# Patient Record
Sex: MALE | Race: WHITE | NOT HISPANIC OR LATINO | Employment: FULL TIME | URBAN - METROPOLITAN AREA
[De-identification: names, ages, dates, MRNs, and addresses within clinical notes are randomized per-mention and may not be internally consistent; named-entity substitution may affect disease eponyms.]

---

## 2023-06-26 DIAGNOSIS — K21.9 GASTROESOPHAGEAL REFLUX DISEASE WITHOUT ESOPHAGITIS: ICD-10-CM

## 2023-06-26 DIAGNOSIS — E03.9 HYPOTHYROIDISM, UNSPECIFIED TYPE: Primary | ICD-10-CM

## 2023-06-26 RX ORDER — TIMOLOL MALEATE 5 MG/ML
1 SOLUTION/ DROPS OPHTHALMIC
COMMUNITY
Start: 2023-05-23 | End: 2023-10-17 | Stop reason: SDUPTHER

## 2023-06-26 RX ORDER — ALPRAZOLAM 0.5 MG/1
1 TABLET ORAL DAILY
COMMUNITY
End: 2024-01-16 | Stop reason: SDUPTHER

## 2023-06-26 RX ORDER — QUETIAPINE FUMARATE 25 MG/1
25 TABLET, FILM COATED ORAL
COMMUNITY
Start: 2023-05-30 | End: 2024-01-16 | Stop reason: SDUPTHER

## 2023-06-26 RX ORDER — OMEPRAZOLE 20 MG/1
20 CAPSULE, DELAYED RELEASE ORAL DAILY
COMMUNITY
Start: 2023-05-23 | End: 2023-06-26 | Stop reason: SDUPTHER

## 2023-06-26 RX ORDER — LATANOPROST 50 UG/ML
1 SOLUTION/ DROPS OPHTHALMIC NIGHTLY
COMMUNITY
Start: 2023-05-31 | End: 2023-10-17 | Stop reason: SDUPTHER

## 2023-06-26 RX ORDER — OMEPRAZOLE 20 MG/1
20 CAPSULE, DELAYED RELEASE ORAL DAILY
Qty: 30 CAPSULE | Refills: 0 | Status: SHIPPED | OUTPATIENT
Start: 2023-06-26 | End: 2023-08-10 | Stop reason: SDUPTHER

## 2023-06-26 RX ORDER — LEVOTHYROXINE SODIUM 175 UG/1
TABLET ORAL
Qty: 30 TABLET | Refills: 0 | Status: SHIPPED | OUTPATIENT
Start: 2023-06-26 | End: 2023-10-02

## 2023-06-26 RX ORDER — LEVOTHYROXINE SODIUM 175 UG/1
TABLET ORAL
COMMUNITY
Start: 2023-06-08 | End: 2023-06-26 | Stop reason: SDUPTHER

## 2023-06-26 NOTE — TELEPHONE ENCOUNTER
Rx Refill Request Telephone Encounter    Name:  Sp Morrison  :  [id number]  Medication Name: Omeprazole 20 mg, Synthroid 175mg  Specific Pharmacy location:    [facility] [id number] [facility] [address] [facility]  [address] [facility] [address]  Phone: [phone] Fax: [phone]  Date of last appointment:  3/3/2023  Date of next appointment:  N/A  Best number to reach patient:  [phone]

## 2023-08-09 DIAGNOSIS — K21.9 GASTROESOPHAGEAL REFLUX DISEASE WITHOUT ESOPHAGITIS: ICD-10-CM

## 2023-08-10 ENCOUNTER — OFFICE VISIT (OUTPATIENT)
Dept: PRIMARY CARE | Facility: CLINIC | Age: 58
End: 2023-08-10
Payer: COMMERCIAL

## 2023-08-10 VITALS
TEMPERATURE: 98.3 F | BODY MASS INDEX: 33.88 KG/M2 | OXYGEN SATURATION: 96 % | WEIGHT: 242 LBS | RESPIRATION RATE: 18 BRPM | HEART RATE: 90 BPM | DIASTOLIC BLOOD PRESSURE: 92 MMHG | SYSTOLIC BLOOD PRESSURE: 148 MMHG | HEIGHT: 71 IN

## 2023-08-10 DIAGNOSIS — K21.9 GASTROESOPHAGEAL REFLUX DISEASE WITHOUT ESOPHAGITIS: ICD-10-CM

## 2023-08-10 DIAGNOSIS — E03.9 HYPOTHYROIDISM, UNSPECIFIED TYPE: ICD-10-CM

## 2023-08-10 DIAGNOSIS — Z00.00 PHYSICAL EXAM: Primary | ICD-10-CM

## 2023-08-10 PROCEDURE — 99396 PREV VISIT EST AGE 40-64: CPT | Performed by: NURSE PRACTITIONER

## 2023-08-10 RX ORDER — OMEPRAZOLE 20 MG/1
20 CAPSULE, DELAYED RELEASE ORAL DAILY
Qty: 90 CAPSULE | Refills: 3 | Status: SHIPPED | OUTPATIENT
Start: 2023-08-10 | End: 2024-05-23 | Stop reason: SDUPTHER

## 2023-08-10 ASSESSMENT — ENCOUNTER SYMPTOMS
WHEEZING: 0
PALPITATIONS: 0
COUGH: 0

## 2023-08-10 ASSESSMENT — PATIENT HEALTH QUESTIONNAIRE - PHQ9
SUM OF ALL RESPONSES TO PHQ9 QUESTIONS 1 AND 2: 0
2. FEELING DOWN, DEPRESSED OR HOPELESS: NOT AT ALL
1. LITTLE INTEREST OR PLEASURE IN DOING THINGS: NOT AT ALL

## 2023-08-10 ASSESSMENT — PAIN SCALES - GENERAL: PAINLEVEL: 0-NO PAIN

## 2023-08-10 NOTE — PATIENT INSTRUCTIONS
Continue medications as prescribed.  Healthy diet and drink plenty of water.  Please have labs drawn-You will be notified with abnormal results only.    SEE MY CHART FOR RESULTS- If you have any questions please do not hesitate to notify our office.    Please schedule all necessary health screenings ophthalmology and dentist.    Follow-up in 6 MONTHS.  Call office any new concerns.

## 2023-08-10 NOTE — TELEPHONE ENCOUNTER
Rx Refill Request Telephone Encounter    Name:  Sp Morrison  :  [id number]  Medication Name:  omeprazole 20 mg DR capsule  Specific Pharmacy location:    [facility] [id number] [facility] [address] [facility]  [address] [facility] [address]  Phone: [phone] Fax: [phone]  Date of last appointment:  3/3/2023  Date of next appointment:  8/10/2023  Best number to reach patient:  [phone]

## 2023-08-10 NOTE — PROGRESS NOTES
"Subjective   Patient ID: Sp Morrison is a 58 y.o. male who presents for Med Refill (Blood work for thyroid. ).    Med Refill  Pertinent negatives include no chest pain or coughing.      Patient presents to clinic for annual visit.    Past medical history of anxiety being followed by psychiatry.  Recently seenx 1 month ago.    Has been doing well no specific complaints or concerns today.    Appetite normal bowel and bladder normal.    Health screenings:  Colonoscopy 10/7/2021    Current everyday smoker      Review of Systems   Respiratory:  Negative for cough and wheezing.    Cardiovascular:  Negative for chest pain and palpitations.   All other systems reviewed and are negative.      Objective   BP (!) 148/92 (BP Location: Left arm, Patient Position: Sitting, BP Cuff Size: Large adult)   Pulse 90   Temp 36.8 °C (98.3 °F) (Temporal)   Resp 18   Ht 1.803 m (5' 11\")   Wt 110 kg (242 lb)   SpO2 96%   BMI 33.75 kg/m²     Physical Exam  Vitals reviewed.   Constitutional:       Appearance: Normal appearance.   HENT:      Right Ear: Tympanic membrane and external ear normal.      Left Ear: Tympanic membrane and external ear normal.      Mouth/Throat:      Mouth: Mucous membranes are moist.      Pharynx: Oropharynx is clear.   Eyes:      Pupils: Pupils are equal, round, and reactive to light.   Neck:      Thyroid: No thyroid mass, thyromegaly or thyroid tenderness.   Cardiovascular:      Rate and Rhythm: Normal rate and regular rhythm.   Pulmonary:      Effort: Pulmonary effort is normal.      Breath sounds: Normal breath sounds.   Abdominal:      General: Bowel sounds are normal.      Palpations: Abdomen is soft.   Musculoskeletal:         General: Normal range of motion.      Cervical back: Normal range of motion and neck supple.   Skin:     General: Skin is warm and dry.   Neurological:      Mental Status: He is alert and oriented to person, place, and time.   Psychiatric:         Mood and Affect: Mood " normal.         Assessment/Plan   Problem List Items Addressed This Visit    None  Visit Diagnoses       Physical exam    -  Primary    Relevant Medications    omeprazole (PriLOSEC) 20 mg DR capsule    Other Relevant Orders    CBC    Comprehensive Metabolic Panel    Lipid Panel    Hemoglobin A1C    Prostate Specific Antigen, Screen    Tsh With Reflex To Free T4 If Abnormal    Vitamin D 25-Hydroxy,Total    Gastroesophageal reflux disease without esophagitis        Relevant Medications    omeprazole (PriLOSEC) 20 mg DR capsule    Hypothyroidism, unspecified type       Relevant Orders   Tsh With Reflex To Free T4 If Abnormal

## 2023-08-11 RX ORDER — OMEPRAZOLE 20 MG/1
20 CAPSULE, DELAYED RELEASE ORAL DAILY
Qty: 30 CAPSULE | Refills: 0 | OUTPATIENT
Start: 2023-08-11

## 2023-08-15 ENCOUNTER — LAB (OUTPATIENT)
Dept: LAB | Facility: LAB | Age: 58
End: 2023-08-15
Payer: COMMERCIAL

## 2023-08-15 DIAGNOSIS — Z00.00 PHYSICAL EXAM: ICD-10-CM

## 2023-08-15 DIAGNOSIS — E03.9 HYPOTHYROIDISM, UNSPECIFIED TYPE: ICD-10-CM

## 2023-08-15 LAB
ALANINE AMINOTRANSFERASE (SGPT) (U/L) IN SER/PLAS: 15 U/L (ref 10–52)
ALBUMIN (G/DL) IN SER/PLAS: 4 G/DL (ref 3.4–5)
ALKALINE PHOSPHATASE (U/L) IN SER/PLAS: 55 U/L (ref 33–120)
ANION GAP IN SER/PLAS: 13 MMOL/L (ref 10–20)
ASPARTATE AMINOTRANSFERASE (SGOT) (U/L) IN SER/PLAS: 16 U/L (ref 9–39)
BILIRUBIN TOTAL (MG/DL) IN SER/PLAS: 0.5 MG/DL (ref 0–1.2)
CALCIDIOL (25 OH VITAMIN D3) (NG/ML) IN SER/PLAS: 31 NG/ML
CALCIUM (MG/DL) IN SER/PLAS: 10.9 MG/DL (ref 8.6–10.3)
CARBON DIOXIDE, TOTAL (MMOL/L) IN SER/PLAS: 27 MMOL/L (ref 21–32)
CHLORIDE (MMOL/L) IN SER/PLAS: 107 MMOL/L (ref 98–107)
CHOLESTEROL (MG/DL) IN SER/PLAS: 179 MG/DL (ref 0–199)
CHOLESTEROL IN HDL (MG/DL) IN SER/PLAS: 46.7 MG/DL
CHOLESTEROL/HDL RATIO: 3.8
CREATININE (MG/DL) IN SER/PLAS: 0.9 MG/DL (ref 0.5–1.3)
ERYTHROCYTE DISTRIBUTION WIDTH (RATIO) BY AUTOMATED COUNT: 13.4 % (ref 11.5–14.5)
ERYTHROCYTE MEAN CORPUSCULAR HEMOGLOBIN CONCENTRATION (G/DL) BY AUTOMATED: 33 G/DL (ref 32–36)
ERYTHROCYTE MEAN CORPUSCULAR VOLUME (FL) BY AUTOMATED COUNT: 96 FL (ref 80–100)
ERYTHROCYTES (10*6/UL) IN BLOOD BY AUTOMATED COUNT: 4.55 X10E12/L (ref 4.5–5.9)
ESTIMATED AVERAGE GLUCOSE FOR HBA1C: 108 MG/DL
GFR MALE: >90 ML/MIN/1.73M2
GLUCOSE (MG/DL) IN SER/PLAS: 98 MG/DL (ref 74–99)
HEMATOCRIT (%) IN BLOOD BY AUTOMATED COUNT: 43.6 % (ref 41–52)
HEMOGLOBIN (G/DL) IN BLOOD: 14.4 G/DL (ref 13.5–17.5)
HEMOGLOBIN A1C/HEMOGLOBIN TOTAL IN BLOOD: 5.4 %
LDL: 75 MG/DL (ref 0–99)
LEUKOCYTES (10*3/UL) IN BLOOD BY AUTOMATED COUNT: 4.6 X10E9/L (ref 4.4–11.3)
NON HDL CHOLESTEROL: 132 MG/DL
PLATELETS (10*3/UL) IN BLOOD AUTOMATED COUNT: 254 X10E9/L (ref 150–450)
POTASSIUM (MMOL/L) IN SER/PLAS: 4.4 MMOL/L (ref 3.5–5.3)
PROSTATE SPECIFIC ANTIGEN,SCREEN: 0.66 NG/ML (ref 0–4)
PROTEIN TOTAL: 6.5 G/DL (ref 6.4–8.2)
SODIUM (MMOL/L) IN SER/PLAS: 143 MMOL/L (ref 136–145)
THYROTROPIN (MIU/L) IN SER/PLAS BY DETECTION LIMIT <= 0.05 MIU/L: 0.16 MIU/L (ref 0.44–3.98)
THYROXINE (T4) FREE (NG/DL) IN SER/PLAS: 0.74 NG/DL (ref 0.61–1.12)
TRIGLYCERIDE (MG/DL) IN SER/PLAS: 287 MG/DL (ref 0–149)
UREA NITROGEN (MG/DL) IN SER/PLAS: 14 MG/DL (ref 6–23)
VLDL: 57 MG/DL (ref 0–40)

## 2023-08-15 PROCEDURE — 84153 ASSAY OF PSA TOTAL: CPT

## 2023-08-15 PROCEDURE — 80053 COMPREHEN METABOLIC PANEL: CPT

## 2023-08-15 PROCEDURE — 80061 LIPID PANEL: CPT

## 2023-08-15 PROCEDURE — 84439 ASSAY OF FREE THYROXINE: CPT

## 2023-08-15 PROCEDURE — 85027 COMPLETE CBC AUTOMATED: CPT

## 2023-08-15 PROCEDURE — 84443 ASSAY THYROID STIM HORMONE: CPT

## 2023-08-15 PROCEDURE — 36415 COLL VENOUS BLD VENIPUNCTURE: CPT

## 2023-08-15 PROCEDURE — 83036 HEMOGLOBIN GLYCOSYLATED A1C: CPT

## 2023-08-15 PROCEDURE — 82306 VITAMIN D 25 HYDROXY: CPT

## 2023-08-21 DIAGNOSIS — E03.9 HYPOTHYROIDISM, UNSPECIFIED TYPE: Primary | ICD-10-CM

## 2023-08-22 NOTE — TELEPHONE ENCOUNTER
Patient returned phone call. Called and notified patient of lab results and CNP recommendations. Patient verbalized understanding.

## 2023-08-22 NOTE — TELEPHONE ENCOUNTER
Labs  Received: Yesterday  Reta Boston, APRN-CNP  Viki Ryan LPN  Please notify patient that TSH level is slightly below the normal range.  T4 is normal.  Advise patient to take levothyroxine at the same time every day weight at least 30 minutes before eating or drinking or taking any other medications.  We will recheck thyroid level in 6 weeks-Order has been placed.  All of the labs within normal range.

## 2023-08-25 ENCOUNTER — TELEPHONE (OUTPATIENT)
Dept: PRIMARY CARE | Facility: CLINIC | Age: 58
End: 2023-08-25
Payer: COMMERCIAL

## 2023-08-25 NOTE — TELEPHONE ENCOUNTER
Patient stopped by and wanted to know if you were going to change his levothyroxine 175mcg tablets based off his lab results he just had done. Because he is requesting a refill on them and wasn't sure if you were changing the medication or not

## 2023-10-01 DIAGNOSIS — E03.9 HYPOTHYROIDISM, UNSPECIFIED TYPE: ICD-10-CM

## 2023-10-02 RX ORDER — LEVOTHYROXINE SODIUM 175 UG/1
TABLET ORAL
Qty: 30 TABLET | Refills: 5 | Status: SHIPPED | OUTPATIENT
Start: 2023-10-02 | End: 2023-11-21 | Stop reason: DRUGHIGH

## 2023-10-04 RX ORDER — TIMOLOL MALEATE 5 MG/ML
SOLUTION/ DROPS OPHTHALMIC
Qty: 15 ML | Refills: 0 | OUTPATIENT
Start: 2023-10-04

## 2023-10-04 RX ORDER — LATANOPROST 50 UG/ML
SOLUTION/ DROPS OPHTHALMIC
Qty: 7.5 ML | Refills: 0 | OUTPATIENT
Start: 2023-10-04

## 2023-10-16 ENCOUNTER — APPOINTMENT (OUTPATIENT)
Dept: OPHTHALMOLOGY | Facility: CLINIC | Age: 58
End: 2023-10-16
Payer: COMMERCIAL

## 2023-10-17 DIAGNOSIS — H40.213 ACUTE PRIMARY ANGLE-CLOSURE GLAUCOMA OF BOTH EYES: Primary | ICD-10-CM

## 2023-10-17 RX ORDER — LATANOPROST 50 UG/ML
1 SOLUTION/ DROPS OPHTHALMIC NIGHTLY
Qty: 7.5 ML | Refills: 3 | Status: SHIPPED | OUTPATIENT
Start: 2023-10-17 | End: 2023-12-01 | Stop reason: SDUPTHER

## 2023-10-17 RX ORDER — TIMOLOL MALEATE 5 MG/ML
1 SOLUTION/ DROPS OPHTHALMIC 2 TIMES DAILY
Qty: 10 ML | Refills: 2 | Status: SHIPPED | OUTPATIENT
Start: 2023-10-17 | End: 2023-12-01 | Stop reason: SDUPTHER

## 2023-11-17 ENCOUNTER — OFFICE VISIT (OUTPATIENT)
Dept: PRIMARY CARE | Facility: CLINIC | Age: 58
End: 2023-11-17
Payer: COMMERCIAL

## 2023-11-17 ENCOUNTER — APPOINTMENT (OUTPATIENT)
Dept: LAB | Facility: LAB | Age: 58
End: 2023-11-17
Payer: COMMERCIAL

## 2023-11-17 ENCOUNTER — LAB (OUTPATIENT)
Dept: LAB | Facility: LAB | Age: 58
End: 2023-11-17
Payer: COMMERCIAL

## 2023-11-17 VITALS
HEART RATE: 92 BPM | BODY MASS INDEX: 35.2 KG/M2 | DIASTOLIC BLOOD PRESSURE: 78 MMHG | RESPIRATION RATE: 17 BRPM | SYSTOLIC BLOOD PRESSURE: 118 MMHG | OXYGEN SATURATION: 97 % | TEMPERATURE: 98.2 F | WEIGHT: 252.4 LBS

## 2023-11-17 DIAGNOSIS — A60.00 GENITAL HERPES SIMPLEX, UNSPECIFIED SITE: ICD-10-CM

## 2023-11-17 DIAGNOSIS — E03.9 HYPOTHYROIDISM, UNSPECIFIED TYPE: ICD-10-CM

## 2023-11-17 DIAGNOSIS — M19.079 INFLAMMATION OF FOOT JOINT: Primary | ICD-10-CM

## 2023-11-17 DIAGNOSIS — M19.079 INFLAMMATION OF FOOT JOINT: ICD-10-CM

## 2023-11-17 PROCEDURE — 84443 ASSAY THYROID STIM HORMONE: CPT

## 2023-11-17 PROCEDURE — 84550 ASSAY OF BLOOD/URIC ACID: CPT

## 2023-11-17 PROCEDURE — 84439 ASSAY OF FREE THYROXINE: CPT

## 2023-11-17 PROCEDURE — 99214 OFFICE O/P EST MOD 30 MIN: CPT | Performed by: NURSE PRACTITIONER

## 2023-11-17 PROCEDURE — 36415 COLL VENOUS BLD VENIPUNCTURE: CPT

## 2023-11-17 RX ORDER — ACYCLOVIR 400 MG/1
TABLET ORAL
Qty: 60 TABLET | Refills: 3 | Status: SHIPPED | OUTPATIENT
Start: 2023-11-17

## 2023-11-17 ASSESSMENT — PAIN SCALES - GENERAL: PAINLEVEL: 5

## 2023-11-17 ASSESSMENT — ENCOUNTER SYMPTOMS
FEVER: 0
JOINT SWELLING: 0
CHILLS: 0

## 2023-11-17 NOTE — PATIENT INSTRUCTIONS
Examined of your right foot does not show any current signs of gout.  Advise Tylenol or ibuprofen as needed.  Elevate foot.  Please have labs drawn.  Please take medications daily as prescribed.  Follow-up in this clinic with Dr. Hall 6 months sooner if needed.

## 2023-11-17 NOTE — PROGRESS NOTES
Subjective   Patient ID: Sp Morrison is a 58 y.o. male who presents for Foot Swelling (Believes it may be gout. Feels like he is stepping on a pile of something when he walks. Reports symptoms for a couple months- but becoming worse. ) and Med Refill (Asking for refill on acyclovir- do not see on the med list- so unable to pend. ).    Med Refill  Pertinent negatives include no chills, fever or joint swelling.      Patient presents to clinic for follow-up visit.  Patient with complaint of swelling on bottom of the right foot.  He denies trauma or injury to the foot.  He states in walking feels like he is walking on water.  He denies redness swelling heat to the bottom of the foot.    TSH level abnormal at 0.16 x3 months ago.  Patient admits to not always being compliant with his levothyroxine states sometimes he forgets to take the medication.  We will recheck level.    Patient requesting refill on acyclovir.  Patient with history of genital herpes states he does not have a current outbreak but would like to have medication available when he needs it.     Appetite normal bowel and bladder normal.    Review of Systems   Constitutional:  Negative for chills and fever.   Musculoskeletal:  Negative for joint swelling.        See HPI   All other systems reviewed and are negative.      Objective   /78 (BP Location: Left arm, Patient Position: Sitting, BP Cuff Size: Large adult)   Pulse (!) 47   Temp 36.8 °C (98.2 °F) (Temporal)   Resp 17   Wt 114 kg (252 lb 6.4 oz)   SpO2 97%   BMI 35.20 kg/m²     Physical Exam  Vitals reviewed.   Constitutional:       Appearance: Normal appearance. He is not ill-appearing or toxic-appearing.   Cardiovascular:      Rate and Rhythm: Normal rate and regular rhythm.   Pulmonary:      Effort: Pulmonary effort is normal.      Breath sounds: Normal breath sounds.   Musculoskeletal:         General: No swelling.      Comments: Right foot without erythema swelling not  significantly warm to touch.  Neurovascular intact.  Good range of motion.   Skin:     General: Skin is warm and dry.   Neurological:      Mental Status: He is alert and oriented to person, place, and time.         Assessment/Plan   Problem List Items Addressed This Visit    None  Visit Diagnoses       Inflammation of foot joint    -  Primary    Relevant Orders    Uric acid  Tylenol ibuprofen as needed.    Hypothyroidism, unspecified type        Relevant Orders    Tsh With Reflex To Free T4 If Abnormal  Levothyroxine 175 mcg daily.    Genital herpes simplex, unspecified site        Relevant Medications    acyclovir (Zovirax) 400 mg tablet

## 2023-11-18 LAB
T4 FREE SERPL-MCNC: 1.5 NG/DL (ref 0.78–1.48)
TSH SERPL-ACNC: 0.03 MIU/L (ref 0.44–3.98)
URATE SERPL-MCNC: 8.9 MG/DL (ref 4–7.5)

## 2023-11-21 DIAGNOSIS — M10.9 ACUTE GOUT, UNSPECIFIED CAUSE, UNSPECIFIED SITE: Primary | ICD-10-CM

## 2023-11-21 DIAGNOSIS — E03.9 HYPOTHYROIDISM, UNSPECIFIED TYPE: Primary | ICD-10-CM

## 2023-11-21 RX ORDER — LEVOTHYROXINE SODIUM 150 UG/1
150 TABLET ORAL DAILY
Qty: 30 TABLET | Refills: 5 | Status: SHIPPED | OUTPATIENT
Start: 2023-11-21 | End: 2024-05-15

## 2023-11-21 RX ORDER — COLCHICINE 0.6 MG/1
TABLET ORAL
Qty: 12 TABLET | Refills: 1 | Status: SHIPPED | OUTPATIENT
Start: 2023-11-21

## 2023-11-21 NOTE — PROGRESS NOTES
Phoned patient to discuss lab results.TSH level 0.03 -11/17/2023 and 0.16 on 8/15/2023.  Free thyroxine 1.50.  Patient is currently on levothyroxine 175 mcg-will lower dose to 150 mcg.  He admits to me that he is not compliant daily with the medication and takes it maybe 5 days a week.  Patient has been seen by endocrinology Donna Steward last seen 8/5/2022.  I have advised patient to schedule a follow-up with endocrinology for further management of his thyroid disease.  Patient states understanding and will schedule.  Uric acid elevated at 8.9.  We will start Colchicine.

## 2023-12-01 ENCOUNTER — OFFICE VISIT (OUTPATIENT)
Dept: OPHTHALMOLOGY | Facility: CLINIC | Age: 58
End: 2023-12-01
Payer: COMMERCIAL

## 2023-12-01 DIAGNOSIS — H40.213 ACUTE PRIMARY ANGLE-CLOSURE GLAUCOMA OF BOTH EYES: ICD-10-CM

## 2023-12-01 DIAGNOSIS — H40.1331 PIGMENTARY GLAUCOMA OF BOTH EYES, MILD STAGE: Primary | ICD-10-CM

## 2023-12-01 DIAGNOSIS — Z96.1 PSEUDOPHAKIA OF BOTH EYES: ICD-10-CM

## 2023-12-01 PROCEDURE — 92133 CPTRZD OPH DX IMG PST SGM ON: CPT | Performed by: OPHTHALMOLOGY

## 2023-12-01 PROCEDURE — 99204 OFFICE O/P NEW MOD 45 MIN: CPT | Performed by: OPHTHALMOLOGY

## 2023-12-01 PROCEDURE — 92083 EXTENDED VISUAL FIELD XM: CPT | Performed by: OPHTHALMOLOGY

## 2023-12-01 RX ORDER — LATANOPROST 50 UG/ML
1 SOLUTION/ DROPS OPHTHALMIC NIGHTLY
Qty: 2.5 ML | Refills: 11 | Status: SHIPPED | OUTPATIENT
Start: 2023-12-01 | End: 2024-06-07 | Stop reason: SDUPTHER

## 2023-12-01 RX ORDER — TIMOLOL MALEATE 5 MG/ML
1 SOLUTION/ DROPS OPHTHALMIC 2 TIMES DAILY
Qty: 10 ML | Refills: 11 | Status: SHIPPED | OUTPATIENT
Start: 2023-12-01 | End: 2024-02-02 | Stop reason: ALTCHOICE

## 2023-12-01 ASSESSMENT — REFRACTION_WEARINGRX
OD_SPHERE: -0.50
OS_ADD: +2.25
OD_ADD: +2.25
OS_CYLINDER: -3.50
OS_AXIS: 090
OD_AXIS: 085
OD_CYLINDER: -3.50
OS_SPHERE: PLANO

## 2023-12-01 ASSESSMENT — VISUAL ACUITY
OD_CC: 20/20-1
OS_CC: 20/25-1
CORRECTION_TYPE: GLASSES
METHOD: SNELLEN - LINEAR

## 2023-12-01 ASSESSMENT — CONF VISUAL FIELD
OD_INFERIOR_NASAL_RESTRICTION: 0
OD_SUPERIOR_NASAL_RESTRICTION: 0
OD_INFERIOR_TEMPORAL_RESTRICTION: 0
OS_SUPERIOR_TEMPORAL_RESTRICTION: 0
OS_NORMAL: 1
OD_SUPERIOR_TEMPORAL_RESTRICTION: 0
OS_SUPERIOR_NASAL_RESTRICTION: 0
METHOD: COUNTING FINGERS
OS_INFERIOR_NASAL_RESTRICTION: 0
OD_NORMAL: 1
OS_INFERIOR_TEMPORAL_RESTRICTION: 0

## 2023-12-01 ASSESSMENT — TONOMETRY
OS_IOP_MMHG: 26
OD_IOP_MMHG: 28
IOP_METHOD: GOLDMANN APPLANATION

## 2023-12-01 ASSESSMENT — ENCOUNTER SYMPTOMS: EYES NEGATIVE: 1

## 2023-12-01 ASSESSMENT — EXTERNAL EXAM - LEFT EYE: OS_EXAM: NORMAL

## 2023-12-01 ASSESSMENT — EXTERNAL EXAM - RIGHT EYE: OD_EXAM: NORMAL

## 2023-12-01 ASSESSMENT — CUP TO DISC RATIO
OD_RATIO: 0.8
OS_RATIO: 0.8

## 2023-12-01 ASSESSMENT — SLIT LAMP EXAM - LIDS
COMMENTS: NORMAL
COMMENTS: NORMAL

## 2023-12-01 NOTE — PROGRESS NOTES
Visual Acuity (Snellen - Linear)         Right Left    Dist cc 20/20-1 20/25-1      Correction: Glasses          Tonometry       Tonometry (Goldmann Applanation, 11:15 AM)         Right Left    Pressure 28 26                  Assessment/Plan   Last dilated:  12/1/23    1.  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 Pt has been off medications for 3-4 months.  There is evidence of progression.  Pt with h/o stinging with cosopt.  Combigan is expensive.  Pt states that the loss to follow up was financial due to needing knee surgery. s/p PE c PCIOl + trabectome OD (10/18/16):  pre-op VA 20/40 IOP 14 mmHg.  s/p PE c PCIOL + trabectome OS (12/14/16):  pre-op VA 20/20, IOP 18 mmHg.  Pt's IOP are elevated and HVF and RNFLs have progressed likely due to the period of his elevated IOP.  Pt ran out of drops ~2 months ago.       Plan:  re-start latanoprost OU QHS                 Re-start timolol OU Qam                  f/u 1 month    2.  Pseudophakia (PCIOL) OU:  pt with PCO OS, but VA is still good     Plan:  monitor    3.  Blepharitis OU:  pt very symptomatic     Plan:  cont artificial tears OU QID              cont lid hygene OU BID

## 2024-01-16 ENCOUNTER — TELEMEDICINE (OUTPATIENT)
Dept: BEHAVIORAL HEALTH | Facility: CLINIC | Age: 59
End: 2024-01-16
Payer: COMMERCIAL

## 2024-01-16 DIAGNOSIS — G47.00 INSOMNIA, UNSPECIFIED TYPE: ICD-10-CM

## 2024-01-16 DIAGNOSIS — F41.9 ANXIETY: ICD-10-CM

## 2024-01-16 PROCEDURE — 99214 OFFICE O/P EST MOD 30 MIN: CPT | Performed by: PSYCHIATRY & NEUROLOGY

## 2024-01-16 RX ORDER — QUETIAPINE FUMARATE 25 MG/1
TABLET, FILM COATED ORAL
Qty: 120 TABLET | Refills: 5 | Status: SHIPPED | OUTPATIENT
Start: 2024-01-16 | End: 2024-04-21 | Stop reason: SDUPTHER

## 2024-01-16 RX ORDER — ALPRAZOLAM 0.5 MG/1
0.5 TABLET ORAL DAILY
Qty: 30 TABLET | Refills: 2 | Status: SHIPPED | OUTPATIENT
Start: 2024-01-16 | End: 2024-04-09 | Stop reason: SDUPTHER

## 2024-01-16 NOTE — PROGRESS NOTES
Subjective   Patient ID: Sp Morrison is a 58 y.o. male.    HPI  Pt is treated for insomnia and depression with anxiety  Pt denies exacerbation of sx    Review of Systems   Psychiatric/Behavioral:  Negative for decreased concentration, dysphoric mood, sleep disturbance and suicidal ideas. The patient is not nervous/anxious.          Objective   Physical Exam  Psychiatric:         Mood and Affect: Mood normal.         Behavior: Behavior normal.         Thought Content: Thought content normal.         Judgment: Judgment normal.           Assessment/Plan   Diagnoses and all orders for this visit:  Anxiety  Insomnia, unspecified type  -     QUEtiapine (SEROquel) 25 mg tablet; TAKE 3 TO 4 TABLETS BY MOUTH AT BEDTIME AS NEEDED.  -     ALPRAZolam (Xanax) 0.5 mg tablet; Take 1 tablet (0.5 mg) by mouth once daily.    ASSESSMENT AND PLAN  Keep next scheduled follow up visit;  ---after business hours and on weekends: call [phone] to reach the answering service  ---for appointments and medication refills and any questions or concerns call [phone]  ---if you run out of your medication or need more refills between visits, call our office: [phone]  ---if you need immediate assistance or in case of emergency, dial 911 or go to the nearest emergency room   ---discussed Risks, benefits, side effects and alternative treatments today and came up with a treatment plan       that pt agreed upon today.

## 2024-01-26 ASSESSMENT — ENCOUNTER SYMPTOMS
SLEEP DISTURBANCE: 0
DYSPHORIC MOOD: 0
DECREASED CONCENTRATION: 0
NERVOUS/ANXIOUS: 0

## 2024-02-02 ENCOUNTER — OFFICE VISIT (OUTPATIENT)
Dept: OPHTHALMOLOGY | Facility: CLINIC | Age: 59
End: 2024-02-02
Payer: COMMERCIAL

## 2024-02-02 DIAGNOSIS — H40.1331 PIGMENTARY GLAUCOMA OF BOTH EYES, MILD STAGE: Primary | ICD-10-CM

## 2024-02-02 DIAGNOSIS — H26.492 PCO (POSTERIOR CAPSULAR OPACIFICATION), LEFT: ICD-10-CM

## 2024-02-02 DIAGNOSIS — Z96.1 PSEUDOPHAKIA OF BOTH EYES: ICD-10-CM

## 2024-02-02 PROCEDURE — 99214 OFFICE O/P EST MOD 30 MIN: CPT | Performed by: OPHTHALMOLOGY

## 2024-02-02 RX ORDER — DORZOLAMIDE HYDROCHLORIDE AND TIMOLOL MALEATE 20; 5 MG/ML; MG/ML
1 SOLUTION/ DROPS OPHTHALMIC 2 TIMES DAILY
Qty: 10 ML | Refills: 11 | Status: SHIPPED | OUTPATIENT
Start: 2024-02-02 | End: 2024-06-07 | Stop reason: SDUPTHER

## 2024-02-02 ASSESSMENT — TONOMETRY
IOP_METHOD: GOLDMANN APPLANATION
OS_IOP_MMHG: 22
OD_IOP_MMHG: 16

## 2024-02-02 ASSESSMENT — SLIT LAMP EXAM - LIDS
COMMENTS: NORMAL
COMMENTS: NORMAL

## 2024-02-02 ASSESSMENT — VISUAL ACUITY
OD_CC: 20/20
METHOD: SNELLEN - LINEAR
CORRECTION_TYPE: GLASSES
OS_CC: 20/25

## 2024-02-02 ASSESSMENT — CONF VISUAL FIELD
OS_NORMAL: 1
OD_INFERIOR_NASAL_RESTRICTION: 0
OD_SUPERIOR_NASAL_RESTRICTION: 0
OD_INFERIOR_TEMPORAL_RESTRICTION: 0
OD_NORMAL: 1
OS_INFERIOR_NASAL_RESTRICTION: 0
OS_SUPERIOR_TEMPORAL_RESTRICTION: 0
OS_SUPERIOR_NASAL_RESTRICTION: 0
OD_SUPERIOR_TEMPORAL_RESTRICTION: 0
OS_INFERIOR_TEMPORAL_RESTRICTION: 0

## 2024-02-02 ASSESSMENT — EXTERNAL EXAM - RIGHT EYE: OD_EXAM: NORMAL

## 2024-02-02 ASSESSMENT — CUP TO DISC RATIO
OS_RATIO: 0.8
OD_RATIO: 0.8

## 2024-02-02 ASSESSMENT — EXTERNAL EXAM - LEFT EYE: OS_EXAM: NORMAL

## 2024-02-02 NOTE — PROGRESS NOTES
"Visual Acuity (Snellen - Linear)         Right Left    Dist cc 20/20 20/25      Correction: Glasses          Tonometry       Tonometry (Goldmann Applanation, 10:16 AM)         Right Left    Pressure 16 22                  Assessment/Plan   Last dilated:  12/1/23    1.  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 Pt has been off medications for 3-4 months.  There is evidence of progression.  Pt with h/o stinging with cosopt.  Combigan is expensive.  Pt states that the loss to follow up was financial due to needing knee surgery. s/p PE c PCIOl + trabectome OD (10/18/16):  pre-op VA 20/40 IOP 14 mmHg.  s/p PE c PCIOL + trabectome OS (12/14/16):  pre-op VA 20/20, IOP 18 mmHg.  Pt's IOP are elevated and HVF and RNFLs have progressed likely due to the period of his elevated IOP.  Pt ran out of drops ~2 months ago.      After re-starting, IOP is better.  OD is well controlled, but OS needs to be lower       Plan:  cont latanoprost OU QHS                 switch timolol -> dorzolamide/timolol OU Qam                  f/u 1 month    2.  Pseudophakia (PCIOL) OU:  pt with PCO OS and pt is symptomatic with driving \"looks smugey\").  Discussed options 1) CPM, 2) YAG Cap.  R/B/A reviewed hay retinal detachment     Plan:  pt wishes to proceed with YAG Cap OS (LEFT)    3.  Blepharitis OU:  pt very symptomatic     Plan:  cont artificial tears OU QID              cont lid hygene OU BID  "

## 2024-02-05 ENCOUNTER — OFFICE VISIT (OUTPATIENT)
Dept: OPHTHALMOLOGY | Facility: CLINIC | Age: 59
End: 2024-02-05
Payer: COMMERCIAL

## 2024-02-05 DIAGNOSIS — H26.492 PCO (POSTERIOR CAPSULAR OPACIFICATION), LEFT: ICD-10-CM

## 2024-02-05 PROCEDURE — 66821 AFTER CATARACT LASER SURGERY: CPT | Mod: LEFT SIDE | Performed by: OPHTHALMOLOGY

## 2024-02-05 RX ORDER — PREDNISOLONE ACETATE 10 MG/ML
1 SUSPENSION/ DROPS OPHTHALMIC 4 TIMES DAILY
Qty: 5 ML | Refills: 0 | Status: SHIPPED | OUTPATIENT
Start: 2024-02-05 | End: 2024-02-09

## 2024-02-05 ASSESSMENT — TONOMETRY
IOP_METHOD: GOLDMANN APPLANATION
OS_IOP_MMHG: 22

## 2024-02-05 ASSESSMENT — SLIT LAMP EXAM - LIDS
COMMENTS: NORMAL
COMMENTS: NORMAL

## 2024-02-05 ASSESSMENT — EXTERNAL EXAM - LEFT EYE: OS_EXAM: NORMAL

## 2024-02-05 ASSESSMENT — EXTERNAL EXAM - RIGHT EYE: OD_EXAM: NORMAL

## 2024-02-05 ASSESSMENT — CUP TO DISC RATIO
OS_RATIO: 0.8
OD_RATIO: 0.8

## 2024-02-05 ASSESSMENT — VISUAL ACUITY
OS_CC: 20/30
METHOD: SNELLEN - LINEAR

## 2024-02-05 NOTE — PROGRESS NOTES
"Visual Acuity (Snellen - Linear)         Right Left    Dist cc  20/30          Tonometry       Tonometry (Goldmann Applanation, 2:28 PM)         Right Left    Pressure  22                  Assessment/Plan   Last dilated:  12/1/23    1.  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 Pt has been off medications for 3-4 months.  There is evidence of progression.  Pt with h/o stinging with cosopt.  Combigan is expensive.  Pt states that the loss to follow up was financial due to needing knee surgery. s/p PE c PCIOl + trabectome OD (10/18/16):  pre-op VA 20/40 IOP 14 mmHg.  s/p PE c PCIOL + trabectome OS (12/14/16):  pre-op VA 20/20, IOP 18 mmHg.  Pt's IOP are elevated and HVF and RNFLs have progressed likely due to the period of his elevated IOP.  Pt ran out of drops ~2 months ago.      After re-starting, IOP is better.  OD is well controlled, but OS needs to be lower.  On IOP check today (laser day), no improvement in IOP, but will see in office during post-laser check       Plan:  cont latanoprost OU QHS                 Visit before laser switched timolol -> dorzolamide/timolol OU Qam                  f/u 1 month    2.  Pseudophakia (PCIOL) OU:  pt with PCO OS and pt is symptomatic with driving \"looks smugey\").  Discussed options 1) CPM, 2) YAG Cap.  R/B/A reviewed hay retinal detachment     Plan:  pt wishes to proceed with YAG Cap OS (LEFT) today 2/5/24    3.  Blepharitis OU:  pt very symptomatic     Plan:  cont artificial tears OU QID              cont lid hygene OU BID  "

## 2024-02-12 ENCOUNTER — APPOINTMENT (OUTPATIENT)
Dept: PRIMARY CARE | Facility: CLINIC | Age: 59
End: 2024-02-12
Payer: COMMERCIAL

## 2024-02-23 ENCOUNTER — OFFICE VISIT (OUTPATIENT)
Dept: OPHTHALMOLOGY | Facility: CLINIC | Age: 59
End: 2024-02-23
Payer: COMMERCIAL

## 2024-02-23 DIAGNOSIS — Z96.1 PSEUDOPHAKIA OF BOTH EYES: ICD-10-CM

## 2024-02-23 DIAGNOSIS — H40.1331 PIGMENTARY GLAUCOMA OF BOTH EYES, MILD STAGE: Primary | ICD-10-CM

## 2024-02-23 PROCEDURE — 99213 OFFICE O/P EST LOW 20 MIN: CPT | Performed by: OPHTHALMOLOGY

## 2024-02-23 ASSESSMENT — ENCOUNTER SYMPTOMS
HEMATOLOGIC/LYMPHATIC NEGATIVE: 0
CARDIOVASCULAR NEGATIVE: 0
PSYCHIATRIC NEGATIVE: 0
CONSTITUTIONAL NEGATIVE: 0
ENDOCRINE NEGATIVE: 0
GASTROINTESTINAL NEGATIVE: 0
ALLERGIC/IMMUNOLOGIC NEGATIVE: 0
RESPIRATORY NEGATIVE: 0
NEUROLOGICAL NEGATIVE: 0
EYES NEGATIVE: 0
MUSCULOSKELETAL NEGATIVE: 0

## 2024-02-23 ASSESSMENT — TONOMETRY
OS_IOP_MMHG: 14
IOP_METHOD: GOLDMANN APPLANATION
OD_IOP_MMHG: 16

## 2024-02-23 ASSESSMENT — VISUAL ACUITY
OD_CC: 20/20-
CORRECTION_TYPE: GLASSES
OS_CC: 20/25+
METHOD: SNELLEN - LINEAR

## 2024-02-23 ASSESSMENT — SLIT LAMP EXAM - LIDS
COMMENTS: NORMAL
COMMENTS: NORMAL

## 2024-02-23 ASSESSMENT — EXTERNAL EXAM - RIGHT EYE: OD_EXAM: NORMAL

## 2024-02-23 ASSESSMENT — EXTERNAL EXAM - LEFT EYE: OS_EXAM: NORMAL

## 2024-02-23 ASSESSMENT — CUP TO DISC RATIO
OD_RATIO: 0.8
OS_RATIO: 0.8

## 2024-02-23 NOTE — PROGRESS NOTES
"Visual Acuity (Snellen - Linear)         Right Left    Dist cc 20/20- 20/25+      Correction: Glasses          Tonometry       Tonometry (Goldmann Applanation, 8:53 AM)         Right Left    Pressure 16 14                  Assessment/Plan   Last dilated:  12/1/23    1.  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 Pt has been off medications for 3-4 months.  There is evidence of progression.  Pt with h/o stinging with cosopt.  Combigan is expensive.  Pt states that the loss to follow up was financial due to needing knee surgery. s/p PE c PCIOl + trabectome OD (10/18/16):  pre-op VA 20/40 IOP 14 mmHg.  s/p PE c PCIOL + trabectome OS (12/14/16):  pre-op VA 20/20, IOP 18 mmHg.  Pt's IOP are elevated and HVF and RNFLs have progressed likely due to the period of his elevated IOP - pt ran out of drops for ~2 month period.      IOPs are now well controlled       Plan:  cont latanoprost OU QHS                cont dorzolamide/timolol OU BID                f/u 4 month    2.  Pseudophakia (PCIOL) OU:  pt with PCO OS and pt is symptomatic with driving \"looks smugey\").  S/p YAG Cap OS (LEFT) 2/5/24      Plan:  monitor    3.  Blepharitis OU:  pt very symptomatic     Plan:  cont artificial tears OU QID              cont lid hygene OU BID  "

## 2024-03-21 ENCOUNTER — APPOINTMENT (OUTPATIENT)
Dept: PODIATRY | Facility: CLINIC | Age: 59
End: 2024-03-21
Payer: COMMERCIAL

## 2024-04-09 ENCOUNTER — TELEMEDICINE (OUTPATIENT)
Dept: BEHAVIORAL HEALTH | Facility: CLINIC | Age: 59
End: 2024-04-09
Payer: COMMERCIAL

## 2024-04-09 DIAGNOSIS — G47.00 INSOMNIA, UNSPECIFIED TYPE: ICD-10-CM

## 2024-04-09 PROCEDURE — 99214 OFFICE O/P EST MOD 30 MIN: CPT | Performed by: PSYCHIATRY & NEUROLOGY

## 2024-04-09 RX ORDER — ALPRAZOLAM 0.5 MG/1
0.5 TABLET ORAL DAILY
Qty: 30 TABLET | Refills: 2 | Status: SHIPPED | OUTPATIENT
Start: 2024-04-09

## 2024-04-09 NOTE — PROGRESS NOTES
Subjective   Patient ID: Sp Morrison is a 59 y.o. male.    HPI  The encounter diagnosis was Insomnia, unspecified type.    Current Outpatient Medications:     acyclovir (Zovirax) 400 mg tablet, Take 1 tablet twice daily as needed for suppression., Disp: 60 tablet, Rfl: 3    ALPRAZolam (Xanax) 0.5 mg tablet, Take 1 tablet (0.5 mg) by mouth once daily., Disp: 30 tablet, Rfl: 2    colchicine 0.6 mg tablet, Take 1.2 mg PO x1 then 0.6 mg PO 1 hour later x1  for 3 days. (max 1.8 mg total dose per treatment course)., Disp: 12 tablet, Rfl: 1    dorzolamide-timoloL (Cosopt) 22.3-6.8 mg/mL ophthalmic solution, Administer 1 drop into both eyes 2 times a day., Disp: 10 mL, Rfl: 11    latanoprost (Xalatan) 0.005 % ophthalmic solution, Administer 1 drop into both eyes once daily at bedtime. INSTILL ONE DROP IN EACH EYE EVERY EVENING., Disp: 2.5 mL, Rfl: 11    levothyroxine (Synthroid, Levoxyl) 150 mcg tablet, Take 1 tablet (150 mcg) by mouth once daily., Disp: 30 tablet, Rfl: 5    omeprazole (PriLOSEC) 20 mg DR capsule, Take 1 capsule (20 mg) by mouth once daily., Disp: 90 capsule, Rfl: 3    QUEtiapine (SEROquel) 25 mg tablet, TAKE 3 TO 4 TABLETS BY MOUTH AT BEDTIME AS NEEDED., Disp: 120 tablet, Rfl: 5    Lab Results   Component Value Date    AST 16 08/15/2023      Lab Results   Component Value Date    ALT 15 08/15/2023         Latest Reference Range & Units Most Recent   Hemoglobin A1C % 5.4  8/15/23 08:32   Parathyroid Hormone, Intact 18.5 - 88.0 pg/mL 60.8  5/17/21 13:59   Thyroxine, Free 0.78 - 1.48 ng/dL 1.50 (H)  11/17/23 14:08   Triiodothyronine 60 - 200 ng/dL 86  8/1/22 08:50   Thyroid Stimulating Hormone 0.44 - 3.98 mIU/L 0.03 (L)  11/17/23 14:08   Vitamin D, 25-Hydroxy, Total ng/mL 31  8/15/23 08:32   Triiodothyronine, Free 2.3 - 4.2 pg/mL 3.4  9/16/20 14:08   Testosterone, Free 35.0 - 155.0 pg/mL 51.2  2/9/18 13:25   GLUCOSE 74 - 99 mg/dL 98  8/15/23 08:32   Estimated Average Glucose MG/  8/15/23 08:32    (H): Data is abnormally high  (L): Data is abnormally low   Latest Reference Range & Units 08/15/23 08:32   GLUCOSE 74 - 99 mg/dL 98   SODIUM 136 - 145 mmol/L 143   POTASSIUM 3.5 - 5.3 mmol/L 4.4   CHLORIDE 98 - 107 mmol/L 107   Bicarbonate 21 - 32 mmol/L 27   Anion Gap 10 - 20 mmol/L 13   Blood Urea Nitrogen 6 - 23 mg/dL 14   Creatinine 0.50 - 1.30 mg/dL 0.90   Calcium 8.6 - 10.3 mg/dL 10.9 (H)   Albumin 3.4 - 5.0 g/dL 4.0   Alkaline Phosphatase 33 - 120 U/L 55   ALT 10 - 52 U/L 15   AST 9 - 39 U/L 16   Bilirubin Total 0.0 - 1.2 mg/dL 0.5   HDL CHOLESTEROL mg/dL 46.7   Cholesterol/HDL Ratio  3.8   LDL Calculated 0 - 99 mg/dL 75   VLDL 0 - 40 mg/dL 57 (H)   TRIGLYCERIDES 0 - 149 mg/dL 287 (H)   Non HDL Cholesterol mg/dL 132   Total Protein 6.4 - 8.2 g/dL 6.5   CHOLESTEROL 0 - 199 mg/dL 179   GFR MALE >90 mL/min/1.73m2 >90   (H): Data is abnormally high  Review of Systems   Psychiatric/Behavioral:  Negative for decreased concentration, dysphoric mood, sleep disturbance and suicidal ideas. The patient is not nervous/anxious.        Objective   Physical Exam  Psychiatric:         Mood and Affect: Mood and affect normal.         Behavior: Behavior normal. Behavior is cooperative.         Thought Content: Thought content is not paranoid. Thought content does not include homicidal or suicidal ideation.         Assessment/Plan   Diagnoses and all orders for this visit:  Insomnia, unspecified type  -     ALPRAZolam (Xanax) 0.5 mg tablet; Take 1 tablet (0.5 mg) by mouth once daily.  -     Follow Up In Psychiatry; Future  -     QUEtiapine (SEROquel) 25 mg tablet; TAKE 3 TO 4 TABLETS BY MOUTH AT BEDTIME AS NEEDED.

## 2024-04-21 RX ORDER — QUETIAPINE FUMARATE 25 MG/1
TABLET, FILM COATED ORAL
Qty: 120 TABLET | Refills: 5 | Status: SHIPPED | OUTPATIENT
Start: 2024-04-21

## 2024-04-21 ASSESSMENT — ENCOUNTER SYMPTOMS
DYSPHORIC MOOD: 0
SLEEP DISTURBANCE: 0
NERVOUS/ANXIOUS: 0
DECREASED CONCENTRATION: 0

## 2024-04-21 NOTE — PATIENT INSTRUCTIONS
Keep next scheduled follow up visit;  ---after business hours and on weekends: call [phone] to reach the answering service  ---for appointments and medication refills and any questions or concerns call [phone]  ---if you run out of your medication or need more refills between visits, call our office: [phone]  ---if you need immediate assistance or in case of emergency, dial 911 or go to the nearest emergency room   ---discussed Risks, benefits, side effects and alternative treatments today and came up with a treatment plan that we agreed upon today.

## 2024-05-15 DIAGNOSIS — E03.9 HYPOTHYROIDISM, UNSPECIFIED TYPE: ICD-10-CM

## 2024-05-15 RX ORDER — LEVOTHYROXINE SODIUM 150 UG/1
150 TABLET ORAL DAILY
Qty: 90 TABLET | Refills: 0 | OUTPATIENT
Start: 2024-05-15

## 2024-05-15 RX ORDER — LEVOTHYROXINE SODIUM 150 UG/1
150 TABLET ORAL DAILY
Qty: 90 TABLET | Refills: 0 | Status: SHIPPED | OUTPATIENT
Start: 2024-05-15

## 2024-05-15 NOTE — TELEPHONE ENCOUNTER
----- Message from Sp Morrison sent at 5/15/2024  9:53 AM EDT -----  Regarding: Sp Morrison  Contact: [phone]  I haven't even met you yet (next week appt)  But I need my script refilled for my thyroid ..(Lev...) Thank you Asher!

## 2024-05-23 ENCOUNTER — OFFICE VISIT (OUTPATIENT)
Dept: PRIMARY CARE | Facility: CLINIC | Age: 59
End: 2024-05-23
Payer: COMMERCIAL

## 2024-05-23 VITALS
SYSTOLIC BLOOD PRESSURE: 125 MMHG | RESPIRATION RATE: 18 BRPM | BODY MASS INDEX: 35.29 KG/M2 | TEMPERATURE: 97.5 F | HEART RATE: 74 BPM | DIASTOLIC BLOOD PRESSURE: 92 MMHG | WEIGHT: 253 LBS | OXYGEN SATURATION: 94 %

## 2024-05-23 DIAGNOSIS — E21.3 HYPERPARATHYROIDISM (MULTI): ICD-10-CM

## 2024-05-23 DIAGNOSIS — E78.1 HIGH TRIGLYCERIDES: ICD-10-CM

## 2024-05-23 DIAGNOSIS — Z00.00 PHYSICAL EXAM: Primary | ICD-10-CM

## 2024-05-23 DIAGNOSIS — I85.00 ESOPHAGEAL VARICES WITHOUT BLEEDING, UNSPECIFIED ESOPHAGEAL VARICES TYPE (MULTI): ICD-10-CM

## 2024-05-23 PROCEDURE — 99214 OFFICE O/P EST MOD 30 MIN: CPT | Performed by: FAMILY MEDICINE

## 2024-05-23 RX ORDER — OMEPRAZOLE 20 MG/1
20 CAPSULE, DELAYED RELEASE ORAL DAILY
Qty: 90 CAPSULE | Refills: 3 | Status: SHIPPED | OUTPATIENT
Start: 2024-05-23 | End: 2025-05-23

## 2024-05-23 ASSESSMENT — PATIENT HEALTH QUESTIONNAIRE - PHQ9
1. LITTLE INTEREST OR PLEASURE IN DOING THINGS: NOT AT ALL
2. FEELING DOWN, DEPRESSED OR HOPELESS: NOT AT ALL
SUM OF ALL RESPONSES TO PHQ9 QUESTIONS 1 AND 2: 0

## 2024-05-23 ASSESSMENT — PAIN SCALES - GENERAL: PAINLEVEL: 0-NO PAIN

## 2024-05-23 NOTE — PROGRESS NOTES
Subjective   Patient ID: Sp Morrison is a 59 y.o. male who presents for New Patient Visit and Establish Care.    HPI   Establish care     Review of Systems   All other systems reviewed and are negative.      Objective   BP (!) 125/92 (BP Location: Left arm, Patient Position: Sitting, BP Cuff Size: Large adult)   Pulse 74   Temp 36.4 °C (97.5 °F) (Temporal)   Resp 18   Wt 115 kg (253 lb)   SpO2 94%   BMI 35.29 kg/m²     Physical Exam  Vitals and nursing note reviewed.   Cardiovascular:      Rate and Rhythm: Normal rate and regular rhythm.   Pulmonary:      Breath sounds: Normal breath sounds. No wheezing.   Musculoskeletal:      Cervical back: Neck supple.   Lymphadenopathy:      Cervical: No cervical adenopathy.   Neurological:      Mental Status: He is alert.   Psychiatric:         Mood and Affect: Mood normal.         Behavior: Behavior normal.         Thought Content: Thought content normal.         Judgment: Judgment normal.         Assessment/Plan   Diagnoses and all orders for this visit:  Physical exam  -     CT cardiac scoring wo IV contrast; Future  Esophageal varices without bleeding, unspecified esophageal varices type (Multi)  Comments:  Prilosec. last EGD was 2018.  Orders:  -     Esophagogastroduodenoscopy (EGD); Future  Hyperparathyroidism (Multi)  Comments:  follows with Endocrinology  Orders:  -     omeprazole (PriLOSEC) 20 mg DR capsule; Take 1 capsule (20 mg) by mouth once daily.  High triglycerides  Comments:  small portion meals, moderation alcohol,  Orders:  -     CT cardiac scoring wo IV contrast; Future  Other orders  -     Follow Up In Primary Care - Established; Future

## 2024-05-24 ENCOUNTER — OFFICE VISIT (OUTPATIENT)
Dept: PODIATRY | Facility: CLINIC | Age: 59
End: 2024-05-24
Payer: COMMERCIAL

## 2024-05-24 DIAGNOSIS — M79.672 FOOT PAIN, BILATERAL: ICD-10-CM

## 2024-05-24 DIAGNOSIS — M20.41 HAMMERTOES OF BOTH FEET: ICD-10-CM

## 2024-05-24 DIAGNOSIS — M19.071 OSTEOARTHRITIS OF ANKLE AND FOOT, RIGHT: Primary | ICD-10-CM

## 2024-05-24 DIAGNOSIS — M77.41 METATARSALGIA OF BOTH FEET: ICD-10-CM

## 2024-05-24 DIAGNOSIS — M79.671 FOOT PAIN, BILATERAL: ICD-10-CM

## 2024-05-24 DIAGNOSIS — M20.42 HAMMERTOES OF BOTH FEET: ICD-10-CM

## 2024-05-24 DIAGNOSIS — M77.42 METATARSALGIA OF BOTH FEET: ICD-10-CM

## 2024-05-24 DIAGNOSIS — M19.072 OSTEOARTHRITIS OF ANKLE AND FOOT, LEFT: ICD-10-CM

## 2024-05-24 PROCEDURE — 99203 OFFICE O/P NEW LOW 30 MIN: CPT | Performed by: PODIATRIST

## 2024-05-24 RX ORDER — SIMVASTATIN 40 MG/1
TABLET, FILM COATED ORAL
COMMUNITY
Start: 2019-08-16

## 2024-05-24 RX ORDER — TADALAFIL 20 MG/1
TABLET ORAL
COMMUNITY
Start: 2019-12-17

## 2024-05-24 RX ORDER — FLUTICASONE PROPIONATE 50 MCG
SPRAY, SUSPENSION (ML) NASAL EVERY 12 HOURS
COMMUNITY
Start: 2019-03-27
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
## 2024-06-01 ENCOUNTER — HOSPITAL ENCOUNTER (OUTPATIENT)
Dept: RADIOLOGY | Facility: CLINIC | Age: 59
Discharge: HOME | End: 2024-06-01
Payer: COMMERCIAL

## 2024-06-01 DIAGNOSIS — Z00.00 PHYSICAL EXAM: ICD-10-CM

## 2024-06-01 DIAGNOSIS — E78.1 HIGH TRIGLYCERIDES: ICD-10-CM

## 2024-06-01 PROCEDURE — 75571 CT HRT W/O DYE W/CA TEST: CPT

## 2024-06-07 ENCOUNTER — OFFICE VISIT (OUTPATIENT)
Dept: OPHTHALMOLOGY | Facility: CLINIC | Age: 59
End: 2024-06-07
Payer: COMMERCIAL

## 2024-06-07 DIAGNOSIS — H40.1331 PIGMENTARY GLAUCOMA OF BOTH EYES, MILD STAGE: Primary | ICD-10-CM

## 2024-06-07 DIAGNOSIS — Z96.1 PSEUDOPHAKIA OF BOTH EYES: ICD-10-CM

## 2024-06-07 DIAGNOSIS — H40.213 ACUTE PRIMARY ANGLE-CLOSURE GLAUCOMA OF BOTH EYES: ICD-10-CM

## 2024-06-07 PROCEDURE — 99213 OFFICE O/P EST LOW 20 MIN: CPT | Performed by: OPHTHALMOLOGY

## 2024-06-07 RX ORDER — DORZOLAMIDE HYDROCHLORIDE AND TIMOLOL MALEATE 20; 5 MG/ML; MG/ML
1 SOLUTION/ DROPS OPHTHALMIC 2 TIMES DAILY
Qty: 10 ML | Refills: 11 | Status: SHIPPED | OUTPATIENT
Start: 2024-06-07 | End: 2025-06-07

## 2024-06-07 RX ORDER — LATANOPROST 50 UG/ML
1 SOLUTION/ DROPS OPHTHALMIC NIGHTLY
Qty: 2.5 ML | Refills: 11 | Status: SHIPPED | OUTPATIENT
Start: 2024-06-07

## 2024-06-07 ASSESSMENT — REFRACTION_WEARINGRX
OS_SPHERE: PLANO
OD_CYLINDER: -3.50
OS_ADD: +2.25
OS_AXIS: 090
OD_ADD: +2.25
OS_CYLINDER: -3.50
OD_AXIS: 085
OD_SPHERE: -0.50

## 2024-06-07 ASSESSMENT — EXTERNAL EXAM - LEFT EYE: OS_EXAM: NORMAL

## 2024-06-07 ASSESSMENT — ENCOUNTER SYMPTOMS
NEUROLOGICAL NEGATIVE: 0
MUSCULOSKELETAL NEGATIVE: 0
EYES NEGATIVE: 1
RESPIRATORY NEGATIVE: 0
ALLERGIC/IMMUNOLOGIC NEGATIVE: 0
HEMATOLOGIC/LYMPHATIC NEGATIVE: 0
PSYCHIATRIC NEGATIVE: 0
CARDIOVASCULAR NEGATIVE: 0
ENDOCRINE NEGATIVE: 0
CONSTITUTIONAL NEGATIVE: 0
GASTROINTESTINAL NEGATIVE: 0

## 2024-06-07 ASSESSMENT — SLIT LAMP EXAM - LIDS
COMMENTS: NORMAL
COMMENTS: NORMAL

## 2024-06-07 ASSESSMENT — EXTERNAL EXAM - RIGHT EYE: OD_EXAM: NORMAL

## 2024-06-07 ASSESSMENT — VISUAL ACUITY
OS_CC: 20/20
CORRECTION_TYPE: GLASSES
METHOD: SNELLEN - LINEAR
OD_CC: 20/20-2

## 2024-06-07 ASSESSMENT — CONF VISUAL FIELD
OD_INFERIOR_NASAL_RESTRICTION: 0
OD_NORMAL: 1
OD_SUPERIOR_NASAL_RESTRICTION: 0
OD_SUPERIOR_TEMPORAL_RESTRICTION: 0
OS_SUPERIOR_NASAL_RESTRICTION: 0
OS_NORMAL: 1
OS_INFERIOR_NASAL_RESTRICTION: 0
OS_INFERIOR_TEMPORAL_RESTRICTION: 0
OD_INFERIOR_TEMPORAL_RESTRICTION: 0
METHOD: COUNTING FINGERS
OS_SUPERIOR_TEMPORAL_RESTRICTION: 0

## 2024-06-07 ASSESSMENT — CUP TO DISC RATIO
OD_RATIO: 0.8
OS_RATIO: 0.8

## 2024-06-07 ASSESSMENT — TONOMETRY
OS_IOP_MMHG: 15
IOP_METHOD: GOLDMANN APPLANATION
OD_IOP_MMHG: 15

## 2024-06-07 NOTE — PROGRESS NOTES
"Visual Acuity (Snellen - Linear)         Right Left    Dist cc 20/20-2 20/20      Correction: Glasses          Tonometry       Tonometry (Goldmann Applanation, 8:16 AM)         Right Left    Pressure 15 15                  Assessment/Plan   Last dilated:  12/1/23    1.  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 Pt has been off medications for 3-4 months.  There is evidence of progression.  Pt with h/o stinging with cosopt.  Combigan is expensive.  Pt states that the loss to follow up was financial due to needing knee surgery. s/p PE c PCIOl + trabectome OD (10/18/16):  pre-op VA 20/40 IOP 14 mmHg.  s/p PE c PCIOL + trabectome OS (12/14/16):  pre-op VA 20/20, IOP 18 mmHg.  Pt's IOP are elevated and HVF and RNFLs have progressed likely due to the period of his elevated IOP - pt ran out of drops for ~2 month period.      IOPs are now well controlled       Plan:  cont latanoprost OU QHS                cont dorzolamide/timolol OU BID                f/u 4 month    2.  Pseudophakia (PCIOL) OU:  pt with PCO OS and pt is symptomatic with driving \"looks smugey\").  S/p YAG Cap OS (LEFT) 2/5/24      Plan:  monitor    3.  Blepharitis OU:  pt very symptomatic     Plan:  cont artificial tears OU QID              cont lid hygene OU BID  "

## 2024-06-13 ENCOUNTER — ANESTHESIA EVENT (OUTPATIENT)
Dept: OPERATING ROOM | Facility: CLINIC | Age: 59
End: 2024-06-13
Payer: COMMERCIAL

## 2024-06-13 ENCOUNTER — HOSPITAL ENCOUNTER (OUTPATIENT)
Dept: OPERATING ROOM | Facility: CLINIC | Age: 59
Setting detail: OUTPATIENT SURGERY
Discharge: HOME | End: 2024-06-13
Payer: COMMERCIAL

## 2024-06-13 ENCOUNTER — ANESTHESIA (OUTPATIENT)
Dept: OPERATING ROOM | Facility: CLINIC | Age: 59
End: 2024-06-13
Payer: COMMERCIAL

## 2024-06-13 VITALS
SYSTOLIC BLOOD PRESSURE: 139 MMHG | BODY MASS INDEX: 34.31 KG/M2 | RESPIRATION RATE: 18 BRPM | HEIGHT: 72 IN | WEIGHT: 253.31 LBS | DIASTOLIC BLOOD PRESSURE: 85 MMHG | HEART RATE: 77 BPM | TEMPERATURE: 97.2 F | OXYGEN SATURATION: 97 %

## 2024-06-13 DIAGNOSIS — I85.00 ESOPHAGEAL VARICES WITHOUT BLEEDING, UNSPECIFIED ESOPHAGEAL VARICES TYPE (MULTI): ICD-10-CM

## 2024-06-13 PROBLEM — F41.9 ANXIETY: Status: ACTIVE | Noted: 2024-06-13

## 2024-06-13 PROBLEM — E03.9 HYPOTHYROIDISM: Status: ACTIVE | Noted: 2024-06-13

## 2024-06-13 PROCEDURE — 7100000010 HC PHASE TWO TIME - EACH INCREMENTAL 1 MINUTE: Performed by: ANESTHESIOLOGY

## 2024-06-13 PROCEDURE — 3700000001 HC GENERAL ANESTHESIA TIME - INITIAL BASE CHARGE: Performed by: ANESTHESIOLOGY

## 2024-06-13 PROCEDURE — 3600000002 HC OR TIME - INITIAL BASE CHARGE - PROCEDURE LEVEL TWO: Performed by: ANESTHESIOLOGY

## 2024-06-13 PROCEDURE — 3700000002 HC GENERAL ANESTHESIA TIME - EACH INCREMENTAL 1 MINUTE: Performed by: ANESTHESIOLOGY

## 2024-06-13 PROCEDURE — 3600000007 HC OR TIME - EACH INCREMENTAL 1 MINUTE - PROCEDURE LEVEL TWO: Performed by: ANESTHESIOLOGY

## 2024-06-13 PROCEDURE — 2500000004 HC RX 250 GENERAL PHARMACY W/ HCPCS (ALT 636 FOR OP/ED): Performed by: ANESTHESIOLOGIST ASSISTANT

## 2024-06-13 PROCEDURE — 2500000005 HC RX 250 GENERAL PHARMACY W/O HCPCS: Performed by: ANESTHESIOLOGIST ASSISTANT

## 2024-06-13 PROCEDURE — 7100000009 HC PHASE TWO TIME - INITIAL BASE CHARGE: Performed by: ANESTHESIOLOGY

## 2024-06-13 PROCEDURE — 43235 EGD DIAGNOSTIC BRUSH WASH: CPT | Performed by: INTERNAL MEDICINE

## 2024-06-13 RX ORDER — LIDOCAINE HYDROCHLORIDE 20 MG/ML
INJECTION, SOLUTION INFILTRATION; PERINEURAL AS NEEDED
Status: DISCONTINUED | OUTPATIENT
Start: 2024-06-13 | End: 2024-06-13

## 2024-06-13 RX ORDER — PROPOFOL 10 MG/ML
INJECTION, EMULSION INTRAVENOUS CONTINUOUS PRN
Status: DISCONTINUED | OUTPATIENT
Start: 2024-06-13 | End: 2024-06-13

## 2024-06-13 RX ORDER — SODIUM CHLORIDE, SODIUM LACTATE, POTASSIUM CHLORIDE, CALCIUM CHLORIDE 600; 310; 30; 20 MG/100ML; MG/100ML; MG/100ML; MG/100ML
INJECTION, SOLUTION INTRAVENOUS CONTINUOUS PRN
Status: DISCONTINUED | OUTPATIENT
Start: 2024-06-13 | End: 2024-06-13

## 2024-06-13 SDOH — HEALTH STABILITY: MENTAL HEALTH: CURRENT SMOKER: 0

## 2024-06-13 ASSESSMENT — ENCOUNTER SYMPTOMS
SHORTNESS OF BREATH: 0
ANAL BLEEDING: 0
CONSTIPATION: 0
TROUBLE SWALLOWING: 0
BLOOD IN STOOL: 0
COLOR CHANGE: 0
UNEXPECTED WEIGHT CHANGE: 0
ABDOMINAL PAIN: 0
FEVER: 0
DIARRHEA: 0
ABDOMINAL DISTENTION: 0
NAUSEA: 0
VOMITING: 0
RECTAL PAIN: 0
CHILLS: 0

## 2024-06-13 ASSESSMENT — PAIN SCALES - GENERAL
PAINLEVEL_OUTOF10: 0 - NO PAIN

## 2024-06-13 ASSESSMENT — PAIN - FUNCTIONAL ASSESSMENT
PAIN_FUNCTIONAL_ASSESSMENT: 0-10

## 2024-06-13 ASSESSMENT — COLUMBIA-SUICIDE SEVERITY RATING SCALE - C-SSRS
1. IN THE PAST MONTH, HAVE YOU WISHED YOU WERE DEAD OR WISHED YOU COULD GO TO SLEEP AND NOT WAKE UP?: NO
2. HAVE YOU ACTUALLY HAD ANY THOUGHTS OF KILLING YOURSELF?: NO
6. HAVE YOU EVER DONE ANYTHING, STARTED TO DO ANYTHING, OR PREPARED TO DO ANYTHING TO END YOUR LIFE?: NO

## 2024-06-13 NOTE — DISCHARGE INSTRUCTIONS
During the first 24 hours after your procedure, you should:    - Resume normal diet, unless otherwise directed by your doctor.  - Resume your home medications, unless otherwise directed by your doctor.  - Refrain from driving or operative heavy machinery.  - Drink plenty of liquids.  - Avoid consuming alcohol.  - Avoid strenuous activity or heavy lifting.    After 24 hours, you can resume regular activity.    Call your doctor office immediately ([phone]) or come to the nearest emergency room if you experience:    - Abdominal tenderness  - Blood in your stool or vomit  - Difficulty urinating or passing stools  - Difficulty breathing  - Chest pain  - Fever       If you experience any problems or have any questions following discharge from the GI Lab, please call:   Dr. Goyal [phone].   To reach your physician after hours call [phone] and ask for the GI physician on call.

## 2024-06-13 NOTE — ANESTHESIA PREPROCEDURE EVALUATION
Patient: Sp Morrison    Procedure Information       Date/Time: 06/13/24 1350    Scheduled providers: Adam Goyal MD    Procedure: EGD    Location: [facility] OR            Relevant Problems   Anesthesia (within normal limits)      Cardiac (within normal limits)      Pulmonary (within normal limits)      Neuro   (+) Anxiety      GI   (+) Esophageal varices without bleeding, unspecified esophageal varices type (Multi)      /Renal (within normal limits)      Liver (within normal limits)      Endocrine   (+) Hyperparathyroidism (Multi)   (+) Hypothyroidism      Hematology (within normal limits)      Musculoskeletal (within normal limits)      HEENT (within normal limits)      ID (within normal limits)      Skin (within normal limits)      GYN (within normal limits)       Clinical information reviewed:   Tobacco  Allergies  Meds   Med Hx  Surg Hx   Fam Hx  Soc Hx        NPO Detail:  NPO/Void Status  Date of Last Liquid: 06/13/24  Time of Last Liquid: 0900  Date of Last Solid: 06/12/24  Time of Last Solid: 2000  Last Intake Type: Clear fluids         Physical Exam    Airway  Mallampati: III  TM distance: >3 FB  Neck ROM: full     Cardiovascular   Rhythm: regular  Rate: normal     Dental    Pulmonary   Breath sounds clear to auscultation     Abdominal      Other findings: Denies loose/chipped/cracked teeth           Anesthesia Plan    History of general anesthesia?: yes  History of complications of general anesthesia?: no    ASA 3     MAC   (NPO>MN, h/o hep c, MAC ANESTHESIA EXPLAINED PT OK TO PROCEED)  The patient is not a current smoker.    Anesthetic plan and risks discussed with patient.    Plan discussed with CAA and attending.

## 2024-06-13 NOTE — H&P
History Of Present Illness  Sp Morrison is a 59 y.o. male presenting with egd.     Past Medical History  Past Medical History:   Diagnosis Date    Anxiety     Chondromalacia of right patella     Chronic prescription benzodiazepine use     Erectile dysfunction     GERD (gastroesophageal reflux disease)     Glaucoma     H/O esophageal varices     History of left knee replacement     Hypercalcemia     Hyperlipidemia     Hypothyroidism     Insomnia     Liver cirrhosis (Multi)     Rupture of anterior cruciate ligament of left knee, initial encounter     Tear of meniscus of left knee        Surgical History  Past Surgical History:   Procedure Laterality Date    CATARACT EXTRACTION      IRIDOTOMY / IRIDECTOMY      SHOULDER SURGERY          Social History  He reports that he has been smoking cigarettes. He has a 10 pack-year smoking history. He has never used smokeless tobacco. He reports that he does not currently use alcohol after a past usage of about 6.0 standard drinks of alcohol per week. He reports that he does not currently use drugs.    Family History  Family History   Problem Relation Name Age of Onset    Alcohol abuse Mother Mother     Depression Mother Mother     Stroke Mother Mother     No Known Problems Father      No Known Problems Sister      No Known Problems Brother      No Known Problems Daughter      No Known Problems Son      No Known Problems Mother's Sister      No Known Problems Mother's Brother      No Known Problems Father's Sister      No Known Problems Father's Brother      No Known Problems Maternal Grandmother      No Known Problems Maternal Grandfather      No Known Problems Paternal Grandmother      No Known Problems Paternal Grandfather      No Known Problems Other          Allergies  Patient has no known allergies.    Review of Systems   Constitutional:  Negative for chills, fever and unexpected weight change.   HENT:  Negative for trouble swallowing.    Respiratory:  Negative for  shortness of breath.    Cardiovascular:  Negative for chest pain.   Gastrointestinal:  Negative for abdominal distention, abdominal pain, anal bleeding, blood in stool, constipation, diarrhea, nausea, rectal pain and vomiting.   Skin:  Negative for color change.        Physical Exam  Constitutional:       General: He is awake.      Appearance: Normal appearance.   HENT:      Head: Normocephalic and atraumatic.      Nose: Nose normal.      Mouth/Throat:      Mouth: Mucous membranes are moist.   Eyes:      Pupils: Pupils are equal, round, and reactive to light.   Pulmonary:      Effort: Pulmonary effort is normal.   Neurological:      Mental Status: He is alert and oriented to person, place, and time. Mental status is at baseline.   Psychiatric:         Attention and Perception: Attention and perception normal.         Mood and Affect: Mood normal.         Behavior: Behavior normal.        Last Recorded Vitals  There were no vitals taken for this visit.    Relevant Results             Assessment/Plan   Proceed with colonoscopy    Adam Goyal MD

## 2024-06-13 NOTE — ANESTHESIA POSTPROCEDURE EVALUATION
Patient: Sp Morrison    Procedure Summary       Date: 06/13/24 Room / Location: [facility] OR    Anesthesia Start: 1401 Anesthesia Stop: 1414    Procedure: EGD Diagnosis: Esophageal varices without bleeding, unspecified esophageal varices type (Multi)    Scheduled Providers: Adam Goyal MD Responsible Provider: Franklyn Tavera MD    Anesthesia Type: MAC ASA Status: 3            Anesthesia Type: MAC    Vitals Value Taken Time   /85 06/13/24 1440   Temp 36.2 °C (97.2 °F) 06/13/24 1440   Pulse 77 06/13/24 1440   Resp 18 06/13/24 1440   SpO2 97 % 06/13/24 1440       Anesthesia Post Evaluation    Patient location during evaluation: bedside  Patient participation: complete - patient participated  Level of consciousness: awake  Pain management: adequate  Airway patency: patent  Cardiovascular status: acceptable  Respiratory status: acceptable  Hydration status: acceptable  Postoperative Nausea and Vomiting: none  Comments: Did well    There were no known notable events for this encounter.

## 2024-06-14 ASSESSMENT — PAIN SCALES - GENERAL: PAINLEVEL_OUTOF10: 0 - NO PAIN

## 2024-06-27 ENCOUNTER — APPOINTMENT (OUTPATIENT)
Dept: PODIATRY | Facility: CLINIC | Age: 59
End: 2024-06-27
Payer: COMMERCIAL

## 2024-07-02 ENCOUNTER — APPOINTMENT (OUTPATIENT)
Dept: BEHAVIORAL HEALTH | Facility: CLINIC | Age: 59
End: 2024-07-02
Payer: COMMERCIAL

## 2024-07-02 DIAGNOSIS — G47.00 INSOMNIA, UNSPECIFIED TYPE: ICD-10-CM

## 2024-07-02 DIAGNOSIS — F41.9 ANXIETY: ICD-10-CM

## 2024-07-02 PROCEDURE — 99214 OFFICE O/P EST MOD 30 MIN: CPT | Performed by: PSYCHIATRY & NEUROLOGY

## 2024-07-02 RX ORDER — QUETIAPINE FUMARATE 25 MG/1
TABLET, FILM COATED ORAL
Qty: 120 TABLET | Refills: 5 | Status: SHIPPED | OUTPATIENT
Start: 2024-07-02

## 2024-07-02 RX ORDER — ALPRAZOLAM 0.5 MG/1
0.5 TABLET ORAL DAILY
Qty: 30 TABLET | Refills: 2 | Status: SHIPPED | OUTPATIENT
Start: 2024-07-02

## 2024-07-02 ASSESSMENT — ENCOUNTER SYMPTOMS
SLEEP DISTURBANCE: 0
DECREASED CONCENTRATION: 0
DYSPHORIC MOOD: 0
NERVOUS/ANXIOUS: 0

## 2024-07-02 NOTE — PROGRESS NOTES
Subjective   Patient ID: Sp Morrisno is a 59 y.o. male.    HPI  Pt with some stressors: loss of best friend, busy with work;  Pt denies exacerbation of sleep /anxiety/mood sx;  Diagnoses of Insomnia, unspecified type and Anxiety were pertinent to this visit.    Review of Systems   Psychiatric/Behavioral:  Negative for decreased concentration, dysphoric mood, self-injury, sleep disturbance and suicidal ideas. The patient is not nervous/anxious.        Objective   Physical Exam  Psychiatric:         Attention and Perception: Attention normal.         Speech: Speech normal.         Behavior: Behavior normal.         Thought Content: Thought content is not paranoid. Thought content does not include homicidal or suicidal ideation.         Cognition and Memory: Cognition normal.     Mood/affect: full range, appropriate and expressive within normal range    Assessment/Plan   Diagnoses and all orders for this visit:  Insomnia, unspecified type  -     ALPRAZolam (Xanax) 0.5 mg tablet; Take 1 tablet (0.5 mg) by mouth once daily.  -     QUEtiapine (SEROquel) 25 mg tablet; TAKE 3 TO 4 TABLETS BY MOUTH AT BEDTIME AS NEEDED.  -     Follow Up In Psychiatry; Future  Anxiety  -     Follow Up In Psychiatry; Future    Current Outpatient Medications   Medication Instructions    acyclovir (Zovirax) 400 mg tablet Take 1 tablet twice daily as needed for suppression.    ALPRAZolam (XANAX) 0.5 mg, oral, Daily    colchicine 0.6 mg tablet Take 1.2 mg PO x1 then 0.6 mg PO 1 hour later x1  for 3 days. (max 1.8 mg total dose per treatment course).    dorzolamide-timoloL (Cosopt) 22.3-6.8 mg/mL ophthalmic solution 1 drop, Both Eyes, 2 times daily    fluticasone (Flonase) 50 mcg/actuation nasal spray nasal, Every 12 hours    latanoprost (Xalatan) 0.005 % ophthalmic solution 1 drop, Both Eyes, Nightly, INSTILL ONE DROP IN EACH EYE EVERY EVENING.    levothyroxine (SYNTHROID, LEVOXYL) 150 mcg, oral, Daily    omeprazole (PRILOSEC) 20 mg, oral,  Daily    QUEtiapine (SEROquel) 25 mg tablet TAKE 3 TO 4 TABLETS BY MOUTH AT BEDTIME AS NEEDED.    simvastatin (Zocor) 40 mg tablet oral, Daily RT    tadalafil 20 mg tablet oral

## 2024-08-15 DIAGNOSIS — E03.9 HYPOTHYROIDISM, UNSPECIFIED TYPE: ICD-10-CM

## 2024-08-15 RX ORDER — LEVOTHYROXINE SODIUM 150 UG/1
150 TABLET ORAL DAILY
Qty: 90 TABLET | Refills: 0 | Status: SHIPPED | OUTPATIENT
Start: 2024-08-15 | End: 2025-08-15

## 2024-09-24 ENCOUNTER — APPOINTMENT (OUTPATIENT)
Dept: BEHAVIORAL HEALTH | Facility: CLINIC | Age: 59
End: 2024-09-24
Payer: COMMERCIAL

## 2024-09-24 DIAGNOSIS — G47.00 INSOMNIA, UNSPECIFIED TYPE: ICD-10-CM

## 2024-09-24 RX ORDER — ALPRAZOLAM 0.5 MG/1
0.5 TABLET ORAL DAILY
Qty: 30 TABLET | Refills: 2 | Status: SHIPPED | OUTPATIENT
Start: 2024-09-24

## 2024-09-24 NOTE — PROGRESS NOTES
Subjective   Patient ID: Sp Morrison is a 59 y.o. male.    HPI  The encounter diagnosis was Insomnia, unspecified type.  Pt is managing to cope with day to day stressors and denies exacerbation of sx;  Review of Systems   Neurological:  Negative for difficulty with concentration.     Psych Review of Symptoms:    Anxiety:   Generalized Anxiety Symptoms: No difficulty controlling worry, no excessive worry and no difficulty with concentration.  Additional Anxiety Symptoms: No panic attacks.        Depressive Symptoms:   No hopelessness and no suicidal ideation.      Manic Symptoms: Patient denied any symptoms.         Obsessive-Compulsive Symptoms: Patient denied any symptoms.         Psychotic Symptoms: Patient denied any symptoms.             Objective     Physical Exam  Psychiatric:         Attention and Perception: Attention normal.         Speech: Speech normal.         Behavior: Behavior normal.         Thought Content: Thought content is not paranoid. Thought content does not include homicidal or suicidal ideation.         Cognition and Memory: Cognition normal.         Assessment/Plan   Diagnoses and all orders for this visit:  Insomnia, unspecified type  -     ALPRAZolam (Xanax) 0.5 mg tablet; Take 1 tablet (0.5 mg) by mouth once daily.  -     Follow Up In Psychiatry; Future

## 2024-09-26 ENCOUNTER — OFFICE VISIT (OUTPATIENT)
Dept: URGENT CARE | Age: 59
End: 2024-09-26
Payer: COMMERCIAL

## 2024-09-26 VITALS
TEMPERATURE: 97.3 F | OXYGEN SATURATION: 95 % | SYSTOLIC BLOOD PRESSURE: 131 MMHG | HEART RATE: 81 BPM | DIASTOLIC BLOOD PRESSURE: 83 MMHG

## 2024-09-26 DIAGNOSIS — M10.9 GOUTY ARTHRITIS OF LEFT GREAT TOE: Primary | ICD-10-CM

## 2024-09-26 RX ORDER — COLCHICINE 0.6 MG/1
TABLET ORAL
Qty: 3 TABLET | Refills: 0 | Status: SHIPPED | OUTPATIENT
Start: 2024-09-26

## 2024-09-26 RX ORDER — PREDNISONE 20 MG/1
40 TABLET ORAL DAILY
Qty: 10 TABLET | Refills: 0 | Status: SHIPPED | OUTPATIENT
Start: 2024-09-26 | End: 2024-10-01

## 2024-09-26 NOTE — PATIENT INSTRUCTIONS
You were seen for Gout    -Return if the area of redness progresses or if you experience worsening symptoms.  -Follow-up with your primary care physician in 2 days for reevaluation.    Plan:    - Take medications as prescribed.  - Take colchicine.   - Take Indomethacin as prescribed  -Take Keflex as prescribed for possible infection  - Follow up with your PCP in 2 days for reevaluation.  - Return to UC or ED if any new or worsening symptoms.      Chronic management: Usually managed through PCP or rheumatology.  You may be placed on allopurinol which reduces uric acid production by inhibiting xanthine oxidase and increases uric acid excretion.  Can with meals to prevent gastric irritation probenecid and Sulfinpyrazone promote renal uric acid secretion.  These drugs are only used to prevent the next attack of gout.  They are never used an acute gouty attack.        What is gout?     If you have gout, your body makes too much uric acid. Uric acid forms normally as your body breaks down old cells and makes new cells. In gout, the uric acid forms into crystals that sometimes deposit in or around your joints. The crystals cause severe joint pain, redness, and swelling.    ° Gout comes in sudden attacks that usually affect only one joint      ° The attacks go away in a few days      ° Some people have several attacks a year      ° If you have a lot of attacks, your joints may be damaged and may hurt all the time      ° Gout is more common in men than in women      ° Gout usually happens during middle age, but younger people can get gout      ° To see if you have gout, doctors may use a needle to take fluid from your joint for tests      ° Doctors treat gout attacks with medicine to lessen pain and swelling      ° If you have frequent attacks, you may need medicine to help prevent them          What causes gout?     Gout is caused by high levels of uric acid in your blood. Very high levels of uric acid can form  microscopic crystals that build up in your joints or under your skin (tophi). The buildup of crystals causes swelling and pain and may lead to joint damage.     You´re more likely to get gout if you:  ° Have a close relative with gout-gout runs in families  ° Have kidney problems, such as kidney disease  ° Drink a lot of alcohol  ° Eat a lot of certain foods that increase uric acid levels-such as liver, kidney, herring, mussels, sardines, asparagus, and mushrooms  ° Take certain medicines  ° Have lead poisoning (when lead builds up in your body)  ° Are overweight or obese  ° Have metabolic syndrome (a condition that can cause a large waist, high blood pressure, and high blood sugar levels)  ° Are having chemotherapy or radiation treatment for cancer    Symptoms and Signs :    Acute gouty arthritis usually begins with sudden onset of pain (often nocturnal). The metatarsophalangeal joint of a great toe is most often involved (called podagra), but the instep, ankle, knee, wrist, and elbow are also common sites. Rarely, the hip, shoulder, sacroiliac, sternoclavicular, or cervical spine joints are involved. The pain becomes progressively more severe, usually over a few hours, and is often excruciating. Swelling, warmth, redness, and exquisite tenderness may suggest infection. The overlying skin may become tense, warm, shiny, and red or purplish. Fever, tachycardia, chills, and malaise sometimes occur.    Treatment of acute attacks:    NSAIDs are effective in treating acute attacks and are generally well-tolerated. However, they can have adverse effects, including GI upset or bleeding, hyperkalemia, increases in creatinine, and fluid retention. Elderly and dehydrated patients are at particular risk, especially if there is a history of renal disease. Virtually any NSAID used in anti-inflammatory (high) doses is effective and is likely to exert an analgesic effect in a few hours. Treatment should be continued for several  days after the pain and signs of inflammation have resolved to prevent relapse.     Oral colchicine, a traditional therapy, often produces a dramatic response if begun soon after the onset of symptoms; it is most effective if started within 12 to 24 h of an acute attack. A dose of 1.2 mg can be followed with 0.6 mg 1 h later; joint pain tends to decrease after 12 to 24 h and sometimes ceases within 3 to 7 days, but usually > 3 doses are needed to achieve resolution. If colchicine is tolerated, 0.6 to 1.2 mg once/day can be continued as the attack subsides. Renal insufficiency and drug interactions, especially with clarithromycin, may warrant reduction of dosage or use of other treatments. GI upset and diarrhea are common adverse effects.    Corticosteroids are used to treat acute attacks. Aspiration of affected joints, followed by instillation of corticosteroid abril crystal suspension, is very effective, particularly for monarticular symptoms; prednisolone tebutate 4 to 40 mg or prednisolone acetate 5 to 25 mg can be used, with dose depending on the size of the affected joint. Oral prednisone (about 0.5 mg/kg once/day), IM or IV corticosteroids, or single-dose ACTH 80 U IM is also very effective, particularly if multiple joints are involved. As with NSAID therapy, corticosteroids should be continued until after the attack fully resolves to prevent relapse.     If monotherapy is ineffective or doses (eg, of NSAIDs) are limited by toxicity, colchicine can be combined with NSAIDs or corticosteroids.     In addition to NSAIDs or corticosteroids, supplementary analgesics, rest, ice application, and splinting of the inflamed joint may be helpful. Because lowering the serum urate level during an attack may prolong the attack or predispose to recurrence, drugs that lower the serum urate level should not be initiated until acute symptoms have been completely controlled or until the patient can be maintained on high doses of  anti-inflammatory therapy. If patients are taking urate-lowering drugs when an acute attack begins, the drugs should be continued at the same dose; dose adjustments are deferred until the attack has subsided.

## 2024-09-26 NOTE — PROGRESS NOTES
Subjective   Patient ID: Sp Morrison is a 59 y.o. male. They present today with a chief complaint of Toe Pain.    History of Present Illness  HPI    CC: Great eft toe pain    HPI: Patient presenting for acute, nontraumatic, great left toe pain that started yesterday.  Denies open wounds, streaking redness, purulent discharge, limitations with joint mobility, ankle, calf pain.  No fever.  No other concerns or complaints.  Denies history of gout.  Denied recent strenuous activity, jumping, excessive walking.  Does like to drink beer, eat cheese, and meat.    Past Medical History  Allergies as of 09/26/2024    (No Known Allergies)       (Not in a hospital admission)         Past Medical History:   Diagnosis Date    Anxiety     Chondromalacia of right patella     Chronic prescription benzodiazepine use     Erectile dysfunction     GERD (gastroesophageal reflux disease)     Glaucoma     H/O esophageal varices     History of left knee replacement     Hypercalcemia     Hyperlipidemia     Hypothyroidism     Insomnia     Liver cirrhosis (Multi)     Rupture of anterior cruciate ligament of left knee, initial encounter     Tear of meniscus of left knee        Past Surgical History:   Procedure Laterality Date    CATARACT EXTRACTION      IRIDOTOMY / IRIDECTOMY      SHOULDER SURGERY          reports that he has been smoking cigarettes. He has a 10 pack-year smoking history. He has never used smokeless tobacco. He reports current alcohol use of about 12.0 standard drinks of alcohol per week. He reports that he does not currently use drugs.    Review of Systems  Review of Systems      After reviewing all body systems I have documented pertinent findings above in the history.  All other Systems reviewed and are negative for complaint.  Pertinent positive and negatives are listed in the above HPI.        Objective    Vitals:    09/26/24 0815   BP: 131/83   Pulse: 81   Temp: 36.3 °C (97.3 °F)   SpO2: 95%     No LMP for male  patient.    Physical Exam    General: No acute distress. Well developed, well nourished.   Skin: Skin is warm, and dry. No rashes or lesions. Nailbeds pink.  Neck: Supple.   Cardiac: Regular rate  Respiratory:  No acute respiratory distress.  Regular rate of breathing    MSK: Left great toe is slightly erythematous.  Painful with light touch.  No open wounds.  No overlying petechiae or bruising.  Ankle and Foot appears atraumatic without deformity.   Nails are normal.  Webspaces are normal.  No mottling.  No crepitus.  Compartments are all soft. Achilles tendon is intact.  Good ROM and strength. Good plantar and dorsiflexion.  No tenderness to palpation over the calcaneus, metatarsals 1 through 5.  No sensory deficits. DP Pulses palpable 2+ BL. No tenderness over the tibia, fibula, and knee joint.    Neurological: A&Ox4. Normal speech, steady gait.   Psych:  Normal affect.  Cooperative.    Procedures      No results found.    Diagnostic study results (if any) were reviewed by Meng Morel PA-C.    Assessment/Plan   Allergies, medications, history, and pertinent labs/EKGs/Imaging reviewed by Meng Morel PA-C.         MDM:   Patient is in no acute distress. Does not appear ill or toxic.  Patient is afebrile.  No tachycardia.  Skin is intact. No lymphangitis or evidence of systemic symptoms. No pain out of proportion or rapid progression of disease. No bullae, or necrosis. No clinical findings suggestive of drug reaction/hypersensitivity reaction/allergy, abscess, necrotizing fasciitis, infectious process or sepsis. Findings are most consistent to Gout.   Counseled patient regarding findings, treatment, and return precautions. Return if the area progresses or if experience worsening symptoms.  Advise follow-up with PCP within two days for reevaluation and resolution. Patient/family expressed understanding and consented to the above plan. No barriers of communication were apparent and I answered all  questions.    There are no diagnoses linked to this encounter.    Medical Admin Record      Patient disposition: Home    Electronically signed by Meng Morel PA-C  8:19 AM

## 2024-09-28 ASSESSMENT — ENCOUNTER SYMPTOMS
DIFFICULTY WITH CONCENTRATION: 0
HOPELESSNESS: 0
PANIC: 0

## 2024-10-07 ENCOUNTER — APPOINTMENT (OUTPATIENT)
Dept: OPHTHALMOLOGY | Facility: CLINIC | Age: 59
End: 2024-10-07
Payer: COMMERCIAL

## 2024-11-04 ENCOUNTER — APPOINTMENT (OUTPATIENT)
Dept: PRIMARY CARE | Facility: CLINIC | Age: 59
End: 2024-11-04
Payer: COMMERCIAL

## 2024-11-10 DIAGNOSIS — E03.9 HYPOTHYROIDISM, UNSPECIFIED TYPE: ICD-10-CM

## 2024-11-12 RX ORDER — LEVOTHYROXINE SODIUM 150 UG/1
150 TABLET ORAL DAILY
Qty: 90 TABLET | Refills: 0 | Status: SHIPPED | OUTPATIENT
Start: 2024-11-12

## 2024-11-15 ENCOUNTER — APPOINTMENT (OUTPATIENT)
Dept: OPHTHALMOLOGY | Facility: CLINIC | Age: 59
End: 2024-11-15
Payer: COMMERCIAL

## 2024-12-17 ENCOUNTER — APPOINTMENT (OUTPATIENT)
Dept: BEHAVIORAL HEALTH | Facility: CLINIC | Age: 59
End: 2024-12-17
Payer: COMMERCIAL

## 2024-12-17 DIAGNOSIS — G47.00 INSOMNIA, UNSPECIFIED TYPE: ICD-10-CM

## 2024-12-17 DIAGNOSIS — F41.9 ANXIETY: ICD-10-CM

## 2024-12-17 PROCEDURE — 99214 OFFICE O/P EST MOD 30 MIN: CPT | Performed by: PSYCHIATRY & NEUROLOGY

## 2024-12-17 RX ORDER — QUETIAPINE FUMARATE 25 MG/1
TABLET, FILM COATED ORAL
Qty: 150 TABLET | Refills: 2 | Status: SHIPPED | OUTPATIENT
Start: 2024-12-17

## 2024-12-17 RX ORDER — ALPRAZOLAM 0.5 MG/1
0.5 TABLET ORAL DAILY
Qty: 30 TABLET | Refills: 2 | Status: SHIPPED | OUTPATIENT
Start: 2024-12-17

## 2024-12-17 NOTE — PROGRESS NOTES
Subjective   Patient ID: Sp Morrison is a 59 y.o. male.    HPI  Diagnoses of Insomnia, unspecified type and Anxiety were pertinent to this visit.  Discussed re-establishing with primary care and pt gave verbal understanding and acknowledgement;  Pt reports sleep and anxiety continue to be managed on current medicatons.    Review of Systems   Psychiatric/Behavioral:  Negative for agitation, confusion, decreased concentration, dysphoric mood, sleep disturbance and suicidal ideas.          Objective   Physical Exam  Psychiatric:         Speech: Speech normal.         Behavior: Behavior normal. Behavior is cooperative.         Thought Content: Thought content is not paranoid or delusional. Thought content does not include homicidal or suicidal ideation. Thought content does not include homicidal or suicidal plan.         Judgment: Judgment normal.     Mood/affect: appropriate, full range, congruent    Assessment/Plan   Diagnoses and all orders for this visit:  Insomnia, unspecified type  -     ALPRAZolam (Xanax) 0.5 mg tablet; Take 1 tablet (0.5 mg) by mouth once daily.  -     QUEtiapine (SEROquel) 25 mg tablet; TAKE 4 to 5  TABLETS BY MOUTH AT BEDTIME AS NEEDED.  Anxiety  -     Follow Up In Psychiatry; Future

## 2024-12-24 ASSESSMENT — ENCOUNTER SYMPTOMS
DECREASED CONCENTRATION: 0
DYSPHORIC MOOD: 0
AGITATION: 0
CONFUSION: 0
SLEEP DISTURBANCE: 0

## 2025-02-04 ENCOUNTER — APPOINTMENT (OUTPATIENT)
Facility: CLINIC | Age: 60
End: 2025-02-04
Payer: COMMERCIAL

## 2025-02-04 VITALS
WEIGHT: 249 LBS | HEART RATE: 85 BPM | TEMPERATURE: 97.7 F | HEIGHT: 71 IN | BODY MASS INDEX: 34.86 KG/M2 | DIASTOLIC BLOOD PRESSURE: 83 MMHG | SYSTOLIC BLOOD PRESSURE: 120 MMHG

## 2025-02-04 DIAGNOSIS — E03.9 HYPOTHYROIDISM, UNSPECIFIED TYPE: Primary | ICD-10-CM

## 2025-02-04 DIAGNOSIS — E21.3 HYPERPARATHYROIDISM (MULTI): ICD-10-CM

## 2025-02-04 PROCEDURE — 99214 OFFICE O/P EST MOD 30 MIN: CPT | Performed by: STUDENT IN AN ORGANIZED HEALTH CARE EDUCATION/TRAINING PROGRAM

## 2025-02-04 PROCEDURE — 3008F BODY MASS INDEX DOCD: CPT | Performed by: STUDENT IN AN ORGANIZED HEALTH CARE EDUCATION/TRAINING PROGRAM

## 2025-02-04 NOTE — PATIENT INSTRUCTIONS
Continue levothyroxine 150 mcg daily to be taken on an empty stomach on its own 30min before other meds or food   Blood work today  We will also check Calcium vitamin D    RTC in 6 months

## 2025-02-04 NOTE — PROGRESS NOTES
"Subjective   Sp Morrison is a 59 y.o. male who I am asked to see in consultation for evaluation of thyroid function. Used to follow with Donna Ovalle with hypothyroid less than 10 years  Initially was always tired  Has been on LT4 150 mcg daily usually takes it between 2-4 am when he wakes up to pee. Was previously on LT4 175 changed to 150 in Nov 2023  Goes to bed at 8 pm. Ice cream before bed  Wakes up at 5 am eats at 5:30 with coffee  Does not skip thyroid pill except 1 every 2-3 weeks. Feels tired if he does not take it   Latest Reference Range & Units Most Recent 08/01/22 08:50 08/15/23 08:32 11/17/23 14:08   Thyroxine, Free 0.78 - 1.48 ng/dL 1.50 (H)  11/17/23 14:08 1.76 (H) 0.74 1.50 (H)   Thyroid Stimulating Hormone 0.44 - 3.98 mIU/L 0.03 (L)  11/17/23 14:08 1.74 0.16 (L) 0.03 (L)      Energy: feels okay. Does not nap    Sleep: around 9 pm and wakes up 6 am. Difficulty falling asleep sometimes  Weight: Lost few lbs since his new Job.  BM: Constipation   Cold or heat intolerance: No change  Palpitations or tremors:  HF or NS: No  Skin or hair changes: No   Cramps: Yes.   Tingling numbness: Yes   Compressive symptoms:  - Hoarseness: No  - SOB while lying flat: No  - Dysphagia: No  No fhx of hypothyroidism  Family history of thyroid cancer: No  No hx of kidney stones  No hx of Fx    Meds:  Prolisec  Xanax  Seroquel  Levothyroxine  Centrum MVI      Review of Systems  all pertinent systems reviewed and are otherwise negative   Objective   /83 (BP Location: Right arm, Patient Position: Sitting, BP Cuff Size: Large adult)   Pulse 85   Temp 36.5 °C (97.7 °F)   Ht 1.803 m (5' 11\")   Wt 113 kg (249 lb)   BMI 34.73 kg/m²    Physical Exam  Constitutional:       General: He is not in acute distress.     Appearance: Normal appearance. He is obese.   HENT:      Head: Normocephalic and atraumatic.   Eyes:      Extraocular Movements: Extraocular movements intact.      Pupils: Pupils are equal, round, and " reactive to light.   Cardiovascular:      Rate and Rhythm: Normal rate and regular rhythm.   Pulmonary:      Effort: Pulmonary effort is normal. No respiratory distress.      Breath sounds: Normal breath sounds.   Abdominal:      General: Bowel sounds are normal.      Palpations: Abdomen is soft.      Tenderness: There is no abdominal tenderness.   Skin:     Coloration: Skin is not jaundiced or pale.      Findings: No erythema or rash.   Neurological:      General: No focal deficit present.      Mental Status: He is alert and oriented to person, place, and time.      Deep Tendon Reflexes: Reflexes normal.   Psychiatric:         Mood and Affect: Mood normal.         Behavior: Behavior normal.         Lab Results   Component Value Date    TSH 0.03 (L) 11/17/2023    FREET4 1.50 (H) 11/17/2023       Assessment/Plan   Sp Morrison is a 59 y.o. male who I am asked to see in consultation for evaluation of thyroid function. Used to follow with Donna Ovalle with hypothyroid less than 10 years  Initially was always tired  Has been on LT4 150 mcg daily usually takes it between 2-4 am when he wakes up to pee. Was previously on LT4 175 changed to 150 in Nov 2023  Wakes up at 5 am eats at 5:30 with coffee  Does not skip thyroid pill except 1 every 2-3 weeks. Feels tired if he does not take it   Latest Reference Range & Units Most Recent 08/01/22 08:50 08/15/23 08:32 11/17/23 14:08   Thyroxine, Free 0.78 - 1.48 ng/dL 1.50 (H)  11/17/23 14:08 1.76 (H) 0.74 1.50 (H)   Thyroid Stimulating Hormone 0.44 - 3.98 mIU/L 0.03 (L)  11/17/23 14:08 1.74 0.16 (L) 0.03 (L)   Weight: Lost few lbs since his new Job.  No fhx of hypothyroidism  Family history of thyroid cancer: No  No hx of kidney stones  No hx of Fx    Clinically euthyroid    Plan:  Continue levothyroxine 150 mcg daily to be taken on an empty stomach on its own 30min before other meds or food   Blood work today  We will also check Calcium vitamin D    RTC in 6 months

## 2025-02-06 LAB
25(OH)D3+25(OH)D2 SERPL-MCNC: 28 NG/ML (ref 30–100)
ALBUMIN SERPL-MCNC: 4.9 G/DL (ref 3.6–5.1)
BUN SERPL-MCNC: 13 MG/DL (ref 7–25)
BUN/CREAT SERPL: ABNORMAL (CALC) (ref 6–22)
CALCIUM SERPL-MCNC: 11.8 MG/DL (ref 8.6–10.3)
CHLORIDE SERPL-SCNC: 105 MMOL/L (ref 98–110)
CO2 SERPL-SCNC: 24 MMOL/L (ref 20–32)
CREAT SERPL-MCNC: 0.9 MG/DL (ref 0.7–1.35)
EGFRCR SERPLBLD CKD-EPI 2021: 98 ML/MIN/1.73M2
GLUCOSE SERPL-MCNC: 105 MG/DL (ref 65–99)
PHOSPHATE SERPL-MCNC: 2.9 MG/DL (ref 2.5–4.5)
POTASSIUM SERPL-SCNC: 4.2 MMOL/L (ref 3.5–5.3)
PTH-INTACT SERPL-MCNC: 101 PG/ML (ref 16–77)
SODIUM SERPL-SCNC: 138 MMOL/L (ref 135–146)
T4 FREE SERPL-MCNC: 1.2 NG/DL (ref 0.8–1.8)
THYROPEROXIDASE AB SERPL-ACNC: 109 IU/ML
TSH SERPL-ACNC: 1.36 MIU/L (ref 0.4–4.5)

## 2025-02-12 ENCOUNTER — APPOINTMENT (OUTPATIENT)
Dept: PRIMARY CARE | Facility: CLINIC | Age: 60
End: 2025-02-12
Payer: COMMERCIAL

## 2025-02-12 VITALS
TEMPERATURE: 97.7 F | HEART RATE: 82 BPM | DIASTOLIC BLOOD PRESSURE: 78 MMHG | SYSTOLIC BLOOD PRESSURE: 119 MMHG | OXYGEN SATURATION: 95 % | BODY MASS INDEX: 34.21 KG/M2 | WEIGHT: 245.3 LBS | RESPIRATION RATE: 18 BRPM

## 2025-02-12 DIAGNOSIS — E78.1 HIGH TRIGLYCERIDES: Primary | ICD-10-CM

## 2025-02-12 DIAGNOSIS — E03.9 HYPOTHYROIDISM, UNSPECIFIED TYPE: ICD-10-CM

## 2025-02-12 DIAGNOSIS — E21.3 HYPERPARATHYROIDISM (MULTI): ICD-10-CM

## 2025-02-12 DIAGNOSIS — Z12.5 PROSTATE CANCER SCREENING: ICD-10-CM

## 2025-02-12 PROBLEM — K59.00 CONSTIPATION: Status: ACTIVE | Noted: 2025-02-12

## 2025-02-12 PROBLEM — G47.00 INSOMNIA: Status: ACTIVE | Noted: 2025-02-12

## 2025-02-12 PROBLEM — B19.20 HEPATITIS C VIRUS INFECTION WITHOUT HEPATIC COMA: Status: ACTIVE | Noted: 2017-02-07

## 2025-02-12 PROBLEM — S83.512A LEFT ANTERIOR CRUCIATE LIGAMENT TEAR: Status: ACTIVE | Noted: 2025-02-12

## 2025-02-12 PROBLEM — Z86.39 HISTORY OF THYROID DISORDER: Status: ACTIVE | Noted: 2025-02-12

## 2025-02-12 PROBLEM — E78.5 HYPERLIPIDEMIA: Status: ACTIVE | Noted: 2025-02-12

## 2025-02-12 PROBLEM — N52.9 ERECTILE DYSFUNCTION: Status: ACTIVE | Noted: 2025-02-12

## 2025-02-12 PROBLEM — Z96.652 HISTORY OF LEFT KNEE REPLACEMENT: Status: ACTIVE | Noted: 2025-02-12

## 2025-02-12 PROCEDURE — 99214 OFFICE O/P EST MOD 30 MIN: CPT | Performed by: FAMILY MEDICINE

## 2025-02-12 RX ORDER — LEVOTHYROXINE SODIUM 150 UG/1
150 TABLET ORAL DAILY
Qty: 90 TABLET | Refills: 3 | Status: SHIPPED | OUTPATIENT
Start: 2025-02-12

## 2025-02-12 ASSESSMENT — PATIENT HEALTH QUESTIONNAIRE - PHQ9
2. FEELING DOWN, DEPRESSED OR HOPELESS: NOT AT ALL
1. LITTLE INTEREST OR PLEASURE IN DOING THINGS: NOT AT ALL
SUM OF ALL RESPONSES TO PHQ9 QUESTIONS 1 AND 2: 0

## 2025-02-12 NOTE — PROGRESS NOTES
Subjective   Patient ID: Sp Morrison is a 59 y.o. male who presents for No chief complaint on file..    HPI   6 month follow up    Review of Systems   All other systems reviewed and are negative.      Objective   /78 (BP Location: Right arm, Patient Position: Sitting, BP Cuff Size: Adult)   Pulse 82   Temp 36.5 °C (97.7 °F) (Temporal)   Resp 18   Wt 111 kg (245 lb 4.8 oz)   SpO2 95%   BMI 34.21 kg/m²     Physical Exam  Vitals and nursing note reviewed.   Cardiovascular:      Rate and Rhythm: Normal rate and regular rhythm.   Pulmonary:      Effort: Pulmonary effort is normal.      Breath sounds: Normal breath sounds.   Abdominal:      Palpations: Abdomen is soft.      Tenderness: There is no abdominal tenderness.   Musculoskeletal:      Cervical back: Neck supple.   Lymphadenopathy:      Cervical: No cervical adenopathy.   Neurological:      Mental Status: He is alert.   Psychiatric:         Mood and Affect: Mood normal.         Behavior: Behavior normal.         Thought Content: Thought content normal.         Judgment: Judgment normal.         Assessment/Plan   Diagnoses and all orders for this visit:  High triglycerides  -     Comprehensive Metabolic Panel; Future  -     CBC and Auto Differential; Future  -     Lipid panel; Future  -     Hemoglobin A1c; Future  -     US abdomen limited liver; Future  Hyperparathyroidism (Multi)  Comments:  follows with Endocrinology  Hypothyroidism, unspecified type  -     levothyroxine (Synthroid, Levoxyl) 150 mcg tablet; Take 1 tablet (150 mcg) by mouth once daily.  Prostate cancer screening  -     PSA; Future

## 2025-02-13 LAB
ALBUMIN SERPL-MCNC: 4.5 G/DL (ref 3.6–5.1)
ALP SERPL-CCNC: 63 U/L (ref 35–144)
ALT SERPL-CCNC: 16 U/L (ref 9–46)
ANION GAP SERPL CALCULATED.4IONS-SCNC: 9 MMOL/L (CALC) (ref 7–17)
AST SERPL-CCNC: 17 U/L (ref 10–35)
BASOPHILS # BLD AUTO: 62 CELLS/UL (ref 0–200)
BASOPHILS NFR BLD AUTO: 1 %
BILIRUB SERPL-MCNC: 0.3 MG/DL (ref 0.2–1.2)
BUN SERPL-MCNC: 13 MG/DL (ref 7–25)
CALCIUM SERPL-MCNC: 11.3 MG/DL (ref 8.6–10.3)
CHLORIDE SERPL-SCNC: 107 MMOL/L (ref 98–110)
CHOLEST SERPL-MCNC: 205 MG/DL
CHOLEST/HDLC SERPL: 4.3 (CALC)
CO2 SERPL-SCNC: 26 MMOL/L (ref 20–32)
CREAT SERPL-MCNC: 0.84 MG/DL (ref 0.7–1.35)
EGFRCR SERPLBLD CKD-EPI 2021: 100 ML/MIN/1.73M2
EOSINOPHIL # BLD AUTO: 99 CELLS/UL (ref 15–500)
EOSINOPHIL NFR BLD AUTO: 1.6 %
ERYTHROCYTE [DISTWIDTH] IN BLOOD BY AUTOMATED COUNT: 12.7 % (ref 11–15)
EST. AVERAGE GLUCOSE BLD GHB EST-MCNC: 117 MG/DL
EST. AVERAGE GLUCOSE BLD GHB EST-SCNC: 6.5 MMOL/L
GLUCOSE SERPL-MCNC: 103 MG/DL (ref 65–139)
HBA1C MFR BLD: 5.7 % OF TOTAL HGB
HCT VFR BLD AUTO: 45.2 % (ref 38.5–50)
HDLC SERPL-MCNC: 48 MG/DL
HGB BLD-MCNC: 14.7 G/DL (ref 13.2–17.1)
LDLC SERPL CALC-MCNC: 105 MG/DL (CALC)
LYMPHOCYTES # BLD AUTO: 2077 CELLS/UL (ref 850–3900)
LYMPHOCYTES NFR BLD AUTO: 33.5 %
MCH RBC QN AUTO: 30.8 PG (ref 27–33)
MCHC RBC AUTO-ENTMCNC: 32.5 G/DL (ref 32–36)
MCV RBC AUTO: 94.8 FL (ref 80–100)
MONOCYTES # BLD AUTO: 645 CELLS/UL (ref 200–950)
MONOCYTES NFR BLD AUTO: 10.4 %
NEUTROPHILS # BLD AUTO: 3317 CELLS/UL (ref 1500–7800)
NEUTROPHILS NFR BLD AUTO: 53.5 %
NONHDLC SERPL-MCNC: 157 MG/DL (CALC)
PLATELET # BLD AUTO: 294 THOUSAND/UL (ref 140–400)
PMV BLD REES-ECKER: 10.2 FL (ref 7.5–12.5)
POTASSIUM SERPL-SCNC: 4.3 MMOL/L (ref 3.5–5.3)
PROT SERPL-MCNC: 6.9 G/DL (ref 6.1–8.1)
PSA SERPL-MCNC: 1.46 NG/ML
RBC # BLD AUTO: 4.77 MILLION/UL (ref 4.2–5.8)
SODIUM SERPL-SCNC: 142 MMOL/L (ref 135–146)
TRIGL SERPL-MCNC: 387 MG/DL
WBC # BLD AUTO: 6.2 THOUSAND/UL (ref 3.8–10.8)

## 2025-02-19 ENCOUNTER — OFFICE VISIT (OUTPATIENT)
Dept: PRIMARY CARE | Facility: CLINIC | Age: 60
End: 2025-02-19
Payer: COMMERCIAL

## 2025-02-19 VITALS
DIASTOLIC BLOOD PRESSURE: 79 MMHG | TEMPERATURE: 97.7 F | HEART RATE: 96 BPM | OXYGEN SATURATION: 92 % | WEIGHT: 240.3 LBS | SYSTOLIC BLOOD PRESSURE: 122 MMHG | RESPIRATION RATE: 18 BRPM | BODY MASS INDEX: 33.52 KG/M2

## 2025-02-19 DIAGNOSIS — J06.9 VIRAL UPPER RESPIRATORY TRACT INFECTION: Primary | ICD-10-CM

## 2025-02-19 PROBLEM — K63.5 HYPERPLASTIC POLYP OF LARGE INTESTINE: Status: ACTIVE | Noted: 2025-02-19

## 2025-02-19 PROCEDURE — 99213 OFFICE O/P EST LOW 20 MIN: CPT | Performed by: FAMILY MEDICINE

## 2025-02-19 ASSESSMENT — PAIN SCALES - GENERAL: PAINLEVEL_OUTOF10: 0-NO PAIN

## 2025-02-19 NOTE — PROGRESS NOTES
Subjective   History was provided by the patient.  Sp Morrison is a 60 y.o. male who presents for evaluation of symptoms of a URI. Symptoms include dry cough, nasal blockage, post nasal drip, sinus and nasal congestion, and sore throat. Onset of symptoms was 1 day ago, unchanged since that time. Associated symptoms include sore throat. He is drinking moderate amounts of fluids. Evaluation to date: none. Treatment to date: cough suppressants    Objective   /79 (BP Location: Left arm, Patient Position: Sitting, BP Cuff Size: Large adult)   Pulse 96   Temp 36.5 °C (97.7 °F) (Temporal)   Resp 18   Wt 109 kg (240 lb 4.8 oz)   SpO2 92%   BMI 33.52 kg/m²   Physical Exam  Vitals and nursing note reviewed.   HENT:      Nose: Congestion present.      Mouth/Throat:      Pharynx: Posterior oropharyngeal erythema present.   Cardiovascular:      Rate and Rhythm: Normal rate and regular rhythm.   Pulmonary:      Effort: Pulmonary effort is normal.      Breath sounds: Normal breath sounds.   Musculoskeletal:      Cervical back: Neck supple.   Lymphadenopathy:      Cervical: No cervical adenopathy.   Neurological:      Mental Status: He is alert.          Assessment/Plan   viral upper respiratory illness    Discussed diagnosis and treatment of URI.  Discussed the diagnosis and treatment of sinusitis.  Discussed the importance of avoiding unnecessary antibiotic therapy.  Suggested symptomatic OTC remedies.  Nasal saline spray for congestion.  Follow up as needed.    Excuse to attend Jury duty on Friday.

## 2025-02-19 NOTE — LETTER
Sp Morrison  1965 2/19/2025    To Whom This May Concern:    My patient, Sp Morrison, is unable to perform Jury Duty due to a mental or physical condition that renders the prospective juror unfit for jury service for the remainder of the jury year.    Should you have any questions please do not hesitate to call.    Thank you for your cooperation.    Sincerely,    Rafael Holliday MD

## 2025-02-20 ENCOUNTER — HOSPITAL ENCOUNTER (OUTPATIENT)
Dept: RADIOLOGY | Facility: CLINIC | Age: 60
End: 2025-02-20
Payer: COMMERCIAL

## 2025-02-25 ENCOUNTER — OFFICE VISIT (OUTPATIENT)
Dept: URGENT CARE | Age: 60
End: 2025-02-25
Payer: COMMERCIAL

## 2025-02-25 VITALS
TEMPERATURE: 98.2 F | RESPIRATION RATE: 16 BRPM | SYSTOLIC BLOOD PRESSURE: 118 MMHG | HEART RATE: 80 BPM | OXYGEN SATURATION: 96 % | DIASTOLIC BLOOD PRESSURE: 74 MMHG

## 2025-02-25 DIAGNOSIS — J20.9 ACUTE BRONCHITIS, UNSPECIFIED ORGANISM: Primary | ICD-10-CM

## 2025-02-25 DIAGNOSIS — R05.1 ACUTE COUGH: ICD-10-CM

## 2025-02-25 DIAGNOSIS — Z87.09 HISTORY OF COPD: ICD-10-CM

## 2025-02-25 PROCEDURE — 99213 OFFICE O/P EST LOW 20 MIN: CPT | Performed by: STUDENT IN AN ORGANIZED HEALTH CARE EDUCATION/TRAINING PROGRAM

## 2025-02-25 RX ORDER — PREDNISONE 20 MG/1
20 TABLET ORAL DAILY
Qty: 5 TABLET | Refills: 0 | Status: ON HOLD | OUTPATIENT
Start: 2025-02-25 | End: 2025-03-02

## 2025-02-25 RX ORDER — AZITHROMYCIN 250 MG/1
TABLET, FILM COATED ORAL
Qty: 6 TABLET | Refills: 0 | Status: ON HOLD | OUTPATIENT
Start: 2025-02-25

## 2025-02-25 RX ORDER — ALBUTEROL SULFATE 90 UG/1
2 INHALANT RESPIRATORY (INHALATION) EVERY 6 HOURS PRN
Qty: 18 G | Refills: 0 | Status: ON HOLD | OUTPATIENT
Start: 2025-02-25 | End: 2025-03-22

## 2025-02-25 NOTE — LETTER
February 25, 2025     Patient: Sp Morrison   YOB: 1965   Date of Visit: 2/25/2025       To Whom It May Concern:    It is my medical opinion that Sp Morrison may return to work on 02/28/2025 .    If you have any questions or concerns, please don't hesitate to call.         Sincerely,        Km Piedra DO    CC: No Recipients

## 2025-02-25 NOTE — PROGRESS NOTES
"Subjective   Patient ID: Sp Morrison is a 60 y.o. male. They present today with a chief complaint of bronchitis concern.    History of Present Illness  Sp is a pleasant 60-year-old male who presents to the urgent care for evaluation of cough for at least 1 week duration with yellow productive sputum in the setting of smoking history.  Patient is unsure of history of COPD however states he gets annual cough with yellow production of sputum and this is different for him and is seeking evaluation reassurance.  Patient states \"a Z-Manuel typically knocks this out\".  Patient also notes seeing his primary care provider last week for similar symptoms however was advised supportive treatment measures which she is attempted without significant improvement.  Patient denies fever, chest pain or shortness of breath.  Patient seek evaluation reassurance and treatment options.  No other symptoms or concerns otherwise reported.    Past Medical History  Allergies as of 02/25/2025    (No Known Allergies)       (Not in a hospital admission)       Past Medical History:   Diagnosis Date    Anxiety     Chondromalacia of right patella     Chronic prescription benzodiazepine use     Erectile dysfunction     GERD (gastroesophageal reflux disease)     Glaucoma     H/O esophageal varices     History of left knee replacement     Hypercalcemia     Hyperlipidemia     Hypothyroidism     Insomnia     Liver cirrhosis (Multi)     Rupture of anterior cruciate ligament of left knee, initial encounter     Tear of meniscus of left knee        Past Surgical History:   Procedure Laterality Date    CATARACT EXTRACTION      IRIDOTOMY / IRIDECTOMY      SHOULDER SURGERY          reports that he has been smoking cigarettes. He has a 10 pack-year smoking history. He has been exposed to tobacco smoke. He has never used smokeless tobacco. He reports current alcohol use of about 12.0 standard drinks of alcohol per week. He reports that he does not " currently use drugs.    Review of Systems  A 10-point review of systems was performed, otherwise unremarkable unless stated in the history of present illness.                Objective    Vitals:    02/25/25 0853   BP: 118/74   Pulse: 80   Resp: 16   Temp: 36.8 °C (98.2 °F)   SpO2: 96%     No LMP for male patient.    Gen: Vitals noted and reviewed, no evidence of acute distress, well developed and afebrile.   Psych: Mood and affect appropriate for setting.  Head/Face: Atraumatic and normocephalic.   Neuro: No focal deficits noted.  ENT: TMs clear bilaterally, EACs unremarkable. Mastoids non-tender. Posterior oropharynx without erythema, exudate, or swelling. Uvula is in the midline and non-edematous.   Neck: Supple. No meningismus through full range of motion. No lymphadenopathy.   Cardiac: Regular rate and rhythm no murmur.   Lungs: Clear to auscultation throughout, No evidence of wheezing, rhonchi or crackles. Good aeration throughout.   Extremities: Symmetrical, No peripheral edema  Skin: Without evidence of ecchymosis, wounds, or rashes.      Point of Care Test & Imaging Results from this visit  No results found for this visit on 02/25/25.   No results found.    Diagnostic study results (if any) were reviewed by Km Piedra DO.    Assessment/Plan   Allergies, medications, history, and pertinent labs/EKGs/Imaging reviewed by Km Piedra DO.     Medical Decision Making  Discussed with the patient symptoms and clinical presentation findings may be secondary to an acute bronchitis in the setting of likely underlying COPD or other chronic pulmonary disease unspecified that warrants further evaluation and definitive management by primary care provider.  In the meantime given the patient's duration of symptoms and lack of improvement with supportive treatment measures with change in sputum production we agreed to initiate antimicrobial coverage and prescribed azithromycin Z-Manuel however recommended holding off a  couple days prior to starting this.  We prescribed prednisone and albuterol to aid in symptom management and recommended starting these first prior to starting any antibiotic. Follow up with PCP. We advised seeking immediate emergency medical attention if symptoms fail to improve, worsen or any concerning symptoms arise. Patient voiced full understanding and agreement to plan.    We discussed with the patient our clinical thoughts at this time given the above findings and clinical assessment and we had a shared decision-making conversation in a patient-centered decision-making model on how to proceed forward. The patient was instructed on the importance of a close follow-up with PCP and other care providers. The patient was also advised that an Urgent care diagnosis is often a preliminary impression and that definitive care is often not able to be given completley in the Urgent care setting.      Orders and Diagnoses  Diagnoses and all orders for this visit:  Acute bronchitis, unspecified organism  -     albuterol 90 mcg/actuation inhaler; Inhale 2 puffs every 6 hours if needed for wheezing (Cough) for up to 25 days.  -     predniSONE (Deltasone) 20 mg tablet; Take 1 tablet (20 mg) by mouth once daily for 5 days.  -     azithromycin (Zithromax) 250 mg tablet; Take 2 tabs (500 mg) by mouth today, then take 1 tab daily for 4 days.  Acute cough  History of COPD      Medical Admin Record      Patient disposition: Home    Electronically signed by Km Piedra DO  9:05 AM

## 2025-02-28 ENCOUNTER — HOSPITAL ENCOUNTER (INPATIENT)
Facility: HOSPITAL | Age: 60
LOS: 7 days | Discharge: NURSING, REHAB OR LONG-TERM CARE | End: 2025-03-08
Attending: STUDENT IN AN ORGANIZED HEALTH CARE EDUCATION/TRAINING PROGRAM | Admitting: INTERNAL MEDICINE
Payer: COMMERCIAL

## 2025-02-28 ENCOUNTER — APPOINTMENT (OUTPATIENT)
Dept: CARDIOLOGY | Facility: HOSPITAL | Age: 60
End: 2025-02-28
Payer: COMMERCIAL

## 2025-02-28 ENCOUNTER — APPOINTMENT (OUTPATIENT)
Dept: RADIOLOGY | Facility: HOSPITAL | Age: 60
End: 2025-02-28
Payer: COMMERCIAL

## 2025-02-28 DIAGNOSIS — K59.00 CONSTIPATION, UNSPECIFIED CONSTIPATION TYPE: ICD-10-CM
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
## 2025-02-28 DIAGNOSIS — S82.142A CLOSED FRACTURE OF LEFT TIBIAL PLATEAU, INITIAL ENCOUNTER: ICD-10-CM

## 2025-02-28 DIAGNOSIS — E21.3 HYPERPARATHYROIDISM (MULTI): ICD-10-CM

## 2025-02-28 DIAGNOSIS — Z78.9 ALCOHOL USE: ICD-10-CM

## 2025-02-28 PROBLEM — R79.89 ELEVATED LACTIC ACID LEVEL: Status: ACTIVE | Noted: 2025-02-28

## 2025-02-28 PROBLEM — M25.00: Status: ACTIVE | Noted: 2025-02-28

## 2025-02-28 LAB
ABO GROUP (TYPE) IN BLOOD: NORMAL
ALBUMIN SERPL BCP-MCNC: 4.8 G/DL (ref 3.4–5)
ALP SERPL-CCNC: 49 U/L (ref 33–136)
ALT SERPL W P-5'-P-CCNC: 33 U/L (ref 10–52)
ANION GAP SERPL CALC-SCNC: 16 MMOL/L (ref 10–20)
ANTIBODY SCREEN: NORMAL
APPEARANCE UR: CLEAR
AST SERPL W P-5'-P-CCNC: 30 U/L (ref 9–39)
BASOPHILS # BLD AUTO: 0.03 X10*3/UL (ref 0–0.1)
BASOPHILS NFR BLD AUTO: 0.3 %
BILIRUB SERPL-MCNC: 0.4 MG/DL (ref 0–1.2)
BILIRUB UR STRIP.AUTO-MCNC: NEGATIVE MG/DL
BUN SERPL-MCNC: 14 MG/DL (ref 6–23)
CA-I BLD-SCNC: 1.44 MMOL/L (ref 1.1–1.33)
CALCIUM SERPL-MCNC: 11.5 MG/DL (ref 8.6–10.3)
CHLORIDE SERPL-SCNC: 105 MMOL/L (ref 98–107)
CHLORIDE UR-SCNC: 195 MMOL/L
CHLORIDE/CREATININE (MMOL/G) IN URINE: 200 MMOL/G CREAT (ref 23–275)
CK SERPL-CCNC: 110 U/L (ref 0–325)
CO2 SERPL-SCNC: 21 MMOL/L (ref 21–32)
COLOR UR: ABNORMAL
CREAT SERPL-MCNC: 0.75 MG/DL (ref 0.5–1.3)
CREAT UR-MCNC: 97.6 MG/DL (ref 20–370)
CREAT UR-MCNC: 97.6 MG/DL (ref 20–370)
EGFRCR SERPLBLD CKD-EPI 2021: >90 ML/MIN/1.73M*2
EOSINOPHIL # BLD AUTO: 0.02 X10*3/UL (ref 0–0.7)
EOSINOPHIL NFR BLD AUTO: 0.2 %
ERYTHROCYTE [DISTWIDTH] IN BLOOD BY AUTOMATED COUNT: 12.9 % (ref 11.5–14.5)
ETHANOL SERPL-MCNC: 120 MG/DL
GLUCOSE SERPL-MCNC: 88 MG/DL (ref 74–99)
GLUCOSE UR STRIP.AUTO-MCNC: NORMAL MG/DL
HCT VFR BLD AUTO: 46.1 % (ref 41–52)
HGB BLD-MCNC: 15.5 G/DL (ref 13.5–17.5)
IMM GRANULOCYTES # BLD AUTO: 0.08 X10*3/UL (ref 0–0.7)
IMM GRANULOCYTES NFR BLD AUTO: 0.7 % (ref 0–0.9)
INR PPP: 1.3 (ref 0.9–1.1)
KETONES UR STRIP.AUTO-MCNC: ABNORMAL MG/DL
LACTATE SERPL-SCNC: 0.8 MMOL/L (ref 0.4–2)
LACTATE SERPL-SCNC: 2.6 MMOL/L (ref 0.4–2)
LACTATE SERPL-SCNC: 2.7 MMOL/L (ref 0.4–2)
LEUKOCYTE ESTERASE UR QL STRIP.AUTO: NEGATIVE
LYMPHOCYTES # BLD AUTO: 2.03 X10*3/UL (ref 1.2–4.8)
LYMPHOCYTES NFR BLD AUTO: 17.1 %
MCH RBC QN AUTO: 31.3 PG (ref 26–34)
MCHC RBC AUTO-ENTMCNC: 33.6 G/DL (ref 32–36)
MCV RBC AUTO: 93 FL (ref 80–100)
MONOCYTES # BLD AUTO: 1.18 X10*3/UL (ref 0.1–1)
MONOCYTES NFR BLD AUTO: 9.9 %
NEUTROPHILS # BLD AUTO: 8.54 X10*3/UL (ref 1.2–7.7)
NEUTROPHILS NFR BLD AUTO: 71.8 %
NITRITE UR QL STRIP.AUTO: NEGATIVE
NRBC BLD-RTO: 0 /100 WBCS (ref 0–0)
PH UR STRIP.AUTO: 6.5 [PH]
PLATELET # BLD AUTO: 344 X10*3/UL (ref 150–450)
POTASSIUM SERPL-SCNC: 3.8 MMOL/L (ref 3.5–5.3)
POTASSIUM UR-SCNC: 60 MMOL/L
POTASSIUM/CREAT UR-RTO: 61 MMOL/G CREAT
PROT SERPL-MCNC: 7.9 G/DL (ref 6.4–8.2)
PROT UR STRIP.AUTO-MCNC: NEGATIVE MG/DL
PROT UR-ACNC: 12 MG/DL (ref 5–25)
PROT/CREAT UR: 0.12 MG/MG CREAT (ref 0–0.17)
PROTHROMBIN TIME: 14 SECONDS (ref 9.8–12.4)
RBC # BLD AUTO: 4.96 X10*6/UL (ref 4.5–5.9)
RBC # UR STRIP.AUTO: NEGATIVE MG/DL
RH FACTOR (ANTIGEN D): NORMAL
SODIUM SERPL-SCNC: 138 MMOL/L (ref 136–145)
SODIUM UR-SCNC: 175 MMOL/L
SODIUM/CREAT UR-RTO: 179 MMOL/G CREAT
SP GR UR STRIP.AUTO: 1.03
TSH SERPL-ACNC: 0.38 MIU/L (ref 0.44–3.98)
UROBILINOGEN UR STRIP.AUTO-MCNC: NORMAL MG/DL
WBC # BLD AUTO: 11.9 X10*3/UL (ref 4.4–11.3)

## 2025-02-28 PROCEDURE — 82436 ASSAY OF URINE CHLORIDE: CPT | Performed by: INTERNAL MEDICINE

## 2025-02-28 PROCEDURE — 36415 COLL VENOUS BLD VENIPUNCTURE: CPT | Performed by: STUDENT IN AN ORGANIZED HEALTH CARE EDUCATION/TRAINING PROGRAM

## 2025-02-28 PROCEDURE — 73700 CT LOWER EXTREMITY W/O DYE: CPT | Mod: LEFT SIDE | Performed by: RADIOLOGY

## 2025-02-28 PROCEDURE — 70450 CT HEAD/BRAIN W/O DYE: CPT

## 2025-02-28 PROCEDURE — 72170 X-RAY EXAM OF PELVIS: CPT | Performed by: RADIOLOGY

## 2025-02-28 PROCEDURE — 83605 ASSAY OF LACTIC ACID: CPT | Performed by: NURSE PRACTITIONER

## 2025-02-28 PROCEDURE — 85025 COMPLETE CBC W/AUTO DIFF WBC: CPT | Performed by: STUDENT IN AN ORGANIZED HEALTH CARE EDUCATION/TRAINING PROGRAM

## 2025-02-28 PROCEDURE — 86901 BLOOD TYPING SEROLOGIC RH(D): CPT | Performed by: STUDENT IN AN ORGANIZED HEALTH CARE EDUCATION/TRAINING PROGRAM

## 2025-02-28 PROCEDURE — 83970 ASSAY OF PARATHORMONE: CPT | Mod: STJLAB | Performed by: INTERNAL MEDICINE

## 2025-02-28 PROCEDURE — 71045 X-RAY EXAM CHEST 1 VIEW: CPT | Performed by: RADIOLOGY

## 2025-02-28 PROCEDURE — 72125 CT NECK SPINE W/O DYE: CPT

## 2025-02-28 PROCEDURE — 82533 TOTAL CORTISOL: CPT | Mod: STJLAB | Performed by: INTERNAL MEDICINE

## 2025-02-28 PROCEDURE — 83874 ASSAY OF MYOGLOBIN: CPT | Mod: STJLAB | Performed by: INTERNAL MEDICINE

## 2025-02-28 PROCEDURE — 99291 CRITICAL CARE FIRST HOUR: CPT | Performed by: STUDENT IN AN ORGANIZED HEALTH CARE EDUCATION/TRAINING PROGRAM

## 2025-02-28 PROCEDURE — 73700 CT LOWER EXTREMITY W/O DYE: CPT | Mod: LT

## 2025-02-28 PROCEDURE — 2500000004 HC RX 250 GENERAL PHARMACY W/ HCPCS (ALT 636 FOR OP/ED): Performed by: STUDENT IN AN ORGANIZED HEALTH CARE EDUCATION/TRAINING PROGRAM

## 2025-02-28 PROCEDURE — 2500000001 HC RX 250 WO HCPCS SELF ADMINISTERED DRUGS (ALT 637 FOR MEDICARE OP): Performed by: NURSE PRACTITIONER

## 2025-02-28 PROCEDURE — 2500000004 HC RX 250 GENERAL PHARMACY W/ HCPCS (ALT 636 FOR OP/ED)

## 2025-02-28 PROCEDURE — 82077 ASSAY SPEC XCP UR&BREATH IA: CPT | Performed by: STUDENT IN AN ORGANIZED HEALTH CARE EDUCATION/TRAINING PROGRAM

## 2025-02-28 PROCEDURE — 71045 X-RAY EXAM CHEST 1 VIEW: CPT

## 2025-02-28 PROCEDURE — 82570 ASSAY OF URINE CREATININE: CPT | Performed by: INTERNAL MEDICINE

## 2025-02-28 PROCEDURE — 82550 ASSAY OF CK (CPK): CPT | Performed by: INTERNAL MEDICINE

## 2025-02-28 PROCEDURE — 85610 PROTHROMBIN TIME: CPT | Performed by: STUDENT IN AN ORGANIZED HEALTH CARE EDUCATION/TRAINING PROGRAM

## 2025-02-28 PROCEDURE — 84155 ASSAY OF PROTEIN SERUM: CPT | Mod: STJLAB | Performed by: INTERNAL MEDICINE

## 2025-02-28 PROCEDURE — 72131 CT LUMBAR SPINE W/O DYE: CPT | Mod: RCN

## 2025-02-28 PROCEDURE — 74177 CT ABD & PELVIS W/CONTRAST: CPT

## 2025-02-28 PROCEDURE — 71260 CT THORAX DX C+: CPT

## 2025-02-28 PROCEDURE — 80053 COMPREHEN METABOLIC PANEL: CPT | Performed by: STUDENT IN AN ORGANIZED HEALTH CARE EDUCATION/TRAINING PROGRAM

## 2025-02-28 PROCEDURE — 82330 ASSAY OF CALCIUM: CPT | Performed by: INTERNAL MEDICINE

## 2025-02-28 PROCEDURE — 2500000004 HC RX 250 GENERAL PHARMACY W/ HCPCS (ALT 636 FOR OP/ED): Performed by: NURSE PRACTITIONER

## 2025-02-28 PROCEDURE — 73564 X-RAY EXAM KNEE 4 OR MORE: CPT | Mod: LT

## 2025-02-28 PROCEDURE — 82397 CHEMILUMINESCENT ASSAY: CPT | Performed by: INTERNAL MEDICINE

## 2025-02-28 PROCEDURE — 83605 ASSAY OF LACTIC ACID: CPT | Performed by: STUDENT IN AN ORGANIZED HEALTH CARE EDUCATION/TRAINING PROGRAM

## 2025-02-28 PROCEDURE — 73564 X-RAY EXAM KNEE 4 OR MORE: CPT | Mod: LEFT SIDE | Performed by: RADIOLOGY

## 2025-02-28 PROCEDURE — 83521 IG LIGHT CHAINS FREE EACH: CPT | Mod: STJLAB | Performed by: INTERNAL MEDICINE

## 2025-02-28 PROCEDURE — 84156 ASSAY OF PROTEIN URINE: CPT | Performed by: INTERNAL MEDICINE

## 2025-02-28 PROCEDURE — 86334 IMMUNOFIX E-PHORESIS SERUM: CPT | Mod: STJLAB | Performed by: INTERNAL MEDICINE

## 2025-02-28 PROCEDURE — 72170 X-RAY EXAM OF PELVIS: CPT

## 2025-02-28 PROCEDURE — G0390 TRAUMA RESPONS W/HOSP CRITI: HCPCS

## 2025-02-28 PROCEDURE — 72128 CT CHEST SPINE W/O DYE: CPT | Mod: RCN

## 2025-02-28 PROCEDURE — 2550000001 HC RX 255 CONTRASTS: Performed by: STUDENT IN AN ORGANIZED HEALTH CARE EDUCATION/TRAINING PROGRAM

## 2025-02-28 PROCEDURE — 70450 CT HEAD/BRAIN W/O DYE: CPT | Performed by: RADIOLOGY

## 2025-02-28 PROCEDURE — 99285 EMERGENCY DEPT VISIT HI MDM: CPT | Mod: 25 | Performed by: STUDENT IN AN ORGANIZED HEALTH CARE EDUCATION/TRAINING PROGRAM

## 2025-02-28 PROCEDURE — 81003 URINALYSIS AUTO W/O SCOPE: CPT | Performed by: INTERNAL MEDICINE

## 2025-02-28 PROCEDURE — 82652 VIT D 1 25-DIHYDROXY: CPT | Performed by: INTERNAL MEDICINE

## 2025-02-28 PROCEDURE — 2500000004 HC RX 250 GENERAL PHARMACY W/ HCPCS (ALT 636 FOR OP/ED): Performed by: INTERNAL MEDICINE

## 2025-02-28 PROCEDURE — 72125 CT NECK SPINE W/O DYE: CPT | Performed by: RADIOLOGY

## 2025-02-28 PROCEDURE — 84443 ASSAY THYROID STIM HORMONE: CPT | Performed by: INTERNAL MEDICINE

## 2025-02-28 PROCEDURE — 2500000001 HC RX 250 WO HCPCS SELF ADMINISTERED DRUGS (ALT 637 FOR MEDICARE OP)

## 2025-02-28 PROCEDURE — 93005 ELECTROCARDIOGRAM TRACING: CPT

## 2025-02-28 RX ORDER — TALC
3 POWDER (GRAM) TOPICAL NIGHTLY PRN
Status: DISCONTINUED | OUTPATIENT
Start: 2025-02-28 | End: 2025-03-08 | Stop reason: HOSPADM

## 2025-02-28 RX ORDER — ACETAMINOPHEN 160 MG/5ML
650 SOLUTION ORAL EVERY 4 HOURS PRN
Status: DISCONTINUED | OUTPATIENT
Start: 2025-02-28 | End: 2025-03-02

## 2025-02-28 RX ORDER — ACETAMINOPHEN 650 MG/1
650 SUPPOSITORY RECTAL EVERY 4 HOURS PRN
Status: DISCONTINUED | OUTPATIENT
Start: 2025-02-28 | End: 2025-03-02

## 2025-02-28 RX ORDER — LORAZEPAM 2 MG/ML
0.5 INJECTION INTRAMUSCULAR EVERY 2 HOUR PRN
Status: DISCONTINUED | OUTPATIENT
Start: 2025-02-28 | End: 2025-03-05

## 2025-02-28 RX ORDER — ONDANSETRON HYDROCHLORIDE 2 MG/ML
4 INJECTION, SOLUTION INTRAVENOUS ONCE
Status: COMPLETED | OUTPATIENT
Start: 2025-02-28 | End: 2025-02-28

## 2025-02-28 RX ORDER — PANTOPRAZOLE SODIUM 40 MG/10ML
40 INJECTION, POWDER, LYOPHILIZED, FOR SOLUTION INTRAVENOUS
Status: DISCONTINUED | OUTPATIENT
Start: 2025-03-01 | End: 2025-03-01

## 2025-02-28 RX ORDER — ALPRAZOLAM 0.5 MG/1
0.5 TABLET ORAL ONCE
Status: COMPLETED | OUTPATIENT
Start: 2025-02-28 | End: 2025-02-28

## 2025-02-28 RX ORDER — METOPROLOL TARTRATE 1 MG/ML
5 INJECTION, SOLUTION INTRAVENOUS EVERY 6 HOURS PRN
Status: DISCONTINUED | OUTPATIENT
Start: 2025-02-28 | End: 2025-03-08 | Stop reason: HOSPADM

## 2025-02-28 RX ORDER — POLYETHYLENE GLYCOL 3350 17 G/17G
17 POWDER, FOR SOLUTION ORAL DAILY PRN
Status: DISCONTINUED | OUTPATIENT
Start: 2025-02-28 | End: 2025-03-05

## 2025-02-28 RX ORDER — ACETAMINOPHEN 325 MG/1
650 TABLET ORAL EVERY 4 HOURS PRN
Status: DISCONTINUED | OUTPATIENT
Start: 2025-02-28 | End: 2025-03-02

## 2025-02-28 RX ORDER — SODIUM CHLORIDE, SODIUM LACTATE, POTASSIUM CHLORIDE, CALCIUM CHLORIDE 600; 310; 30; 20 MG/100ML; MG/100ML; MG/100ML; MG/100ML
100 INJECTION, SOLUTION INTRAVENOUS CONTINUOUS
Status: ACTIVE | OUTPATIENT
Start: 2025-02-28 | End: 2025-03-01

## 2025-02-28 RX ORDER — TRAMADOL HYDROCHLORIDE 50 MG/1
50 TABLET ORAL EVERY 6 HOURS PRN
Status: DISCONTINUED | OUTPATIENT
Start: 2025-02-28 | End: 2025-03-02

## 2025-02-28 RX ORDER — LORAZEPAM 2 MG/ML
1 INJECTION INTRAMUSCULAR EVERY 2 HOUR PRN
Status: DISCONTINUED | OUTPATIENT
Start: 2025-02-28 | End: 2025-03-05

## 2025-02-28 RX ORDER — PANTOPRAZOLE SODIUM 40 MG/1
40 TABLET, DELAYED RELEASE ORAL
Status: DISCONTINUED | OUTPATIENT
Start: 2025-03-01 | End: 2025-03-01

## 2025-02-28 RX ORDER — ONDANSETRON HYDROCHLORIDE 2 MG/ML
4 INJECTION, SOLUTION INTRAVENOUS EVERY 6 HOURS PRN
Status: DISCONTINUED | OUTPATIENT
Start: 2025-02-28 | End: 2025-03-08 | Stop reason: HOSPADM

## 2025-02-28 RX ORDER — FENTANYL CITRATE 50 UG/ML
50 INJECTION, SOLUTION INTRAMUSCULAR; INTRAVENOUS ONCE
Status: COMPLETED | OUTPATIENT
Start: 2025-02-28 | End: 2025-02-28

## 2025-02-28 RX ORDER — FENTANYL CITRATE 50 UG/ML
INJECTION, SOLUTION INTRAMUSCULAR; INTRAVENOUS
Status: COMPLETED
Start: 2025-02-28 | End: 2025-02-28

## 2025-02-28 RX ORDER — MORPHINE SULFATE 4 MG/ML
4 INJECTION, SOLUTION INTRAMUSCULAR; INTRAVENOUS ONCE
Status: COMPLETED | OUTPATIENT
Start: 2025-02-28 | End: 2025-02-28

## 2025-02-28 RX ORDER — FOLIC ACID 1 MG/1
1 TABLET ORAL DAILY
Status: DISCONTINUED | OUTPATIENT
Start: 2025-02-28 | End: 2025-03-08 | Stop reason: HOSPADM

## 2025-02-28 RX ORDER — LORAZEPAM 2 MG/ML
2 INJECTION INTRAMUSCULAR EVERY 2 HOUR PRN
Status: DISCONTINUED | OUTPATIENT
Start: 2025-02-28 | End: 2025-03-05

## 2025-02-28 RX ORDER — MULTIVIT-MIN/IRON FUM/FOLIC AC 7.5 MG-4
1 TABLET ORAL DAILY
Status: DISCONTINUED | OUTPATIENT
Start: 2025-02-28 | End: 2025-03-08 | Stop reason: HOSPADM

## 2025-02-28 RX ORDER — ONDANSETRON HYDROCHLORIDE 2 MG/ML
INJECTION, SOLUTION INTRAVENOUS
Status: COMPLETED
Start: 2025-02-28 | End: 2025-02-28

## 2025-02-28 RX ORDER — LANOLIN ALCOHOL/MO/W.PET/CERES
100 CREAM (GRAM) TOPICAL DAILY
Status: DISCONTINUED | OUTPATIENT
Start: 2025-02-28 | End: 2025-03-08 | Stop reason: HOSPADM

## 2025-02-28 RX ORDER — MORPHINE SULFATE 2 MG/ML
2 INJECTION, SOLUTION INTRAMUSCULAR; INTRAVENOUS EVERY 4 HOURS PRN
Status: DISCONTINUED | OUTPATIENT
Start: 2025-02-28 | End: 2025-03-02

## 2025-02-28 RX ORDER — HEPARIN SODIUM 5000 [USP'U]/ML
5000 INJECTION, SOLUTION INTRAVENOUS; SUBCUTANEOUS EVERY 8 HOURS
Status: DISCONTINUED | OUTPATIENT
Start: 2025-02-28 | End: 2025-03-05

## 2025-02-28 RX ADMIN — MORPHINE SULFATE 4 MG: 4 INJECTION, SOLUTION INTRAMUSCULAR; INTRAVENOUS at 07:24

## 2025-02-28 RX ADMIN — ALPRAZOLAM 0.5 MG: 0.5 TABLET ORAL at 09:51

## 2025-02-28 RX ADMIN — SODIUM CHLORIDE, POTASSIUM CHLORIDE, SODIUM LACTATE AND CALCIUM CHLORIDE 100 ML/HR: 600; 310; 30; 20 INJECTION, SOLUTION INTRAVENOUS at 20:29

## 2025-02-28 RX ADMIN — MORPHINE SULFATE 2 MG: 2 INJECTION, SOLUTION INTRAMUSCULAR; INTRAVENOUS at 18:15

## 2025-02-28 RX ADMIN — METOPROLOL TARTRATE 5 MG: 5 INJECTION INTRAVENOUS at 15:42

## 2025-02-28 RX ADMIN — ACETAMINOPHEN 650 MG: 325 TABLET ORAL at 21:35

## 2025-02-28 RX ADMIN — Medication 1 TABLET: at 11:54

## 2025-02-28 RX ADMIN — ONDANSETRON 4 MG: 2 INJECTION INTRAMUSCULAR; INTRAVENOUS at 07:47

## 2025-02-28 RX ADMIN — MORPHINE SULFATE 2 MG: 2 INJECTION, SOLUTION INTRAMUSCULAR; INTRAVENOUS at 11:09

## 2025-02-28 RX ADMIN — IOHEXOL 90 ML: 350 INJECTION, SOLUTION INTRAVENOUS at 04:34

## 2025-02-28 RX ADMIN — TRAMADOL HYDROCHLORIDE 50 MG: 50 TABLET, FILM COATED ORAL at 21:35

## 2025-02-28 RX ADMIN — FOLIC ACID 1 MG: 1 TABLET ORAL at 11:54

## 2025-02-28 RX ADMIN — THIAMINE HCL TAB 100 MG 100 MG: 100 TAB at 11:54

## 2025-02-28 RX ADMIN — SODIUM CHLORIDE, POTASSIUM CHLORIDE, SODIUM LACTATE AND CALCIUM CHLORIDE 1000 ML: 600; 310; 30; 20 INJECTION, SOLUTION INTRAVENOUS at 04:58

## 2025-02-28 RX ADMIN — ONDANSETRON 4 MG: 2 INJECTION INTRAMUSCULAR; INTRAVENOUS at 11:10

## 2025-02-28 RX ADMIN — ONDANSETRON 4 MG: 2 INJECTION INTRAMUSCULAR; INTRAVENOUS at 18:15

## 2025-02-28 RX ADMIN — SODIUM CHLORIDE, POTASSIUM CHLORIDE, SODIUM LACTATE AND CALCIUM CHLORIDE 100 ML/HR: 600; 310; 30; 20 INJECTION, SOLUTION INTRAVENOUS at 09:51

## 2025-02-28 RX ADMIN — SODIUM CHLORIDE, POTASSIUM CHLORIDE, SODIUM LACTATE AND CALCIUM CHLORIDE 1000 ML: 600; 310; 30; 20 INJECTION, SOLUTION INTRAVENOUS at 07:22

## 2025-02-28 RX ADMIN — FENTANYL CITRATE 50 MCG: 50 INJECTION, SOLUTION INTRAMUSCULAR; INTRAVENOUS at 04:58

## 2025-02-28 RX ADMIN — ONDANSETRON HYDROCHLORIDE 4 MG: 2 INJECTION, SOLUTION INTRAVENOUS at 11:10

## 2025-02-28 ASSESSMENT — LIFESTYLE VARIABLES
AGITATION: NORMAL ACTIVITY
VISUAL DISTURBANCES: NOT PRESENT
AUDITORY DISTURBANCES: NOT PRESENT
VISUAL DISTURBANCES: NOT PRESENT
TOTAL SCORE: 0
ANXIETY: NO ANXIETY, AT EASE
TREMOR: NO TREMOR
ORIENTATION AND CLOUDING OF SENSORIUM: ORIENTED AND CAN DO SERIAL ADDITIONS
HEADACHE, FULLNESS IN HEAD: NOT PRESENT
TOTAL SCORE: 0
AUDITORY DISTURBANCES: NOT PRESENT
ANXIETY: NO ANXIETY, AT EASE
TREMOR: MODERATE, WITH PATIENT'S ARMS EXTENDED
TREMOR: NO TREMOR
HAVE PEOPLE ANNOYED YOU BY CRITICIZING YOUR DRINKING: NO
TOTAL SCORE: 0
NAUSEA AND VOMITING: NO NAUSEA AND NO VOMITING
AUDITORY DISTURBANCES: NOT PRESENT
TREMOR: NO TREMOR
NAUSEA AND VOMITING: NO NAUSEA AND NO VOMITING
AGITATION: NORMAL ACTIVITY
AGITATION: NORMAL ACTIVITY
NAUSEA AND VOMITING: NO NAUSEA AND NO VOMITING
TOTAL SCORE: 9
VISUAL DISTURBANCES: NOT PRESENT
ORIENTATION AND CLOUDING OF SENSORIUM: ORIENTED AND CAN DO SERIAL ADDITIONS
PAROXYSMAL SWEATS: NO SWEAT VISIBLE
TOTAL SCORE: 0
NAUSEA AND VOMITING: NO NAUSEA AND NO VOMITING
AGITATION: NORMAL ACTIVITY
HAVE YOU EVER FELT YOU SHOULD CUT DOWN ON YOUR DRINKING: NO
VISUAL DISTURBANCES: NOT PRESENT
PULSE: 95
ANXIETY: 5
PAROXYSMAL SWEATS: NO SWEAT VISIBLE
VISUAL DISTURBANCES: NOT PRESENT
NAUSEA AND VOMITING: NO NAUSEA AND NO VOMITING
VISUAL DISTURBANCES: NOT PRESENT
HEADACHE, FULLNESS IN HEAD: NOT PRESENT
HEADACHE, FULLNESS IN HEAD: NOT PRESENT
TOTAL SCORE: 0
PAROXYSMAL SWEATS: NO SWEAT VISIBLE
TOTAL SCORE: 0
AUDITORY DISTURBANCES: NOT PRESENT
ORIENTATION AND CLOUDING OF SENSORIUM: ORIENTED AND CAN DO SERIAL ADDITIONS
AUDITORY DISTURBANCES: NOT PRESENT
AUDITORY DISTURBANCES: NOT PRESENT
BLOOD PRESSURE: 166/91
HEADACHE, FULLNESS IN HEAD: NOT PRESENT
AGITATION: NORMAL ACTIVITY
ORIENTATION AND CLOUDING OF SENSORIUM: ORIENTED AND CAN DO SERIAL ADDITIONS
PAROXYSMAL SWEATS: 2
EVER HAD A DRINK FIRST THING IN THE MORNING TO STEADY YOUR NERVES TO GET RID OF A HANGOVER: NO
ORIENTATION AND CLOUDING OF SENSORIUM: ORIENTED AND CAN DO SERIAL ADDITIONS
NAUSEA AND VOMITING: NO NAUSEA AND NO VOMITING
AUDITORY DISTURBANCES: NOT PRESENT
EVER FELT BAD OR GUILTY ABOUT YOUR DRINKING: NO
ANXIETY: NO ANXIETY, AT EASE
TREMOR: NO TREMOR
AGITATION: NORMAL ACTIVITY
PAROXYSMAL SWEATS: NO SWEAT VISIBLE
HEADACHE, FULLNESS IN HEAD: NOT PRESENT
PULSE: 98
VISUAL DISTURBANCES: NOT PRESENT
ANXIETY: 5
AGITATION: NORMAL ACTIVITY
ANXIETY: NO ANXIETY, AT EASE
PAROXYSMAL SWEATS: NO SWEAT VISIBLE
VISUAL DISTURBANCES: NOT PRESENT
BLOOD PRESSURE: 177/89
TOTAL SCORE: 0
TREMOR: NO TREMOR
HEADACHE, FULLNESS IN HEAD: NOT PRESENT
PAROXYSMAL SWEATS: NO SWEAT VISIBLE
TREMOR: 2
TREMOR: NO TREMOR
NAUSEA AND VOMITING: NO NAUSEA AND NO VOMITING
ORIENTATION AND CLOUDING OF SENSORIUM: ORIENTED AND CAN DO SERIAL ADDITIONS
AUDITORY DISTURBANCES: NOT PRESENT
NAUSEA AND VOMITING: NO NAUSEA AND NO VOMITING
HEADACHE, FULLNESS IN HEAD: NOT PRESENT
PAROXYSMAL SWEATS: 2
ORIENTATION AND CLOUDING OF SENSORIUM: ORIENTED AND CAN DO SERIAL ADDITIONS

## 2025-02-28 ASSESSMENT — PAIN - FUNCTIONAL ASSESSMENT
PAIN_FUNCTIONAL_ASSESSMENT: 0-10

## 2025-02-28 ASSESSMENT — PAIN SCALES - GENERAL
PAINLEVEL_OUTOF10: 6
PAINLEVEL_OUTOF10: 8
PAINLEVEL_OUTOF10: 9
PAINLEVEL_OUTOF10: 10 - WORST POSSIBLE PAIN
PAINLEVEL_OUTOF10: 0 - NO PAIN
PAINLEVEL_OUTOF10: 7
PAINLEVEL_OUTOF10: 8
PAINLEVEL_OUTOF10: 10 - WORST POSSIBLE PAIN
PAINLEVEL_OUTOF10: 6
PAINLEVEL_OUTOF10: 10 - WORST POSSIBLE PAIN
PAINLEVEL_OUTOF10: 7
PAINLEVEL_OUTOF10: 3

## 2025-02-28 ASSESSMENT — PAIN DESCRIPTION - LOCATION
LOCATION: LEG
LOCATION: LEG
LOCATION: KNEE

## 2025-02-28 ASSESSMENT — COLUMBIA-SUICIDE SEVERITY RATING SCALE - C-SSRS
1. IN THE PAST MONTH, HAVE YOU WISHED YOU WERE DEAD OR WISHED YOU COULD GO TO SLEEP AND NOT WAKE UP?: NO
6. HAVE YOU EVER DONE ANYTHING, STARTED TO DO ANYTHING, OR PREPARED TO DO ANYTHING TO END YOUR LIFE?: NO
2. HAVE YOU ACTUALLY HAD ANY THOUGHTS OF KILLING YOURSELF?: NO

## 2025-02-28 ASSESSMENT — PAIN DESCRIPTION - ONSET: ONSET: ONGOING

## 2025-02-28 ASSESSMENT — PAIN DESCRIPTION - ORIENTATION
ORIENTATION: LEFT

## 2025-02-28 ASSESSMENT — PAIN DESCRIPTION - PROGRESSION
CLINICAL_PROGRESSION: NOT CHANGED
CLINICAL_PROGRESSION: NOT CHANGED

## 2025-02-28 ASSESSMENT — PAIN DESCRIPTION - DESCRIPTORS: DESCRIPTORS: THROBBING

## 2025-02-28 ASSESSMENT — PAIN DESCRIPTION - FREQUENCY: FREQUENCY: CONSTANT/CONTINUOUS

## 2025-02-28 ASSESSMENT — PAIN DESCRIPTION - PAIN TYPE
TYPE: ACUTE PAIN
TYPE: ACUTE PAIN

## 2025-02-28 NOTE — CONSULTS
Reason For Consult  Left knee pain    History Of Present Illness  Sp Morrison is a 60 y.o. male presenting with left knee pain.  He reportedly fell on steps last night when he was intoxicated.  He denies loss of consciousness but reportedly did hit his head.  [place] EMS [place] [facility] [place] were obtained demonstrating a lateral tibial plateau fracture.  Orthopedics was consulted.  The patient was subsequently admitted to the hospital.  He has undergone previous surgery on this knee with an ACL reconstruction and some form of meniscal surgery back in 2014.  He endorses pain in the left knee but denies pain elsewhere.  His family including his siblings are at bedside.  He denies any numbness or tingling.     Past Medical History  He has a past medical history of Acquired hypothyroidism (10/09/2015), Anxiety, Chondromalacia of right patella, Chronic prescription benzodiazepine use, Erectile dysfunction, Esophageal varices without bleeding, unspecified esophageal varices type (Multi) (05/23/2024), GERD (gastroesophageal reflux disease), GERD without esophagitis (12/12/2016), Glaucoma, H/O esophageal varices, History of left knee replacement, Hypercalcemia, Hyperlipidemia, Hyperparathyroidism (Multi) (05/23/2024), Hypothyroidism, Insomnia, Left anterior cruciate ligament tear (02/12/2025), Liver cirrhosis (Multi), Recurrent genital herpes (10/25/2013), Rupture of anterior cruciate ligament of left knee, initial encounter, Tear of meniscus of left knee, and Vitamin D deficiency (01/07/2016).    Surgical History  He has a past surgical history that includes Shoulder surgery; Cataract extraction; and Iridotomy / iridectomy.     Social History  He reports that he has been smoking cigarettes. He has a 10 pack-year smoking history. He has been exposed to tobacco smoke. He has never used smokeless tobacco. He reports current alcohol use of about 12.0 standard drinks of alcohol per week. He  "reports that he does not currently use drugs.    Family History  Family History   Problem Relation Name Age of Onset    Alcohol abuse Mother Mother     Depression Mother Mother     Stroke Mother Mother     No Known Problems Father      No Known Problems Sister      No Known Problems Brother      No Known Problems Daughter      No Known Problems Son      No Known Problems Mother's Sister      No Known Problems Mother's Brother      No Known Problems Father's Sister      No Known Problems Father's Brother      No Known Problems Maternal Grandmother      No Known Problems Maternal Grandfather      No Known Problems Paternal Grandmother      No Known Problems Paternal Grandfather      No Known Problems Other          Allergies  Patient has no known allergies.     Physical Exam  Focused examination of the left lower extremity reveals tenderness to palpation about the left lateral tibial plateau.  There is no tenderness to palpation medially.  Range of motion testing was deferred given the patient's injury.  He knee immobilizer is in place.  The skin is intact and there is no significant ecchymosis or bruising present.  The compartments in the leg are soft and compressible.  He is able to plantarflex and dorsiflex the ankle.  There is no pain with logroll.  Sensation is intact to light touch in the left lower extremity although subjectively diminished on the dorsal aspect of his foot secondary to a prior injury.  DP and PT pulse 2+.    The remainder of the secondary exam is negative for any bony tenderness or limited range of motion of the remainder of the extremities.     Last Recorded Vitals  Blood pressure (!) 180/95, pulse (!) 102, temperature 37.2 °C (99 °F), temperature source Temporal, resp. rate (!) 26, height 1.803 m (5' 11\"), weight 109 kg (240 lb), SpO2 94%.    Relevant Results      Scheduled medications  folic acid, 1 mg, oral, Daily  [Held by provider] heparin (porcine), 5,000 Units, subcutaneous, " q8h  multivitamin with minerals, 1 tablet, oral, Daily  [START ON 3/1/2025] pantoprazole, 40 mg, oral, Daily before breakfast   Or  [START ON 3/1/2025] pantoprazole, 40 mg, intravenous, Daily before breakfast  thiamine, 100 mg, oral, Daily      Continuous medications  lactated Ringer's, 100 mL/hr, Last Rate: 100 mL/hr (02/28/25 0951)      PRN medications  PRN medications: acetaminophen **OR** acetaminophen **OR** acetaminophen, LORazepam **OR** LORazepam **OR** LORazepam, melatonin, metoprolol, morphine, polyethylene glycol, traMADol  Results for orders placed or performed during the hospital encounter of 02/28/25 (from the past 24 hours)   Electrocardiogram, 12-lead   Result Value Ref Range    Ventricular Rate 111 BPM    Atrial Rate 111 BPM    NH Interval 152 ms    QRS Duration 94 ms    QT Interval 318 ms    QTC Calculation(Bazett) 432 ms    P Axis 74 degrees    R Axis 63 degrees    T Axis 64 degrees    QRS Count 18 beats    Q Onset 214 ms    P Onset 138 ms    P Offset 198 ms    T Offset 373 ms    QTC Fredericia 390 ms   CBC and Auto Differential   Result Value Ref Range    WBC 11.9 (H) 4.4 - 11.3 x10*3/uL    nRBC 0.0 0.0 - 0.0 /100 WBCs    RBC 4.96 4.50 - 5.90 x10*6/uL    Hemoglobin 15.5 13.5 - 17.5 g/dL    Hematocrit 46.1 41.0 - 52.0 %    MCV 93 80 - 100 fL    MCH 31.3 26.0 - 34.0 pg    MCHC 33.6 32.0 - 36.0 g/dL    RDW 12.9 11.5 - 14.5 %    Platelets 344 150 - 450 x10*3/uL    Neutrophils % 71.8 40.0 - 80.0 %    Immature Granulocytes %, Automated 0.7 0.0 - 0.9 %    Lymphocytes % 17.1 13.0 - 44.0 %    Monocytes % 9.9 2.0 - 10.0 %    Eosinophils % 0.2 0.0 - 6.0 %    Basophils % 0.3 0.0 - 2.0 %    Neutrophils Absolute 8.54 (H) 1.20 - 7.70 x10*3/uL    Immature Granulocytes Absolute, Automated 0.08 0.00 - 0.70 x10*3/uL    Lymphocytes Absolute 2.03 1.20 - 4.80 x10*3/uL    Monocytes Absolute 1.18 (H) 0.10 - 1.00 x10*3/uL    Eosinophils Absolute 0.02 0.00 - 0.70 x10*3/uL    Basophils Absolute 0.03 0.00 - 0.10 x10*3/uL    Comprehensive Metabolic Panel   Result Value Ref Range    Glucose 88 74 - 99 mg/dL    Sodium 138 136 - 145 mmol/L    Potassium 3.8 3.5 - 5.3 mmol/L    Chloride 105 98 - 107 mmol/L    Bicarbonate 21 21 - 32 mmol/L    Anion Gap 16 10 - 20 mmol/L    Urea Nitrogen 14 6 - 23 mg/dL    Creatinine 0.75 0.50 - 1.30 mg/dL    eGFR >90 >60 mL/min/1.73m*2    Calcium 11.5 (H) 8.6 - 10.3 mg/dL    Albumin 4.8 3.4 - 5.0 g/dL    Alkaline Phosphatase 49 33 - 136 U/L    Total Protein 7.9 6.4 - 8.2 g/dL    AST 30 9 - 39 U/L    Bilirubin, Total 0.4 0.0 - 1.2 mg/dL    ALT 33 10 - 52 U/L   Alcohol   Result Value Ref Range    Alcohol 120 (H) <=10 mg/dL   Lactate   Result Value Ref Range    Lactate 2.6 (H) 0.4 - 2.0 mmol/L   Protime-INR   Result Value Ref Range    Protime 14.0 (H) 9.8 - 12.4 seconds    INR 1.3 (H) 0.9 - 1.1   Type And Screen   Result Value Ref Range    ABO TYPE A     Rh TYPE NEG     ANTIBODY SCREEN NEG    Lactate   Result Value Ref Range    Lactate 2.7 (H) 0.4 - 2.0 mmol/L        Assessment/Plan     Left lateral tibial plateau fracture    I had a long discussion with the patient and his family at bedside.  We reviewed his x-rays and discussed that this is an operative fracture.  He has currently been placed in a knee immobilizer.  He is admitted to a medicine service.  At this point I discussed with them that this fracture can be managed in an outpatient setting.  His case has been discussed with Dr. Whitt who has agreed to manage this fracture.  We will arrange for close outpatient follow-up with Dr. Whitt for surgical fixation.  In the interim the patient will remain in his knee immobilizer and will remain nonweightbearing to the left lower extremity.  He and his family understand and agree with this plan.  He is okay to discharge from an Ortho perspective whenever he is medically clear and his pain is adequately managed on an oral regimen.  Please contact me with questions.      Osmar Kirby MD

## 2025-02-28 NOTE — ED NOTES
Crutches and knee immobilizer at bedside. Pt called siblings to see if anyone can help him at home. Unable to get in contact. Awaiting call back     Gemma Ying RN  02/28/25 [id number]    
Left knee immobilizer applied. Neurovascular check WNL     Gemma Ying, LUIS E  02/28/25 0939    
Sp02 noted to decrease to 92-93% MD aware. No signs of resp distress     Gemma Ying RN  02/28/25 0868    
[facility] .  Internal Medicine  [address] [place] [address]  Phone: [phone]  Fax: [phone]  Follow Up Time: 1-3 Days    Noel Avila  Cardiovascular Disease  [address] [facility] [address], [address]  [place] [address]  Phone: [phone]  Fax: [phone]  Follow Up Time: Routine

## 2025-02-28 NOTE — H&P
History Of Present Illness  Sp Morrison is a 60 y.o. male   PMH; esophageal varices, hypothyroidism, HLP, liver cirrhosis.   Patient is presenting with left knee pain.  He reportedly fell on steps last night when he was intoxicated.  He denies loss of consciousness but reportedly did hit his head.  He was brought by EMS to [facility] [place] where x-rays were obtained demonstrating a lateral tibial plateau fracture.  Orthopedics was consulted.  The patient was subsequently admitted to the hospital.  He has undergone previous surgery on this knee with an ACL reconstruction and some form of meniscal surgery back in 2014.  He endorses pain in the left knee but denies pain elsewhere.  His family including his siblings are at bedside.  He denies any numbness or tingling.          Past Medical History  He has a past medical history of Acquired hypothyroidism (10/09/2015), Anxiety, Chondromalacia of right patella, Chronic prescription benzodiazepine use, Erectile dysfunction, Esophageal varices without bleeding, unspecified esophageal varices type (Multi) (05/23/2024), GERD (gastroesophageal reflux disease), GERD without esophagitis (12/12/2016), Glaucoma, H/O esophageal varices, History of left knee replacement, Hypercalcemia, Hyperlipidemia, Hyperparathyroidism (Multi) (05/23/2024), Hypothyroidism, Insomnia, Left anterior cruciate ligament tear (02/12/2025), Liver cirrhosis (Multi), Recurrent genital herpes (10/25/2013), Rupture of anterior cruciate ligament of left knee, initial encounter, Tear of meniscus of left knee, and Vitamin D deficiency (01/07/2016).    Surgical History  He has a past surgical history that includes Shoulder surgery; Cataract extraction; and Iridotomy / iridectomy.     Social History  He reports that he has been smoking cigarettes. He has a 10 pack-year smoking history. He has been exposed to tobacco smoke. He has never used smokeless tobacco. He reports current alcohol use of about  12.0 standard drinks of alcohol per week. He reports that he does not currently use drugs.    Family History  Family History   Problem Relation Name Age of Onset    Alcohol abuse Mother Mother     Depression Mother Mother     Stroke Mother Mother     No Known Problems Father      No Known Problems Sister      No Known Problems Brother      No Known Problems Daughter      No Known Problems Son      No Known Problems Mother's Sister      No Known Problems Mother's Brother      No Known Problems Father's Sister      No Known Problems Father's Brother      No Known Problems Maternal Grandmother      No Known Problems Maternal Grandfather      No Known Problems Paternal Grandmother      No Known Problems Paternal Grandfather      No Known Problems Other          Allergies  Patient has no known allergies.  Scheduled medications  folic acid, 1 mg, oral, Daily  [Held by provider] heparin (porcine), 5,000 Units, subcutaneous, q8h  multivitamin with minerals, 1 tablet, oral, Daily  [START ON 3/1/2025] pantoprazole, 40 mg, oral, Daily before breakfast   Or  [START ON 3/1/2025] pantoprazole, 40 mg, intravenous, Daily before breakfast  thiamine, 100 mg, oral, Daily      Continuous medications  lactated Ringer's, 100 mL/hr, Last Rate: 100 mL/hr (02/28/25 0951)      PRN medications  PRN medications: acetaminophen **OR** acetaminophen **OR** acetaminophen, LORazepam **OR** LORazepam **OR** LORazepam, melatonin, metoprolol, morphine, ondansetron, polyethylene glycol, traMADol    Review of Systems  12 systems were reviewed and pertinent findings were addressed in HPI     Physical Exam  GENERAL: No distress.   SKIN: No rashes or lesions.  EYES: PERRLA, EOMI  HEENT: Head: Normocephalic  Neck: supple and no adenopathy  LUNGS: Lungs clear to auscultation. Good diaphragmatic excursion.  CARDIAC: Normal S1 and S2; no rubs, murmurs, or gallops  ABDOMEN: Abdomen soft, non-tender. BS normal.   EXTREMITIES: No ulcers, Extremities normal.  "  NEURO: No focal deficit.      Last Recorded Vitals  Blood pressure (!) 165/98, pulse 96, temperature 36.8 °C (98.2 °F), temperature source Temporal, resp. rate 20, height 1.803 m (5' 11\"), weight 109 kg (240 lb), SpO2 94%.    Relevant Results        Results for orders placed or performed during the hospital encounter of 02/28/25 (from the past 24 hours)   Electrocardiogram, 12-lead   Result Value Ref Range    Ventricular Rate 111 BPM    Atrial Rate 111 BPM    ND Interval 152 ms    QRS Duration 94 ms    QT Interval 318 ms    QTC Calculation(Bazett) 432 ms    P Axis 74 degrees    R Axis 63 degrees    T Axis 64 degrees    QRS Count 18 beats    Q Onset 214 ms    P Onset 138 ms    P Offset 198 ms    T Offset 373 ms    QTC Fredericia 390 ms   CBC and Auto Differential   Result Value Ref Range    WBC 11.9 (H) 4.4 - 11.3 x10*3/uL    nRBC 0.0 0.0 - 0.0 /100 WBCs    RBC 4.96 4.50 - 5.90 x10*6/uL    Hemoglobin 15.5 13.5 - 17.5 g/dL    Hematocrit 46.1 41.0 - 52.0 %    MCV 93 80 - 100 fL    MCH 31.3 26.0 - 34.0 pg    MCHC 33.6 32.0 - 36.0 g/dL    RDW 12.9 11.5 - 14.5 %    Platelets 344 150 - 450 x10*3/uL    Neutrophils % 71.8 40.0 - 80.0 %    Immature Granulocytes %, Automated 0.7 0.0 - 0.9 %    Lymphocytes % 17.1 13.0 - 44.0 %    Monocytes % 9.9 2.0 - 10.0 %    Eosinophils % 0.2 0.0 - 6.0 %    Basophils % 0.3 0.0 - 2.0 %    Neutrophils Absolute 8.54 (H) 1.20 - 7.70 x10*3/uL    Immature Granulocytes Absolute, Automated 0.08 0.00 - 0.70 x10*3/uL    Lymphocytes Absolute 2.03 1.20 - 4.80 x10*3/uL    Monocytes Absolute 1.18 (H) 0.10 - 1.00 x10*3/uL    Eosinophils Absolute 0.02 0.00 - 0.70 x10*3/uL    Basophils Absolute 0.03 0.00 - 0.10 x10*3/uL   Comprehensive Metabolic Panel   Result Value Ref Range    Glucose 88 74 - 99 mg/dL    Sodium 138 136 - 145 mmol/L    Potassium 3.8 3.5 - 5.3 mmol/L    Chloride 105 98 - 107 mmol/L    Bicarbonate 21 21 - 32 mmol/L    Anion Gap 16 10 - 20 mmol/L    Urea Nitrogen 14 6 - 23 mg/dL    " Creatinine 0.75 0.50 - 1.30 mg/dL    eGFR >90 >60 mL/min/1.73m*2    Calcium 11.5 (H) 8.6 - 10.3 mg/dL    Albumin 4.8 3.4 - 5.0 g/dL    Alkaline Phosphatase 49 33 - 136 U/L    Total Protein 7.9 6.4 - 8.2 g/dL    AST 30 9 - 39 U/L    Bilirubin, Total 0.4 0.0 - 1.2 mg/dL    ALT 33 10 - 52 U/L   Alcohol   Result Value Ref Range    Alcohol 120 (H) <=10 mg/dL   Lactate   Result Value Ref Range    Lactate 2.6 (H) 0.4 - 2.0 mmol/L   Protime-INR   Result Value Ref Range    Protime 14.0 (H) 9.8 - 12.4 seconds    INR 1.3 (H) 0.9 - 1.1   Type And Screen   Result Value Ref Range    ABO TYPE A     Rh TYPE NEG     ANTIBODY SCREEN NEG    Lactate   Result Value Ref Range    Lactate 2.7 (H) 0.4 - 2.0 mmol/L          Assessment/Plan   Assessment & Plan  Fall, initial encounter    Hypercalcemia    Fall    Elevated lactic acid level    Tibial plateau fracture, left    Lipohemarthrosis      // Left lateral tibial plateau fracture  Ortho is on consult.   PT/OT       // Hypercalcemia  - Albumin;   - Ionized Ca;   - PTH;   - 25D;   - 1,25D;   - Phos;   - Check K/L  - Check SPEP with IF  - IVF;         Kelvin Cuellar MD

## 2025-02-28 NOTE — ED PROVIDER NOTES
Emergency Department Provider Note        History of Present Illness     History provided by: Patient  Limitations to History: None  External Records Reviewed with Brief Summary: None    HPI:  Sp Morrison is a 60 y.o. male hypothyroidism, hyperlipidemia presenting for a fall.  Patient was drinking alcohol last night with a friend.  He was intoxicated when he fell down 8 steps.  He hit his head but did not lose consciousness.  He is not on blood thinners.  He was at crawl up stairs after some time and called 911.  He has pain in his left knee but denies pain anywhere else.  He did have a left knee meniscus surgery about 10 years ago.  He does not remember who the surgeon was.    Physical Exam   Triage vitals:  T 37.2 °C (99 °F)  HR (!) 114  /88  RR (!) 21  O2 94 % None (Room air)    Physical Exam  Vitals and nursing note reviewed.   Constitutional:       General: He is not in acute distress.     Appearance: He is well-developed.   HENT:      Head: Normocephalic and atraumatic.      Right Ear: External ear normal.      Left Ear: External ear normal.      Nose: Nose normal.   Eyes:      General: No scleral icterus.     Conjunctiva/sclera: Conjunctivae normal.      Pupils: Pupils are equal, round, and reactive to light.   Cardiovascular:      Rate and Rhythm: Regular rhythm. Tachycardia present.      Heart sounds: No murmur heard.  Pulmonary:      Effort: Pulmonary effort is normal. No respiratory distress.      Breath sounds: Normal breath sounds.   Abdominal:      Palpations: Abdomen is soft.      Tenderness: There is no abdominal tenderness.   Musculoskeletal:         General: Swelling, tenderness and signs of injury present. No deformity.      Cervical back: Neck supple. No rigidity.      Comments: Left knee pain with range of motion and joint effusion.   Skin:     General: Skin is warm and dry.   Neurological:      General: No focal deficit present.      Mental Status: He is alert.   Psychiatric:          Mood and Affect: Mood normal.          Medical Decision Making & ED Course   Medical Decision Makin y.o. male presenting as a limited trauma after a fall down 8 steps.  Patient is triaged as a limited trauma due to the number of steps.  Upon arrival, he is noted to be mildly tachycardic but afebrile and hemodynamically stable.  GCS 15.  Airway intact, bilateral equal breath sounds, 2+ radial distal pulses.  Secondary exam significant only for tenderness and swelling of his left knee.  Given the mechanism of action, obtain pan scans, trauma labs, as well as x-ray of his left knee.  He is given IV fluids, fentanyl.    CBC shows a mild leukocytosis of 11.9.  Potentially reactive given the patient is denying any sick symptoms.  Chemistry panel unremarkable.  Alcohol 120.  Lactate is elevated at 2.6.    CT of the head, C-spine, chest, abdomen, pelvis negative for acute injury.  X-ray of the left knee is positive for a lateral tibial plateau fracture and lipohemarthrosis.    Discussed with trauma surgeon on-call, Dr. Garcia.  No additional recommendations at this time, agrees with workup thus far.  Defers treatment of the tibial plateau fracture to orthopedics.  Discussed with the orthopedic surgeon on-call.  Dr. Kirby reviewed the patient's x-rays.  Recommends knee immobilizer and CT of the need for surgical planning.  Patient appropriate for outpatient follow-up from perspective of the orthopedic injury.    Patient remains mildly tachycardic but overall well-appearing.  Patient lives home alone and has flights of stairs up to his main living area and bedroom.  He is unsure if he will be able to go up the steps with the crutches and nonweightbearing status.  He plans on calling his family to see if they are able to help him else while he is recovering.  Patient signed out to Dr. Velázquez pending CT scan of the knee, ambulation trial, and disposition.  ----     Social Determinants of Health which Significantly  Impact Care: None identified     EKG Independent Interpretation: EKG interpreted by myself. Please see ED Course for full interpretation.    Independent Result Review and Interpretation: Relevant laboratory and radiographic results were reviewed and independently interpreted by myself.  As necessary, they are commented on in the ED Course.    Chronic conditions affecting the patient's care: As documented above in [facility]    The patient was discussed with the following consultants/services:  Trauma surgery, orthopedics.    Care Considerations: As documented above in [facility]    ED Course:  ED Course as of 02/28/25 2016 Fri Feb 28, 2025   0432 EKG taken at 423 on 2/28/2025 showing normal sinus tachycardia with a rate of 111, normal axis, normal intervals, no acute ST elevation or depression [DS]   0931 I received signout on this patient.  Is a 60-year-old male who fell down a flight of stairs.  He sustained a tibial plateau fracture.  Patient not going to be able to perform activities of daily living secondary to pain.  Patient be admitted for further evaluation management. [JJ]   0949 Neuro checks  [JJ]      ED Course User Index  [DS] Kenroy Lopez MD  [JJ] Meng Phipps DO         Diagnoses as of 02/28/25 2016   Fall, initial encounter   Closed fracture of left tibial plateau, initial encounter     Disposition   Patient was signed out to Dr. Velázquez at 0700 pending completion of their work-up.  Please see the next provider's transition of care note for the remainder of the patient's care.     Procedures   Procedures    Patient seen and discussed with ED attending physician.    Kevin Myers DO  Emergency Medicine     Kevin Myers DO  Resident  02/28/25 2016

## 2025-02-28 NOTE — DISCHARGE INSTRUCTIONS
Wear left knee immobilizer at all times, may open briefly with left leg supported on bed for skin check or hygiene purposes  Do not bear any weight on your left leg  Continue Physical and Occupational therapy  Follow up with Dr. Yeison Whitt on 3/13 at 9:30am

## 2025-03-01 LAB
ALBUMIN SERPL BCP-MCNC: 3.8 G/DL (ref 3.4–5)
ANION GAP SERPL CALC-SCNC: 12 MMOL/L (ref 10–20)
BUN SERPL-MCNC: 10 MG/DL (ref 6–23)
CALCIUM SERPL-MCNC: 10.7 MG/DL (ref 8.6–10.3)
CHLORIDE SERPL-SCNC: 105 MMOL/L (ref 98–107)
CO2 SERPL-SCNC: 26 MMOL/L (ref 21–32)
CORTIS SERPL-MCNC: 12.9 UG/DL (ref 2.5–20)
CREAT SERPL-MCNC: 0.62 MG/DL (ref 0.5–1.3)
EGFRCR SERPLBLD CKD-EPI 2021: >90 ML/MIN/1.73M*2
ERYTHROCYTE [DISTWIDTH] IN BLOOD BY AUTOMATED COUNT: 12.9 % (ref 11.5–14.5)
GLUCOSE SERPL-MCNC: 106 MG/DL (ref 74–99)
HCT VFR BLD AUTO: 39 % (ref 41–52)
HGB BLD-MCNC: 13.1 G/DL (ref 13.5–17.5)
HOLD SPECIMEN: NORMAL
MCH RBC QN AUTO: 31 PG (ref 26–34)
MCHC RBC AUTO-ENTMCNC: 33.6 G/DL (ref 32–36)
MCV RBC AUTO: 92 FL (ref 80–100)
MYOGLOBIN SERPL-MCNC: 68 UG/L
NRBC BLD-RTO: 0 /100 WBCS (ref 0–0)
PHOSPHATE SERPL-MCNC: 2.3 MG/DL (ref 2.5–4.9)
PLATELET # BLD AUTO: 274 X10*3/UL (ref 150–450)
POTASSIUM SERPL-SCNC: 4 MMOL/L (ref 3.5–5.3)
PROT SERPL-MCNC: 6.7 G/DL (ref 6.4–8.2)
PTH-INTACT SERPL-MCNC: 94 PG/ML (ref 18.5–88)
RBC # BLD AUTO: 4.22 X10*6/UL (ref 4.5–5.9)
SODIUM SERPL-SCNC: 139 MMOL/L (ref 136–145)
WBC # BLD AUTO: 7.7 X10*3/UL (ref 4.4–11.3)

## 2025-03-01 PROCEDURE — 2500000001 HC RX 250 WO HCPCS SELF ADMINISTERED DRUGS (ALT 637 FOR MEDICARE OP): Performed by: NURSE PRACTITIONER

## 2025-03-01 PROCEDURE — 85027 COMPLETE CBC AUTOMATED: CPT | Performed by: NURSE PRACTITIONER

## 2025-03-01 PROCEDURE — 80069 RENAL FUNCTION PANEL: CPT | Performed by: INTERNAL MEDICINE

## 2025-03-01 PROCEDURE — 97161 PT EVAL LOW COMPLEX 20 MIN: CPT | Mod: GP

## 2025-03-01 PROCEDURE — 1200000002 HC GENERAL ROOM WITH TELEMETRY DAILY

## 2025-03-01 PROCEDURE — 2500000002 HC RX 250 W HCPCS SELF ADMINISTERED DRUGS (ALT 637 FOR MEDICARE OP, ALT 636 FOR OP/ED): Performed by: NURSE PRACTITIONER

## 2025-03-01 PROCEDURE — 36415 COLL VENOUS BLD VENIPUNCTURE: CPT | Performed by: INTERNAL MEDICINE

## 2025-03-01 PROCEDURE — 2500000005 HC RX 250 GENERAL PHARMACY W/O HCPCS: Performed by: NURSE PRACTITIONER

## 2025-03-01 PROCEDURE — 2500000004 HC RX 250 GENERAL PHARMACY W/ HCPCS (ALT 636 FOR OP/ED): Performed by: NURSE PRACTITIONER

## 2025-03-01 PROCEDURE — 97165 OT EVAL LOW COMPLEX 30 MIN: CPT | Mod: GO | Performed by: OCCUPATIONAL THERAPIST

## 2025-03-01 RX ORDER — ALBUTEROL SULFATE 90 UG/1
2 INHALANT RESPIRATORY (INHALATION) EVERY 6 HOURS PRN
Status: DISCONTINUED | OUTPATIENT
Start: 2025-03-01 | End: 2025-03-01

## 2025-03-01 RX ORDER — LEVOTHYROXINE SODIUM 150 UG/1
150 TABLET ORAL DAILY
Status: DISCONTINUED | OUTPATIENT
Start: 2025-03-01 | End: 2025-03-08 | Stop reason: HOSPADM

## 2025-03-01 RX ORDER — ALBUTEROL SULFATE 0.83 MG/ML
2.5 SOLUTION RESPIRATORY (INHALATION) EVERY 6 HOURS PRN
Status: DISCONTINUED | OUTPATIENT
Start: 2025-03-01 | End: 2025-03-08 | Stop reason: HOSPADM

## 2025-03-01 RX ORDER — PREDNISONE 20 MG/1
20 TABLET ORAL DAILY
Status: DISCONTINUED | OUTPATIENT
Start: 2025-03-01 | End: 2025-03-03

## 2025-03-01 RX ORDER — PANTOPRAZOLE SODIUM 40 MG/1
40 TABLET, DELAYED RELEASE ORAL
Status: DISCONTINUED | OUTPATIENT
Start: 2025-03-01 | End: 2025-03-08 | Stop reason: HOSPADM

## 2025-03-01 RX ORDER — LATANOPROST 50 UG/ML
1 SOLUTION/ DROPS OPHTHALMIC NIGHTLY
Status: DISCONTINUED | OUTPATIENT
Start: 2025-03-01 | End: 2025-03-08 | Stop reason: HOSPADM

## 2025-03-01 RX ORDER — AZITHROMYCIN 500 MG/1
500 TABLET, FILM COATED ORAL
Status: COMPLETED | OUTPATIENT
Start: 2025-03-01 | End: 2025-03-03

## 2025-03-01 RX ORDER — QUETIAPINE FUMARATE 100 MG/1
100 TABLET, FILM COATED ORAL NIGHTLY
Status: DISCONTINUED | OUTPATIENT
Start: 2025-03-01 | End: 2025-03-08 | Stop reason: HOSPADM

## 2025-03-01 RX ORDER — ALPRAZOLAM 0.5 MG/1
0.5 TABLET ORAL DAILY
Status: DISCONTINUED | OUTPATIENT
Start: 2025-03-01 | End: 2025-03-06

## 2025-03-01 RX ORDER — DORZOLAMIDE HYDROCHLORIDE AND TIMOLOL MALEATE 20; 5 MG/ML; MG/ML
1 SOLUTION/ DROPS OPHTHALMIC 2 TIMES DAILY
Status: DISCONTINUED | OUTPATIENT
Start: 2025-03-01 | End: 2025-03-08 | Stop reason: HOSPADM

## 2025-03-01 RX ADMIN — QUETIAPINE FUMARATE 100 MG: 100 TABLET ORAL at 20:12

## 2025-03-01 RX ADMIN — AZITHROMYCIN 500 MG: 500 TABLET, FILM COATED ORAL at 10:17

## 2025-03-01 RX ADMIN — Medication 3 MG: at 01:47

## 2025-03-01 RX ADMIN — ACETAMINOPHEN 650 MG: 325 TABLET ORAL at 06:38

## 2025-03-01 RX ADMIN — FOLIC ACID 1 MG: 1 TABLET ORAL at 10:17

## 2025-03-01 RX ADMIN — PANTOPRAZOLE SODIUM 40 MG: 40 TABLET, DELAYED RELEASE ORAL at 06:38

## 2025-03-01 RX ADMIN — DORZOLAMIDE HYDROCHLORIDE AND TIMOLOL MALEATE 1 DROP: 20; 5 SOLUTION/ DROPS OPHTHALMIC at 10:48

## 2025-03-01 RX ADMIN — Medication 500 MG: at 18:47

## 2025-03-01 RX ADMIN — ACETAMINOPHEN 650 MG: 325 TABLET ORAL at 01:45

## 2025-03-01 RX ADMIN — Medication 1 TABLET: at 10:17

## 2025-03-01 RX ADMIN — THIAMINE HCL TAB 100 MG 100 MG: 100 TAB at 10:17

## 2025-03-01 RX ADMIN — Medication 500 MG: at 13:55

## 2025-03-01 RX ADMIN — PREDNISONE 20 MG: 20 TABLET ORAL at 10:17

## 2025-03-01 RX ADMIN — METOPROLOL TARTRATE 5 MG: 5 INJECTION INTRAVENOUS at 14:02

## 2025-03-01 RX ADMIN — Medication 500 MG: at 20:12

## 2025-03-01 RX ADMIN — LEVOTHYROXINE SODIUM 150 MCG: 150 TABLET ORAL at 13:55

## 2025-03-01 RX ADMIN — ALPRAZOLAM 0.5 MG: 0.5 TABLET ORAL at 18:47

## 2025-03-01 SDOH — SOCIAL STABILITY: SOCIAL INSECURITY: WITHIN THE LAST YEAR, HAVE YOU BEEN HUMILIATED OR EMOTIONALLY ABUSED IN OTHER WAYS BY YOUR PARTNER OR EX-PARTNER?: NO

## 2025-03-01 SDOH — ECONOMIC STABILITY: FOOD INSECURITY
WITHIN THE PAST 12 MONTHS, YOU WORRIED THAT YOUR FOOD WOULD RUN OUT BEFORE YOU GOT THE MONEY TO BUY MORE.: PATIENT DECLINED

## 2025-03-01 SDOH — SOCIAL STABILITY: SOCIAL INSECURITY: HAVE YOU HAD ANY THOUGHTS OF HARMING ANYONE ELSE?: NO

## 2025-03-01 SDOH — HEALTH STABILITY: MENTAL HEALTH: HOW OFTEN DO YOU HAVE SIX OR MORE DRINKS ON ONE OCCASION?: WEEKLY

## 2025-03-01 SDOH — SOCIAL STABILITY: SOCIAL INSECURITY: ABUSE: ADULT

## 2025-03-01 SDOH — HEALTH STABILITY: MENTAL HEALTH: HOW MANY DRINKS CONTAINING ALCOHOL DO YOU HAVE ON A TYPICAL DAY WHEN YOU ARE DRINKING?: 5 OR 6

## 2025-03-01 SDOH — SOCIAL STABILITY: SOCIAL INSECURITY: DOES ANYONE TRY TO KEEP YOU FROM HAVING/CONTACTING OTHER FRIENDS OR DOING THINGS OUTSIDE YOUR HOME?: NO

## 2025-03-01 SDOH — SOCIAL STABILITY: SOCIAL INSECURITY: DO YOU FEEL ANYONE HAS EXPLOITED OR TAKEN ADVANTAGE OF YOU FINANCIALLY OR OF YOUR PERSONAL PROPERTY?: NO

## 2025-03-01 SDOH — SOCIAL STABILITY: SOCIAL INSECURITY: WITHIN THE LAST YEAR, HAVE YOU BEEN AFRAID OF YOUR PARTNER OR EX-PARTNER?: NO

## 2025-03-01 SDOH — HEALTH STABILITY: MENTAL HEALTH: HOW OFTEN DO YOU HAVE A DRINK CONTAINING ALCOHOL?: 2-4 TIMES A MONTH

## 2025-03-01 SDOH — SOCIAL STABILITY: SOCIAL INSECURITY: ARE YOU OR HAVE YOU BEEN THREATENED OR ABUSED PHYSICALLY, EMOTIONALLY, OR SEXUALLY BY ANYONE?: NO

## 2025-03-01 SDOH — SOCIAL STABILITY: SOCIAL INSECURITY
WITHIN THE LAST YEAR, HAVE YOU BEEN KICKED, HIT, SLAPPED, OR OTHERWISE PHYSICALLY HURT BY YOUR PARTNER OR EX-PARTNER?: NO

## 2025-03-01 SDOH — SOCIAL STABILITY: SOCIAL INSECURITY
WITHIN THE LAST YEAR, HAVE YOU BEEN RAPED OR FORCED TO HAVE ANY KIND OF SEXUAL ACTIVITY BY YOUR PARTNER OR EX-PARTNER?: NO

## 2025-03-01 SDOH — SOCIAL STABILITY: SOCIAL INSECURITY: ARE THERE ANY APPARENT SIGNS OF INJURIES/BEHAVIORS THAT COULD BE RELATED TO ABUSE/NEGLECT?: NO

## 2025-03-01 SDOH — SOCIAL STABILITY: SOCIAL INSECURITY: HAS ANYONE EVER THREATENED TO HURT YOUR FAMILY OR YOUR PETS?: NO

## 2025-03-01 SDOH — SOCIAL STABILITY: SOCIAL INSECURITY: WERE YOU ABLE TO COMPLETE ALL THE BEHAVIORAL HEALTH SCREENINGS?: YES

## 2025-03-01 SDOH — ECONOMIC STABILITY: INCOME INSECURITY
IN THE PAST 12 MONTHS HAS THE ELECTRIC, GAS, OIL, OR WATER COMPANY THREATENED TO SHUT OFF SERVICES IN YOUR HOME?: PATIENT DECLINED

## 2025-03-01 SDOH — SOCIAL STABILITY: SOCIAL INSECURITY: HAVE YOU HAD THOUGHTS OF HARMING ANYONE ELSE?: NO

## 2025-03-01 SDOH — SOCIAL STABILITY: SOCIAL INSECURITY: DO YOU FEEL UNSAFE GOING BACK TO THE PLACE WHERE YOU ARE LIVING?: NO

## 2025-03-01 SDOH — ECONOMIC STABILITY: FOOD INSECURITY: WITHIN THE PAST 12 MONTHS, THE FOOD YOU BOUGHT JUST DIDN'T LAST AND YOU DIDN'T HAVE MONEY TO GET MORE.: PATIENT DECLINED

## 2025-03-01 ASSESSMENT — LIFESTYLE VARIABLES
PULSE: 100
AGITATION: NORMAL ACTIVITY
ORIENTATION AND CLOUDING OF SENSORIUM: ORIENTED AND CAN DO SERIAL ADDITIONS
PULSE: 92
ANXIETY: MILDLY ANXIOUS
PAROXYSMAL SWEATS: NO SWEAT VISIBLE
ORIENTATION AND CLOUDING OF SENSORIUM: ORIENTED AND CAN DO SERIAL ADDITIONS
PAROXYSMAL SWEATS: NO SWEAT VISIBLE
BLOOD PRESSURE: 186/100
NAUSEA AND VOMITING: NO NAUSEA AND NO VOMITING
PAROXYSMAL SWEATS: NO SWEAT VISIBLE
PAROXYSMAL SWEATS: NO SWEAT VISIBLE
TOTAL SCORE: 0
HEADACHE, FULLNESS IN HEAD: NOT PRESENT
PAROXYSMAL SWEATS: NO SWEAT VISIBLE
ORIENTATION AND CLOUDING OF SENSORIUM: ORIENTED AND CAN DO SERIAL ADDITIONS
PAROXYSMAL SWEATS: NO SWEAT VISIBLE
ANXIETY: NO ANXIETY, AT EASE
VISUAL DISTURBANCES: NOT PRESENT
HEADACHE, FULLNESS IN HEAD: NOT PRESENT
TOTAL SCORE: 0
AGITATION: SOMEWHAT MORE THAN NORMAL ACTIVITY
HEADACHE, FULLNESS IN HEAD: NOT PRESENT
PAROXYSMAL SWEATS: NO SWEAT VISIBLE
VISUAL DISTURBANCES: NOT PRESENT
HEADACHE, FULLNESS IN HEAD: NOT PRESENT
NAUSEA AND VOMITING: NO NAUSEA AND NO VOMITING
HEADACHE, FULLNESS IN HEAD: NOT PRESENT
VISUAL DISTURBANCES: NOT PRESENT
AUDITORY DISTURBANCES: NOT PRESENT
HEADACHE, FULLNESS IN HEAD: NOT PRESENT
TREMOR: NO TREMOR
HEADACHE, FULLNESS IN HEAD: NOT PRESENT
AGITATION: SOMEWHAT MORE THAN NORMAL ACTIVITY
AGITATION: NORMAL ACTIVITY
NAUSEA AND VOMITING: NO NAUSEA AND NO VOMITING
TREMOR: NO TREMOR
ORIENTATION AND CLOUDING OF SENSORIUM: ORIENTED AND CAN DO SERIAL ADDITIONS
ORIENTATION AND CLOUDING OF SENSORIUM: ORIENTED AND CAN DO SERIAL ADDITIONS
TREMOR: NO TREMOR
TOTAL SCORE: 0
ORIENTATION AND CLOUDING OF SENSORIUM: ORIENTED AND CAN DO SERIAL ADDITIONS
VISUAL DISTURBANCES: NOT PRESENT
AUDITORY DISTURBANCES: NOT PRESENT
AGITATION: NORMAL ACTIVITY
NAUSEA AND VOMITING: NO NAUSEA AND NO VOMITING
AUDITORY DISTURBANCES: NOT PRESENT
HEADACHE, FULLNESS IN HEAD: NOT PRESENT
AGITATION: NORMAL ACTIVITY
AUDIT-C TOTAL SCORE: 7
VISUAL DISTURBANCES: NOT PRESENT
PULSE: 88
PULSE: 75
NAUSEA AND VOMITING: NO NAUSEA AND NO VOMITING
VISUAL DISTURBANCES: NOT PRESENT
ANXIETY: MILDLY ANXIOUS
NAUSEA AND VOMITING: NO NAUSEA AND NO VOMITING
AUDITORY DISTURBANCES: NOT PRESENT
PAROXYSMAL SWEATS: NO SWEAT VISIBLE
NAUSEA AND VOMITING: NO NAUSEA AND NO VOMITING
PAROXYSMAL SWEATS: NO SWEAT VISIBLE
TOTAL SCORE: 2
TREMOR: NO TREMOR
ORIENTATION AND CLOUDING OF SENSORIUM: ORIENTED AND CAN DO SERIAL ADDITIONS
VISUAL DISTURBANCES: NOT PRESENT
TOTAL SCORE: 0
PULSE: 82
BLOOD PRESSURE: 129/102
ANXIETY: NO ANXIETY, AT EASE
ORIENTATION AND CLOUDING OF SENSORIUM: ORIENTED AND CAN DO SERIAL ADDITIONS
AUDITORY DISTURBANCES: NOT PRESENT
TREMOR: NO TREMOR
ANXIETY: NO ANXIETY, AT EASE
ANXIETY: MILDLY ANXIOUS
NAUSEA AND VOMITING: NO NAUSEA AND NO VOMITING
HEADACHE, FULLNESS IN HEAD: NOT PRESENT
ANXIETY: NO ANXIETY, AT EASE
AUDITORY DISTURBANCES: NOT PRESENT
TREMOR: NO TREMOR
VISUAL DISTURBANCES: NOT PRESENT
AGITATION: NORMAL ACTIVITY
TOTAL SCORE: 0
ANXIETY: NO ANXIETY, AT EASE
TREMOR: NO TREMOR
VISUAL DISTURBANCES: NOT PRESENT
TREMOR: NO TREMOR
VISUAL DISTURBANCES: NOT PRESENT
ANXIETY: NO ANXIETY, AT EASE
HEADACHE, FULLNESS IN HEAD: NOT PRESENT
AGITATION: NORMAL ACTIVITY
TOTAL SCORE: 1
ORIENTATION AND CLOUDING OF SENSORIUM: ORIENTED AND CAN DO SERIAL ADDITIONS
SKIP TO QUESTIONS 9-10: 0
AGITATION: SOMEWHAT MORE THAN NORMAL ACTIVITY
ANXIETY: NO ANXIETY, AT EASE
TREMOR: NO TREMOR
ORIENTATION AND CLOUDING OF SENSORIUM: ORIENTED AND CAN DO SERIAL ADDITIONS
TOTAL SCORE: 2
AUDITORY DISTURBANCES: NOT PRESENT
ORIENTATION AND CLOUDING OF SENSORIUM: ORIENTED AND CAN DO SERIAL ADDITIONS
AGITATION: NORMAL ACTIVITY
TOTAL SCORE: 3
AUDITORY DISTURBANCES: NOT PRESENT
TREMOR: NO TREMOR
VISUAL DISTURBANCES: NOT PRESENT
PAROXYSMAL SWEATS: NO SWEAT VISIBLE
TREMOR: NO TREMOR
TOTAL SCORE: 0
NAUSEA AND VOMITING: NO NAUSEA AND NO VOMITING
PAROXYSMAL SWEATS: NO SWEAT VISIBLE
HEADACHE, FULLNESS IN HEAD: NOT PRESENT
NAUSEA AND VOMITING: NO NAUSEA AND NO VOMITING
TOTAL SCORE: 0
ANXIETY: 2
AUDITORY DISTURBANCES: NOT PRESENT
AGITATION: NORMAL ACTIVITY
BLOOD PRESSURE: 134/92
NAUSEA AND VOMITING: NO NAUSEA AND NO VOMITING

## 2025-03-01 ASSESSMENT — PATIENT HEALTH QUESTIONNAIRE - PHQ9
1. LITTLE INTEREST OR PLEASURE IN DOING THINGS: NOT AT ALL
2. FEELING DOWN, DEPRESSED OR HOPELESS: NOT AT ALL
SUM OF ALL RESPONSES TO PHQ9 QUESTIONS 1 & 2: 0

## 2025-03-01 ASSESSMENT — PAIN SCALES - GENERAL
PAINLEVEL_OUTOF10: 6
PAINLEVEL_OUTOF10: 5 - MODERATE PAIN
PAINLEVEL_OUTOF10: 8
PAINLEVEL_OUTOF10: 2
PAINLEVEL_OUTOF10: 7
PAINLEVEL_OUTOF10: 0 - NO PAIN
PAINLEVEL_OUTOF10: 9

## 2025-03-01 ASSESSMENT — COGNITIVE AND FUNCTIONAL STATUS - GENERAL
DAILY ACTIVITIY SCORE: 18
CLIMB 3 TO 5 STEPS WITH RAILING: A LOT
DRESSING REGULAR LOWER BODY CLOTHING: A LOT
STANDING UP FROM CHAIR USING ARMS: A LITTLE
STANDING UP FROM CHAIR USING ARMS: A LOT
WALKING IN HOSPITAL ROOM: A LOT
CLIMB 3 TO 5 STEPS WITH RAILING: A LOT
PERSONAL GROOMING: A LITTLE
MOVING TO AND FROM BED TO CHAIR: A LOT
DAILY ACTIVITIY SCORE: 16
TOILETING: A LITTLE
MOBILITY SCORE: 12
DRESSING REGULAR UPPER BODY CLOTHING: A LITTLE
HELP NEEDED FOR BATHING: A LITTLE
MOVING TO AND FROM BED TO CHAIR: A LITTLE
EATING MEALS: A LITTLE
PERSONAL GROOMING: A LITTLE
WALKING IN HOSPITAL ROOM: A LOT
MOVING FROM LYING ON BACK TO SITTING ON SIDE OF FLAT BED WITH BEDRAILS: A LOT
MOBILITY SCORE: 16
HELP NEEDED FOR BATHING: A LOT
DRESSING REGULAR UPPER BODY CLOTHING: A LITTLE
TURNING FROM BACK TO SIDE WHILE IN FLAT BAD: A LOT
MOVING FROM LYING ON BACK TO SITTING ON SIDE OF FLAT BED WITH BEDRAILS: A LITTLE
DRESSING REGULAR LOWER BODY CLOTHING: A LITTLE
PATIENT BASELINE BEDBOUND: NO
TOILETING: A LOT
TURNING FROM BACK TO SIDE WHILE IN FLAT BAD: A LITTLE

## 2025-03-01 ASSESSMENT — PAIN DESCRIPTION - ORIENTATION: ORIENTATION: LEFT

## 2025-03-01 ASSESSMENT — ACTIVITIES OF DAILY LIVING (ADL)
HEARING - LEFT EAR: FUNCTIONAL
WALKS IN HOME: INDEPENDENT
TOILETING: INDEPENDENT
HEARING - RIGHT EAR: FUNCTIONAL
ADEQUATE_TO_COMPLETE_ADL: YES
PATIENT'S MEMORY ADEQUATE TO SAFELY COMPLETE DAILY ACTIVITIES?: YES
JUDGMENT_ADEQUATE_SAFELY_COMPLETE_DAILY_ACTIVITIES: YES
DRESSING YOURSELF: INDEPENDENT
LACK_OF_TRANSPORTATION: PATIENT DECLINED
ASSISTIVE_DEVICE: EYEGLASSES
BATHING: INDEPENDENT
FEEDING YOURSELF: INDEPENDENT
GROOMING: INDEPENDENT

## 2025-03-01 ASSESSMENT — PAIN DESCRIPTION - LOCATION: LOCATION: LEG

## 2025-03-01 ASSESSMENT — PAIN - FUNCTIONAL ASSESSMENT
PAIN_FUNCTIONAL_ASSESSMENT: 0-10

## 2025-03-01 NOTE — PROGRESS NOTES
Occupational Therapy    Evaluation    Patient Name: Sp Morrison  MRN: [id number]  Today's Date: 3/1/2025  Time Calculation  Start Time: 0901  Stop Time: 0918  Time Calculation (min): 17 min  FT07/FT07  Eval only     Assessment  IP OT Assessment  Prognosis: Good  Medical Staff Made Aware: Yes  End of Session Communication: Bedside nurse  End of Session Patient Position: Bed, 3 rail up, Alarm on  Patient presents with decline in ADLs, functional transfers, and functional mobility tasks and would benefit from OT during acute stay to improve functional independence and safety.  Patient currently requires 24 hour hands on assistance. Recommend high intensity OT to maximize functional independence and safety.      Plan:  Treatment Interventions: ADL retraining, Functional transfer training, Patient/family training, Equipment evaluation/education, Compensatory technique education  OT Frequency: 5 times per week  OT Discharge Recommendations: High intensity level of continued care  OT - OK to Discharge: Yes from acute care OT services to the next level of care when cleared by medical team      Subjective   Current Problem:  1. Fall, initial encounter        2. Closed fracture of left tibial plateau, initial encounter            General:  General  Reason for Referral: ADLs  Referred By: Kelvin Cuellar MD  Past Medical History Relevant to Rehab: Admitted 2/28/2025 after falling down 8 steps.  Xray left knee (+) lateral tibial plateau fracture.  Ortho consult completed and (-) surgical intervention.  Patient to wear immobilizer and maintain NWB.  Family/Caregiver Present: Yes (2 sisters)  Co-Treatment: PT  Co-Treatment Reason: fo safety  Prior to Session Communication: Bedside nurse  Patient Position Received: Bed, 3 rail up  Preferred Learning Style: verbal  General Comment: Patient in long legged sitting in bed and agreeable  to participate in OT evaluation. Patients family at bedside    Precautions:  LE Weight  Bearing Status: Left Non-Weight Bearing (immobilizer on at all times)  Medical Precautions: Fall precautions (CIWA protocol)    Pain:  Pain Assessment  Pain Assessment: 0-10  0-10 (Numeric) Pain Score: 8  Pain Type: Acute pain  Pain Location: Knee  Pain Orientation: Left  Pain Interventions: Rest    Objective   Cognition:  Overall Cognitive Status: Within Functional Limits  Safety/Judgement:  (decreased safety awareness)  Insight: Mild  Impulsive: Moderately    Home Living/Prior Level of function:  Home Living Comments: Lives in 2 story home alone with 2 BARBARA and full flight up to bedroom and full bathroom. Has 1st floor 1/2 bathroom on first floor.  Has tub shower.  Plans to stay with sisters who live in a 1 story condo with 1 BARBARA and 1 step down inside condo.  Has WIS.    Prior to fall, patient was independent with ADLS, IADLS and functional mobility tasks without AD     ADL:  LE Dressing Assistance: Maximal (to pull up underwear)    Activity Tolerance:  Endurance: Decreased tolerance for upright activites    Bed Mobility/Transfers:   Bed Mobility  Bed Mobility:  (mod assist of 2 supine to sit at edge of bed and mod assist sit to supine.)       Ambulation/Gait Training:  Functional Mobility  Functional Mobility Performed:  (mod assist of 2 with WW to take a few hopping steps forward and back.  Patient required min verbal cues for left LE NWB)    Sensation:  Sensation Comment: reports left foot numbness    Extremities: RUE   RUE : Within Functional Limits and LUE   LUE: Within Functional Limits    Outcome Measures: [facility] Daily Activity  Putting on and taking off regular lower body clothing: A lot  Bathing (including washing, rinsing, drying): A lot  Putting on and taking off regular upper body clothing: A little  Toileting, which includes using toilet, bedpan or urinal: A lot  Taking care of personal grooming such as brushing teeth: A little  Eating Meals: None  Daily Activity - Total Score:  16    EDUCATION:  Education  Individual(s) Educated: Patient  Education Provided: Fall precautons (NWB left LE)  Patient Response to Education: Patient/Caregiver Verbalized Understanding of Information    Goals:   Encounter Problems       Encounter Problems (Active)       Dressings Lower Extremities       STG - Patient will complete lower body dressing with mod assist with comp strategies and AE to maintain left LE WB status (Progressing)       Start:  03/01/25    Expected End:  03/15/25               Functional Balance       STG-Patient will be min assist with assistive device dynamic stand task >3 minutes for ADL completion maintaining left LE WB status (Progressing)       Start:  03/01/25    Expected End:  03/15/25               Functional Mobility       STG-Patient will be min assist with assistive device functional mobility tasks  maintaining left LE WB status (Progressing)       Start:  03/01/25    Expected End:  03/15/25               OT Transfers       STG-Patient will be min assist with functional transfers demonstrating good safety  (maintaining left LE WB status) (Progressing)       Start:  03/01/25    Expected End:  03/15/25               Safety       STG-Patient will independently verbalize left LE WB precautions with all functional tasks   (Progressing)       Start:  03/01/25    Expected End:  03/15/25                       REQUIRED- Click to run Sepsis Recognition Calculator

## 2025-03-01 NOTE — PROGRESS NOTES
Physical Therapy    Physical Therapy Evaluation    Patient Name: Sp Morrison  MRN: [id number]  Today's Date: 3/1/2025   Time Calculation  Start Time: 0902  Stop Time: 0918  Time Calculation (min): 16 min  FT07/FT07    Assessment/Plan   PT Assessment  PT Assessment Results: Decreased strength, Decreased endurance, Decreased range of motion, Decreased mobility, Pain, Impaired sensation  Rehab Prognosis: Good  Evaluation/Treatment Tolerance: Patient limited by pain  Medical Staff Made Aware: Yes  Strengths: Ability to acquire knowledge, Attitude of self  End of Session Communication: Bedside nurse  Assessment Comment: Is slow to move, weak, and easily fatigues.  End of Session Patient Position: Bed, 3 rail up, Alarm off, not on at start of session  IP OR SWING BED PT PLAN  Inpatient or Swing Bed: Inpatient  PT Plan  Treatment/Interventions: Bed mobility, Transfer training, Gait training, Stair training, Strengthening, Endurance training, Therapeutic exercise, Therapeutic activity, Home exercise program  PT Plan: Ongoing PT  PT Frequency: Daily  PT Discharge Recommendations: High intensity level of continued care  Equipment Recommended upon Discharge: Wheeled walker, Wheelchair  PT Recommended Transfer Status: Assist x2  PT - OK to Discharge: Yes (When medically allowed.)    Subjective     Current Problem:  1. Fall, initial encounter        2. Closed fracture of left tibial plateau, initial encounter          Patient Active Problem List   Diagnosis    Esophageal varices without bleeding, unspecified esophageal varices type (Multi)    Hyperparathyroidism (Multi)    Hypothyroidism    Anxiety    Constipation    Erectile dysfunction    GERD without esophagitis    Glaucoma    Hepatitis C virus infection without hepatic coma    History of left knee replacement    Hypercalcemia    History of thyroid disorder    Hyperlipidemia    Insomnia    Left anterior cruciate ligament tear    Recurrent genital herpes    Vitamin D  deficiency    Acquired hypothyroidism    Hyperplastic polyp of large intestine    Fall    Elevated lactic acid level    Tibial plateau fracture, left    Lipohemarthrosis    Fall, initial encounter       General Visit Information:  General  Reason for Referral: Fall / L knee lateral tibial plateau fx..  Referred By: AMADA Cuellar  Past Medical History Relevant to Rehab: Admit 2/28 after a fall down 8 stairs at home while intoxicated.             H/O ETOH intoxication, R knee scoped..  X-ray confirmed fx..  No surgery warranted.  Family/Caregiver Present: Yes  Co-Treatment: OT  Co-Treatment Reason: safety  Prior to Session Communication: Bedside nurse  Patient Position Received: Bed, 3 rail up, Alarm off, not on at start of session  Preferred Learning Style: verbal, visual  General Comment: Has an IV, Tele., and an Immobilizer on L knee.    Home Living:  Home Living  Type of Home: House  Lives With: Siblings  Home Adaptive Equipment:  (Sisters have a BSC.)  Home Layout: One level  Home Access: Stairs to enter without rails  Entrance Stairs-Number of Steps: 1  Bathroom Shower/Tub: Walk-in shower  Home Living Comments: Plans to stay with his 2 sisters.  His 2 story hse. has 10 stairs up to bedroom with a railing.  Lives alone.    Prior Level of Function:  Prior Function Per Pt/Caregiver Report  Level of Nicholas: Independent with homemaking with ambulation  Ambulatory Assistance: Independent  Vocational: Retired    Precautions:  Precautions  LE Weight Bearing Status: Left Non-Weight Bearing  Medical Precautions: Fall precautions    Vital Signs:     Objective     Pain:  Pain Assessment  Pain Assessment: 0-10  0-10 (Numeric) Pain Score: 7  Pain Type: Acute pain  Pain Location: Knee  Pain Orientation: Left  Pain Interventions: Medication (See MAR), Elevated, Rest    Cognition:  Cognition  Overall Cognitive Status: Within Functional Limits    General Assessments:  General Observation  General Observation: Pleasant and  cooperative.   Activity Tolerance  Endurance: Decreased tolerance for upright activites  Activity Tolerance Comments: Increased exertion observed when up in walker.  Sensation  Light Touch:  (Intact to L toes.)  Sensation Comment: L foot feels numb.  Strength  Strength Comments: R LE is WFL.  L LE is weak from hip down.     Coordination  Movements are Fluid and Coordinated: Yes  Postural Control  Postural Control: Within Functional Limits  Posture Comment: 5'11''          Functional Assessments:     Bed Mobility  Bed Mobility: Yes  Bed Mobility 1  Bed Mobility 1: Supine to sitting, Sitting to supine  Level of Assistance 1: Moderate assistance (x 2 to reach EOB.   Mod Ax1 to re-enter the bed.)  Transfers  Transfer: Yes  Transfer 1  Transfer From 1: Sit to, Stand to  Transfer Device 1: Walker  Transfer Level of Assistance 1: Moderate assistance (x 2.)  Ambulation/Gait Training  Ambulation/Gait Training Performed: Yes  Ambulation/Gait Training 1  Surface 1: Level tile  Device 1: Rolling walker  Gait Support Devices: Gait belt  Assistance 1: Moderate assistance (x 1.)  Comments/Distance (ft) 1: x 6 ft.  Basically a hopping step.  Stairs  Stairs: No       Extremity/Trunk Assessments:        RLE   RLE : Within Functional Limits  LLE   LLE : Exceptions to WFL    Outcome Measures:     [facility] Basic Mobility  Turning from your back to your side while in a flat bed without using bedrails: A lot  Moving from lying on your back to sitting on the side of a flat bed without using bedrails: A lot  Moving to and from bed to chair (including a wheelchair): A lot  Standing up from a chair using your arms (e.g. wheelchair or bedside chair): A lot  To walk in hospital room: A lot  Climbing 3-5 steps with railing: A lot  Basic Mobility - Total Score: 12        Goals:  Encounter Problems       Encounter Problems (Active)       Mobility       STG - Patient will ambulate X 20-40 Ft. with a w/w, CGA.         Start:  03/01/25    Expected End:   03/15/25            STG - Patient will ambulate up and down a curb/step with a walker, MIN A x1.         Start:  03/01/25    Expected End:  03/15/25            Goal 1:  DEMO a G=SLR on L.         Start:  03/01/25    Expected End:  03/15/25               PT Transfers       STG - Patient will perform bed mobility with CGA.         Start:  03/01/25    Expected End:  03/15/25            STG - Patient will transfer sit to and from stand into a w/w, CGA.         Start:  03/01/25    Expected End:  03/15/25               Safety       STG - Patient uses gait belt during all transfers, curb step trng., and amb. trng..        Start:  03/01/25    Expected End:  03/15/25                 Education Documentation  Handouts, taught by Dave Bell, PT at 3/1/2025 10:22 AM.  Learner: Patient  Readiness: Acceptance  Method: Demonstration, Explanation  Response: Demonstrated Understanding, Needs Reinforcement    Precautions, taught by Dave Bell PT at 3/1/2025 10:22 AM.  Learner: Patient  Readiness: Acceptance  Method: Demonstration, Explanation  Response: Demonstrated Understanding, Needs Reinforcement    Body Mechanics, taught by Dave Bell PT at 3/1/2025 10:22 AM.  Learner: Patient  Readiness: Acceptance  Method: Demonstration, Explanation  Response: Demonstrated Understanding, Needs Reinforcement    Home Exercise Program, taught by Dave Bell, PT at 3/1/2025 10:22 AM.  Learner: Patient  Readiness: Acceptance  Method: Demonstration, Explanation  Response: Demonstrated Understanding, Needs Reinforcement    Mobility Training, taught by Dave Bell PT at 3/1/2025 10:22 AM.  Learner: Patient  Readiness: Acceptance  Method: Demonstration, Explanation  Response: Demonstrated Understanding, Needs Reinforcement    Education Comments  No comments found.

## 2025-03-01 NOTE — PROGRESS NOTES
Sp Morrison is a 60 y.o. male on day 0 of admission presenting with Tibial plateau fracture, left.      Subjective   Patient is feeling well, no N/V, no CP or SOB.          Objective          Vitals 24HR  Heart Rate:  []   Temperature:  [36.5 °C (97.7 °F)-36.8 °C (98.2 °F)]   Respirations:  [11-26]   BP: (138-192)/()   Pulse Ox:  [92 %-98 %]     Intake/Output last 3 Shifts:    Intake/Output Summary (Last 24 hours) at 3/1/2025 9967  Last data filed at 2/28/2025 2230  Gross per 24 hour   Intake 2340 ml   Output 600 ml   Net 1740 ml       Physical Exam  GENERAL: No distress.   SKIN: No rashes or lesions.  EYES: PERRLA, EOMI  HEENT: Head: Normocephalic  Neck: supple and no adenopathy  LUNGS: Lungs clear to auscultation. Good diaphragmatic excursion.  CARDIAC: Normal S1 and S2; no rubs, murmurs, or gallops  ABDOMEN: Abdomen soft, non-tender. BS normal.   EXTREMITIES: No ulcers, Extremities normal.   NEURO: No focal deficit.     Relevant Results             Scheduled medications  folic acid, 1 mg, oral, Daily  [Held by provider] heparin (porcine), 5,000 Units, subcutaneous, q8h  multivitamin with minerals, 1 tablet, oral, Daily  pantoprazole, 40 mg, oral, Daily before breakfast   Or  pantoprazole, 40 mg, intravenous, Daily before breakfast  thiamine, 100 mg, oral, Daily      Continuous medications  lactated Ringer's, 100 mL/hr, Last Rate: Stopped (02/28/25 2029)  lactated Ringer's, 100 mL/hr, Last Rate: 100 mL/hr (02/28/25 2029)      PRN medications  PRN medications: acetaminophen **OR** acetaminophen **OR** acetaminophen, LORazepam **OR** LORazepam **OR** LORazepam, melatonin, metoprolol, morphine, ondansetron, polyethylene glycol, traMADol  Results for orders placed or performed during the hospital encounter of 02/28/25 (from the past 24 hours)   Lactate   Result Value Ref Range    Lactate 2.7 (H) 0.4 - 2.0 mmol/L   Urinalysis with Reflex Culture and Microscopic   Result Value Ref Range    Color,  Urine Light-Yellow Light-Yellow, Yellow, Dark-Yellow    Appearance, Urine Clear Clear    Specific Gravity, Urine 1.030 1.005 - 1.035    pH, Urine 6.5 5.0, 5.5, 6.0, 6.5, 7.0, 7.5, 8.0    Protein, Urine NEGATIVE NEGATIVE, 10 (TRACE), 20 (TRACE) mg/dL    Glucose, Urine Normal Normal mg/dL    Blood, Urine NEGATIVE NEGATIVE mg/dL    Ketones, Urine 20 (1+) (A) NEGATIVE mg/dL    Bilirubin, Urine NEGATIVE NEGATIVE mg/dL    Urobilinogen, Urine Normal Normal mg/dL    Nitrite, Urine NEGATIVE NEGATIVE    Leukocyte Esterase, Urine NEGATIVE NEGATIVE   Electrolyte Panel, Urine   Result Value Ref Range    Sodium, Urine Random 175 mmol/L    Sodium/Creatinine Ratio 179 Not established. mmol/g Creat    Potassium, Urine Random 60 mmol/L    Potassium/Creatinine Ratio 61 Not established mmol/g Creat    Chloride, Urine Random 195 mmol/L    Chloride/Creatinine Ratio 200 23 - 275 mmol/g creat    Creatinine, Urine Random 97.6 20.0 - 370.0 mg/dL   Protein, Urine Random   Result Value Ref Range    Total Protein, Urine Random 12 5 - 25 mg/dL    Creatinine, Urine Random 97.6 20.0 - 370.0 mg/dL    T. Protein/Creatinine Ratio 0.12 0.00 - 0.17 mg/mg Creat   Protein, Total   Result Value Ref Range    Total Protein 6.7 6.4 - 8.2 g/dL   Lactate   Result Value Ref Range    Lactate 0.8 0.4 - 2.0 mmol/L   Creatine Kinase   Result Value Ref Range    Creatine Kinase 110 0 - 325 U/L   Myoglobin   Result Value Ref Range    Myoglobin 68 <=92 ug/L   Parathyroid Hormone, Intact   Result Value Ref Range    Parathyroid Hormone, Intact 94.0 (H) 18.5 - 88.0 pg/mL   Thyroid Stimulating Hormone   Result Value Ref Range    Thyroid Stimulating Hormone 0.38 (L) 0.44 - 3.98 mIU/L   Cortisol   Result Value Ref Range    Cortisol 12.9 2.5 - 20.0 ug/dL   Calcium, ionized   Result Value Ref Range    POCT Calcium, Ionized 1.44 (H) 1.1 - 1.33 mmol/L         Assessment/Plan              Assessment & Plan  Fall, initial encounter    Hypercalcemia    Fall    Elevated lactic acid  level    Tibial plateau fracture, left    Lipohemarthrosis      60 y.o. male   PMH; esophageal varices, hypothyroidism, HLP, liver cirrhosis.   Patient is presenting with left knee pain.  He reportedly fell on steps last night when he was intoxicated.  He denies loss of consciousness but reportedly did hit his head.  [place] [facility] [place] demonstrating a lateral tibial plateau fracture.  Orthopedics was consulted.  The patient was subsequently admitted to the hospital.  He has undergone previous surgery on this knee with an ACL reconstruction and some form of meniscal surgery back in 2014.      // Left lateral tibial plateau fracture  Ortho is on consult.   PT/OT        // Hypercalcemia  - Albumin;   - Ionized Ca; 1.44  - PTH; 94  - 25D;   - 1,25D;   - Phos; 2.3  - Check K/L  - Check SPEP with IF  - IVF;   TSH and cortisol, not significant.       Kelvin Cuellar MD

## 2025-03-02 ENCOUNTER — APPOINTMENT (OUTPATIENT)
Dept: RADIOLOGY | Facility: HOSPITAL | Age: 60
End: 2025-03-02
Payer: COMMERCIAL

## 2025-03-02 LAB
ANION GAP SERPL CALC-SCNC: 13 MMOL/L (ref 10–20)
BUN SERPL-MCNC: 12 MG/DL (ref 6–23)
CALCIUM SERPL-MCNC: 11.2 MG/DL (ref 8.6–10.3)
CHLORIDE SERPL-SCNC: 105 MMOL/L (ref 98–107)
CO2 SERPL-SCNC: 24 MMOL/L (ref 21–32)
CREAT SERPL-MCNC: 0.71 MG/DL (ref 0.5–1.3)
EGFRCR SERPLBLD CKD-EPI 2021: >90 ML/MIN/1.73M*2
ERYTHROCYTE [DISTWIDTH] IN BLOOD BY AUTOMATED COUNT: 12.9 % (ref 11.5–14.5)
GLUCOSE SERPL-MCNC: 93 MG/DL (ref 74–99)
HCT VFR BLD AUTO: 40.9 % (ref 41–52)
HGB BLD-MCNC: 14.7 G/DL (ref 13.5–17.5)
MCH RBC QN AUTO: 33.6 PG (ref 26–34)
MCHC RBC AUTO-ENTMCNC: 35.9 G/DL (ref 32–36)
MCV RBC AUTO: 94 FL (ref 80–100)
NRBC BLD-RTO: 0 /100 WBCS (ref 0–0)
PLATELET # BLD AUTO: 282 X10*3/UL (ref 150–450)
POTASSIUM SERPL-SCNC: 3.7 MMOL/L (ref 3.5–5.3)
RBC # BLD AUTO: 4.37 X10*6/UL (ref 4.5–5.9)
SODIUM SERPL-SCNC: 138 MMOL/L (ref 136–145)
WBC # BLD AUTO: 8 X10*3/UL (ref 4.4–11.3)

## 2025-03-02 PROCEDURE — 2500000001 HC RX 250 WO HCPCS SELF ADMINISTERED DRUGS (ALT 637 FOR MEDICARE OP): Performed by: NURSE PRACTITIONER

## 2025-03-02 PROCEDURE — 1200000002 HC GENERAL ROOM WITH TELEMETRY DAILY

## 2025-03-02 PROCEDURE — 2500000002 HC RX 250 W HCPCS SELF ADMINISTERED DRUGS (ALT 637 FOR MEDICARE OP, ALT 636 FOR OP/ED): Performed by: NURSE PRACTITIONER

## 2025-03-02 PROCEDURE — 36415 COLL VENOUS BLD VENIPUNCTURE: CPT | Performed by: NURSE PRACTITIONER

## 2025-03-02 PROCEDURE — 99291 CRITICAL CARE FIRST HOUR: CPT | Performed by: STUDENT IN AN ORGANIZED HEALTH CARE EDUCATION/TRAINING PROGRAM

## 2025-03-02 PROCEDURE — 2500000004 HC RX 250 GENERAL PHARMACY W/ HCPCS (ALT 636 FOR OP/ED): Performed by: NURSE PRACTITIONER

## 2025-03-02 PROCEDURE — 73564 X-RAY EXAM KNEE 4 OR MORE: CPT | Mod: RT

## 2025-03-02 PROCEDURE — 97535 SELF CARE MNGMENT TRAINING: CPT | Mod: GO,CO

## 2025-03-02 PROCEDURE — 85027 COMPLETE CBC AUTOMATED: CPT | Performed by: NURSE PRACTITIONER

## 2025-03-02 PROCEDURE — 80048 BASIC METABOLIC PNL TOTAL CA: CPT | Performed by: NURSE PRACTITIONER

## 2025-03-02 PROCEDURE — 73564 X-RAY EXAM KNEE 4 OR MORE: CPT | Mod: RIGHT SIDE | Performed by: RADIOLOGY

## 2025-03-02 RX ORDER — OXYCODONE HYDROCHLORIDE 5 MG/1
5 TABLET ORAL EVERY 4 HOURS PRN
Status: DISCONTINUED | OUTPATIENT
Start: 2025-03-02 | End: 2025-03-06

## 2025-03-02 RX ORDER — SODIUM CHLORIDE 9 MG/ML
125 INJECTION, SOLUTION INTRAVENOUS CONTINUOUS
Status: DISCONTINUED | OUTPATIENT
Start: 2025-03-02 | End: 2025-03-03

## 2025-03-02 RX ORDER — MORPHINE SULFATE 2 MG/ML
2 INJECTION, SOLUTION INTRAMUSCULAR; INTRAVENOUS EVERY 4 HOURS PRN
Status: COMPLETED | OUTPATIENT
Start: 2025-03-02 | End: 2025-03-07

## 2025-03-02 RX ORDER — ACETAMINOPHEN 325 MG/1
975 TABLET ORAL EVERY 8 HOURS
Status: DISCONTINUED | OUTPATIENT
Start: 2025-03-02 | End: 2025-03-08 | Stop reason: HOSPADM

## 2025-03-02 RX ADMIN — TRAMADOL HYDROCHLORIDE 50 MG: 50 TABLET, FILM COATED ORAL at 04:40

## 2025-03-02 RX ADMIN — PANTOPRAZOLE SODIUM 40 MG: 40 TABLET, DELAYED RELEASE ORAL at 06:32

## 2025-03-02 RX ADMIN — Medication 1 TABLET: at 10:37

## 2025-03-02 RX ADMIN — ACETAMINOPHEN 325MG 975 MG: 325 TABLET ORAL at 21:25

## 2025-03-02 RX ADMIN — PREDNISONE 20 MG: 20 TABLET ORAL at 10:37

## 2025-03-02 RX ADMIN — TRAMADOL HYDROCHLORIDE 50 MG: 50 TABLET, FILM COATED ORAL at 10:38

## 2025-03-02 RX ADMIN — THIAMINE HCL TAB 100 MG 100 MG: 100 TAB at 10:37

## 2025-03-02 RX ADMIN — DORZOLAMIDE HYDROCHLORIDE AND TIMOLOL MALEATE 1 DROP: 20; 5 SOLUTION/ DROPS OPHTHALMIC at 21:25

## 2025-03-02 RX ADMIN — LEVOTHYROXINE SODIUM 150 MCG: 150 TABLET ORAL at 10:37

## 2025-03-02 RX ADMIN — ALPRAZOLAM 0.5 MG: 0.5 TABLET ORAL at 18:32

## 2025-03-02 RX ADMIN — SODIUM CHLORIDE 125 ML/HR: 9 INJECTION, SOLUTION INTRAVENOUS at 19:43

## 2025-03-02 RX ADMIN — ACETAMINOPHEN 325MG 975 MG: 325 TABLET ORAL at 13:27

## 2025-03-02 RX ADMIN — OXYCODONE HYDROCHLORIDE 5 MG: 5 TABLET ORAL at 13:27

## 2025-03-02 RX ADMIN — LATANOPROST 1 DROP: 50 SOLUTION OPHTHALMIC at 22:07

## 2025-03-02 RX ADMIN — QUETIAPINE FUMARATE 100 MG: 100 TABLET ORAL at 21:25

## 2025-03-02 RX ADMIN — AZITHROMYCIN 500 MG: 500 TABLET, FILM COATED ORAL at 10:36

## 2025-03-02 RX ADMIN — DORZOLAMIDE HYDROCHLORIDE AND TIMOLOL MALEATE 1 DROP: 20; 5 SOLUTION/ DROPS OPHTHALMIC at 10:43

## 2025-03-02 RX ADMIN — Medication 500 MG: at 06:32

## 2025-03-02 RX ADMIN — FOLIC ACID 1 MG: 1 TABLET ORAL at 10:37

## 2025-03-02 ASSESSMENT — COGNITIVE AND FUNCTIONAL STATUS - GENERAL
CLIMB 3 TO 5 STEPS WITH RAILING: A LOT
EATING MEALS: A LITTLE
MOVING TO AND FROM BED TO CHAIR: TOTAL
CLIMB 3 TO 5 STEPS WITH RAILING: A LOT
MOVING FROM LYING ON BACK TO SITTING ON SIDE OF FLAT BED WITH BEDRAILS: A LOT
MOVING TO AND FROM BED TO CHAIR: A LITTLE
TOILETING: A LITTLE
HELP NEEDED FOR BATHING: A LOT
TURNING FROM BACK TO SIDE WHILE IN FLAT BAD: A LOT
TURNING FROM BACK TO SIDE WHILE IN FLAT BAD: A LITTLE
STANDING UP FROM CHAIR USING ARMS: TOTAL
DAILY ACTIVITIY SCORE: 21
DAILY ACTIVITIY SCORE: 16
WALKING IN HOSPITAL ROOM: A LOT
MOBILITY SCORE: 16
TURNING FROM BACK TO SIDE WHILE IN FLAT BAD: A LITTLE
CLIMB 3 TO 5 STEPS WITH RAILING: TOTAL
DRESSING REGULAR LOWER BODY CLOTHING: A LITTLE
WALKING IN HOSPITAL ROOM: A LOT
HELP NEEDED FOR BATHING: A LITTLE
STANDING UP FROM CHAIR USING ARMS: A LITTLE
DAILY ACTIVITIY SCORE: 18
MOBILITY SCORE: 17
STANDING UP FROM CHAIR USING ARMS: A LITTLE
DRESSING REGULAR UPPER BODY CLOTHING: A LITTLE
TOILETING: A LOT
TOILETING: A LITTLE
PERSONAL GROOMING: A LITTLE
MOBILITY SCORE: 8
MOVING FROM LYING ON BACK TO SITTING ON SIDE OF FLAT BED WITH BEDRAILS: A LITTLE
PERSONAL GROOMING: A LITTLE
DRESSING REGULAR LOWER BODY CLOTHING: TOTAL
WALKING IN HOSPITAL ROOM: TOTAL
DRESSING REGULAR LOWER BODY CLOTHING: A LITTLE
DRESSING REGULAR UPPER BODY CLOTHING: A LITTLE
MOVING TO AND FROM BED TO CHAIR: A LITTLE

## 2025-03-02 ASSESSMENT — LIFESTYLE VARIABLES
VISUAL DISTURBANCES: NOT PRESENT
AUDITORY DISTURBANCES: NOT PRESENT
AUDITORY DISTURBANCES: NOT PRESENT
PAROXYSMAL SWEATS: NO SWEAT VISIBLE
NAUSEA AND VOMITING: NO NAUSEA AND NO VOMITING
ORIENTATION AND CLOUDING OF SENSORIUM: ORIENTED AND CAN DO SERIAL ADDITIONS
PAROXYSMAL SWEATS: NO SWEAT VISIBLE
HEADACHE, FULLNESS IN HEAD: NOT PRESENT
AGITATION: NORMAL ACTIVITY
PAROXYSMAL SWEATS: NO SWEAT VISIBLE
AGITATION: NORMAL ACTIVITY
ORIENTATION AND CLOUDING OF SENSORIUM: ORIENTED AND CAN DO SERIAL ADDITIONS
ANXIETY: NO ANXIETY, AT EASE
HEADACHE, FULLNESS IN HEAD: NOT PRESENT
HEADACHE, FULLNESS IN HEAD: NOT PRESENT
AUDITORY DISTURBANCES: NOT PRESENT
ORIENTATION AND CLOUDING OF SENSORIUM: ORIENTED AND CAN DO SERIAL ADDITIONS
TREMOR: NO TREMOR
NAUSEA AND VOMITING: NO NAUSEA AND NO VOMITING
BLOOD PRESSURE: 139/90
TOTAL SCORE: 0
ANXIETY: MILDLY ANXIOUS
TOTAL SCORE: 0
TREMOR: NO TREMOR
TREMOR: NO TREMOR
ORIENTATION AND CLOUDING OF SENSORIUM: ORIENTED AND CAN DO SERIAL ADDITIONS
PAROXYSMAL SWEATS: NO SWEAT VISIBLE
PAROXYSMAL SWEATS: NO SWEAT VISIBLE
VISUAL DISTURBANCES: NOT PRESENT
HEADACHE, FULLNESS IN HEAD: NOT PRESENT
AUDITORY DISTURBANCES: NOT PRESENT
VISUAL DISTURBANCES: NOT PRESENT
TREMOR: NO TREMOR
AGITATION: NORMAL ACTIVITY
AGITATION: NORMAL ACTIVITY
ANXIETY: NO ANXIETY, AT EASE
VISUAL DISTURBANCES: NOT PRESENT
PULSE: 116
TREMOR: NO TREMOR
NAUSEA AND VOMITING: NO NAUSEA AND NO VOMITING
HEADACHE, FULLNESS IN HEAD: NOT PRESENT
TOTAL SCORE: 0
VISUAL DISTURBANCES: NOT PRESENT
NAUSEA AND VOMITING: NO NAUSEA AND NO VOMITING
AGITATION: SOMEWHAT MORE THAN NORMAL ACTIVITY
ANXIETY: NO ANXIETY, AT EASE
TOTAL SCORE: 1
AUDITORY DISTURBANCES: NOT PRESENT
ANXIETY: NO ANXIETY, AT EASE
TOTAL SCORE: 1
ORIENTATION AND CLOUDING OF SENSORIUM: ORIENTED AND CAN DO SERIAL ADDITIONS
NAUSEA AND VOMITING: NO NAUSEA AND NO VOMITING

## 2025-03-02 ASSESSMENT — PAIN - FUNCTIONAL ASSESSMENT
PAIN_FUNCTIONAL_ASSESSMENT: 0-10

## 2025-03-02 ASSESSMENT — PAIN SCALES - GENERAL
PAINLEVEL_OUTOF10: 8
PAINLEVEL_OUTOF10: 9
PAINLEVEL_OUTOF10: 0 - NO PAIN
PAINLEVEL_OUTOF10: 0 - NO PAIN
PAINLEVEL_OUTOF10: 10 - WORST POSSIBLE PAIN
PAINLEVEL_OUTOF10: 0 - NO PAIN
PAINLEVEL_OUTOF10: 10 - WORST POSSIBLE PAIN
PAINLEVEL_OUTOF10: 0 - NO PAIN
PAINLEVEL_OUTOF10: 10 - WORST POSSIBLE PAIN
PAINLEVEL_OUTOF10: 1

## 2025-03-02 ASSESSMENT — PAIN DESCRIPTION - DESCRIPTORS: DESCRIPTORS: DISCOMFORT

## 2025-03-02 ASSESSMENT — PAIN DESCRIPTION - LOCATION: LOCATION: LEG

## 2025-03-02 ASSESSMENT — PAIN SCALES - PAIN ASSESSMENT IN ADVANCED DEMENTIA (PAINAD): TOTALSCORE: MEDICATION (SEE MAR)

## 2025-03-02 ASSESSMENT — ACTIVITIES OF DAILY LIVING (ADL): HOME_MANAGEMENT_TIME_ENTRY: 18

## 2025-03-02 ASSESSMENT — PAIN DESCRIPTION - ORIENTATION: ORIENTATION: LEFT

## 2025-03-02 NOTE — CARE PLAN
The patient's goals for the shift include      The clinical goals for the shift include patient will be free falls      Problem: Dressings Lower Extremities  Goal: STG - Patient will complete lower body dressing with mod assist with comp strategies and AE to maintain left LE WB status  Outcome: Progressing     Problem: Pain - Adult  Goal: Verbalizes/displays adequate comfort level or baseline comfort level  Outcome: Progressing     Problem: Safety - Adult  Goal: Free from fall injury  Outcome: Progressing     Problem: Discharge Planning  Goal: Discharge to home or other facility with appropriate resources  Outcome: Progressing     Problem: Chronic Conditions and Co-morbidities  Goal: Patient's chronic conditions and co-morbidity symptoms are monitored and maintained or improved  Outcome: Progressing     Problem: Pain  Goal: Takes deep breaths with improved pain control throughout the shift  Outcome: Progressing  Goal: Turns in bed with improved pain control throughout the shift  Outcome: Progressing  Goal: Walks with improved pain control throughout the shift  Outcome: Progressing  Goal: Performs ADL's with improved pain control throughout shift  Outcome: Progressing  Goal: Participates in PT with improved pain control throughout the shift  Outcome: Progressing  Goal: Free from opioid side effects throughout the shift  Outcome: Progressing  Goal: Free from acute confusion related to pain meds throughout the shift  Outcome: Progressing

## 2025-03-02 NOTE — PROGRESS NOTES
03/02/25 1053   Discharge Planning   Living Arrangements Alone   Support Systems Family members   Type of Residence Private residence   Home or Post Acute Services Post acute facilities (Rehab/SNF/etc)   Expected Discharge Disposition Rehab   Does the patient need discharge transport arranged? Yes   RoundTrip coordination needed? Yes     Met with patient and siblings at bedside. Admitted for L tibial fracture. Pt lives alone and was independent PTA with no HHC or DME. PCP is Rafael Holliday. Pt feels he is able to manage his health and understands his medications. Was able to drive and obtain. PT [facility] 12 OT 16, recommending high intensity therapy. Pt is not sure when surgery will be and if insurance will cover rehab before surgery. Plans to schedule surgery tomorrow and contact HR for FMLA. Does not want to decide on rehab until he has more answers. Pt would be able to stay with sister if he decides to go home. TCC team will follow up tomorrow.

## 2025-03-02 NOTE — ED PROCEDURE NOTE
Procedure  Critical Care    Performed by: Kenroy Lopez MD  Authorized by: Meng Phipps DO    Critical care provider statement:     Critical care time (minutes):  35    Critical care time was exclusive of:  Separately billable procedures and treating other patients and teaching time    Critical care was necessary to treat or prevent imminent or life-threatening deterioration of the following conditions:  Circulatory failure and trauma    Critical care was time spent personally by me on the following activities:  Development of treatment plan with patient or surrogate, discussions with consultants, evaluation of patient's response to treatment, examination of patient, obtaining history from patient or surrogate, ordering and performing treatments and interventions, ordering and review of laboratory studies, ordering and review of radiographic studies, pulse oximetry and re-evaluation of patient's condition               Kenroy Lopez MD  03/02/25 [id number]

## 2025-03-02 NOTE — PROGRESS NOTES
Sp Morrison is a 60 y.o. male on day 1 of admission presenting with Tibial plateau fracture, left.      Subjective   Patient is feeling well, no N/V, no CP or SOB.        Objective          Vitals 24HR  Heart Rate:  []   Temp:  [36 °C (96.8 °F)-36.9 °C (98.4 °F)]   Resp:  [16-20]   BP: (125-174)/()   SpO2:  [91 %-95 %]     Intake/Output last 3 Shifts:    Intake/Output Summary (Last 24 hours) at 3/2/2025 1318  Last data filed at 3/2/2025 0400  Gross per 24 hour   Intake 1000 ml   Output 2600 ml   Net -1600 ml       Physical Exam  GENERAL: No distress.   SKIN: No rashes or lesions.  EYES: PERRLA, EOMI  HEENT: Head: Normocephalic  Neck: supple and no adenopathy  LUNGS: Lungs clear to auscultation. Good diaphragmatic excursion.  CARDIAC: Normal S1 and S2; no rubs, murmurs, or gallops  ABDOMEN: Abdomen soft, non-tender. BS normal.   EXTREMITIES: No ulcers, Extremities normal.   NEURO: No focal deficit.     Relevant Results             Scheduled medications  acetaminophen, 975 mg, oral, q8h  ALPRAZolam, 0.5 mg, oral, Daily  azithromycin, 500 mg, oral, q24h PATRICIA  dorzolamide-timoloL, 1 drop, Both Eyes, BID  folic acid, 1 mg, oral, Daily  [Held by provider] heparin (porcine), 5,000 Units, subcutaneous, q8h  latanoprost, 1 drop, Both Eyes, Nightly  levothyroxine, 150 mcg, oral, Daily  multivitamin with minerals, 1 tablet, oral, Daily  pantoprazole, 40 mg, oral, Daily before breakfast  predniSONE, 20 mg, oral, Daily  QUEtiapine, 100 mg, oral, Nightly  thiamine, 100 mg, oral, Daily      Continuous medications       PRN medications  PRN medications: albuterol, LORazepam **OR** LORazepam **OR** LORazepam, melatonin, metoprolol, morphine, ondansetron, oxyCODONE, polyethylene glycol  Results for orders placed or performed during the hospital encounter of 02/28/25 (from the past 24 hours)   CBC   Result Value Ref Range    WBC 8.0 4.4 - 11.3 x10*3/uL    nRBC 0.0 0.0 - 0.0 /100 WBCs    RBC 4.37 (L) 4.50 - 5.90  x10*6/uL    Hemoglobin 14.7 13.5 - 17.5 g/dL    Hematocrit 40.9 (L) 41.0 - 52.0 %    MCV 94 80 - 100 fL    MCH 33.6 26.0 - 34.0 pg    MCHC 35.9 32.0 - 36.0 g/dL    RDW 12.9 11.5 - 14.5 %    Platelets 282 150 - 450 x10*3/uL   Basic metabolic panel   Result Value Ref Range    Glucose 93 74 - 99 mg/dL    Sodium 138 136 - 145 mmol/L    Potassium 3.7 3.5 - 5.3 mmol/L    Chloride 105 98 - 107 mmol/L    Bicarbonate 24 21 - 32 mmol/L    Anion Gap 13 10 - 20 mmol/L    Urea Nitrogen 12 6 - 23 mg/dL    Creatinine 0.71 0.50 - 1.30 mg/dL    eGFR >90 >60 mL/min/1.73m*2    Calcium 11.2 (H) 8.6 - 10.3 mg/dL         Assessment/Plan              Assessment & Plan  Fall, initial encounter    Hypercalcemia    Fall    Elevated lactic acid level    Tibial plateau fracture, left    Lipohemarthrosis      60 y.o. male   PMH; esophageal varices, hypothyroidism, HLP, liver cirrhosis.   Patient is presenting with left knee pain.  He reportedly fell on steps last night when he was intoxicated.  He denies loss of consciousness but reportedly did hit his head.  He was brought by EMS to [facility] where x-rays were obtained demonstrating a lateral tibial plateau fracture.  Orthopedics was consulted.  The patient was subsequently admitted to the hospital.  He has undergone previous surgery on this knee with an ACL reconstruction and some form of meniscal surgery back in 2014.      // Left lateral tibial plateau fracture  Ortho is on consult.   PT/OT     PT [facility] 12 OT 16, recommending high intensity therapy.   Plans to schedule surgery tomorrow and contact HR for FMLA.   Right knee pain, obtain x-ray, will consult with orthopedic.      // Hypercalcemia  - Albumin;   - Ionized Ca; 1.44  - PTH; 94  - 25D; 28  - 1,25D;   - Phos; 2.3  - Check K/L  - Check SPEP with IF  - IVF;   TSH and cortisol, not significant. Has hypothyro with elevated TPO.       Kelvin Cuellar MD

## 2025-03-02 NOTE — PROGRESS NOTES
"Sp Morrison is a 60 y.o. male on day 1 of admission presenting with Tibial plateau fracture, left.    Subjective   Patient resting comfortably in bed this a.m.  Knee immobilizer remains in place.  He is hopeful to be discharged soon.  He denies any additional new issues.       Objective     Physical Exam    Left lower extremity MSK exam  -Knee immobilizer in place  -Ongoing tenderness to palpation over the proximal tibia is appreciated  -Intact PF, DF, EHL function  -Sensation is intact to light touch in the left lower extremity  -DP and PT pulses are present    Last Recorded Vitals  Blood pressure 139/90, pulse (!) 116, temperature 36.8 °C (98.2 °F), temperature source Temporal, resp. rate 16, height 1.803 m (5' 11\"), weight 109 kg (240 lb), SpO2 92%.  Intake/Output last 3 Shifts:  I/O last 3 completed shifts:  In: 2480 (22.8 mL/kg) [P.O.:480; I.V.:2000 (18.4 mL/kg)]  Out: 4125 (37.9 mL/kg) [Urine:4125 (1.1 mL/kg/hr)]  Weight: 108.9 kg     Relevant Results      Scheduled medications  ALPRAZolam, 0.5 mg, oral, Daily  azithromycin, 500 mg, oral, q24h PATRICIA  dorzolamide-timoloL, 1 drop, Both Eyes, BID  folic acid, 1 mg, oral, Daily  [Held by provider] heparin (porcine), 5,000 Units, subcutaneous, q8h  latanoprost, 1 drop, Both Eyes, Nightly  levothyroxine, 150 mcg, oral, Daily  multivitamin with minerals, 1 tablet, oral, Daily  pantoprazole, 40 mg, oral, Daily before breakfast  predniSONE, 20 mg, oral, Daily  QUEtiapine, 100 mg, oral, Nightly  thiamine, 100 mg, oral, Daily      Continuous medications     PRN medications  PRN medications: acetaminophen **OR** acetaminophen **OR** acetaminophen, albuterol, LORazepam **OR** LORazepam **OR** LORazepam, melatonin, metoprolol, morphine, ondansetron, polyethylene glycol, traMADol  Results for orders placed or performed during the hospital encounter of 02/28/25 (from the past 24 hours)   CBC   Result Value Ref Range    WBC 8.0 4.4 - 11.3 x10*3/uL    nRBC 0.0 0.0 - 0.0 " /100 WBCs    RBC 4.37 (L) 4.50 - 5.90 x10*6/uL    Hemoglobin 14.7 13.5 - 17.5 g/dL    Hematocrit 40.9 (L) 41.0 - 52.0 %    MCV 94 80 - 100 fL    MCH 33.6 26.0 - 34.0 pg    MCHC 35.9 32.0 - 36.0 g/dL    RDW 12.9 11.5 - 14.5 %    Platelets 282 150 - 450 x10*3/uL   Basic metabolic panel   Result Value Ref Range    Glucose 93 74 - 99 mg/dL    Sodium 138 136 - 145 mmol/L    Potassium 3.7 3.5 - 5.3 mmol/L    Chloride 105 98 - 107 mmol/L    Bicarbonate 24 21 - 32 mmol/L    Anion Gap 13 10 - 20 mmol/L    Urea Nitrogen 12 6 - 23 mg/dL    Creatinine 0.71 0.50 - 1.30 mg/dL    eGFR >90 >60 mL/min/1.73m*2    Calcium 11.2 (H) 8.6 - 10.3 mg/dL                            Assessment/Plan   Assessment & Plan  Tibial plateau fracture, left    Hypercalcemia    Fall    Elevated lactic acid level    Lipohemarthrosis    Fall, initial encounter    Left lateral tibial plateau fracture    -Patient will require operative treatment but again this can be done in an outpatient setting  -Close follow-up will be arranged with Dr. Whitt for surgical planning  -Nonweightbearing left lower extremity in KI  -PT/OT eval and treat  -While patient may require subsequent discharge to rehab following surgery I would not recommend that at this time as patient will require rehab after surgical intervention.  From orthopedic perspective I would recommend discharge to home with family assistance prior to surgery  -Okay for discharge from orthopedic perspective  -Multimodal pain control  -Please contact me with questions         I spent 15 minutes in the professional and overall care of this patient.      Osmar Kirby MD

## 2025-03-02 NOTE — PROGRESS NOTES
Physical Therapy    Physical Therapy Treatment    Patient Name: Sp Morrison  MRN: [id number]  Today's Date: 3/2/2025  Time Calculation  Start Time: 1250  Stop Time: 1313  Time Calculation (min): 23 min     4106/4106-A    Assessment/Plan   PT Assessment  PT Assessment Results: Decreased strength, Decreased range of motion, Impaired balance, Decreased mobility, Pain  Rehab Prognosis: Good  Evaluation/Treatment Tolerance: Patient limited by pain  Medical Staff Made Aware: Yes  Strengths: Ability to acquire knowledge, Attitude of self  End of Session Communication: Bedside nurse  Assessment Comment: standing activity held until xray completed for RLE d/t new pain/inflammation  End of Session Patient Position: Bed, 3 rail up, Alarm on     PT Plan  Treatment/Interventions: Bed mobility, Transfer training, Gait training, Stair training, Strengthening, Endurance training, Therapeutic exercise, Therapeutic activity, Home exercise program  PT Plan: Ongoing PT  PT Frequency: Daily  PT Discharge Recommendations: High intensity level of continued care  Equipment Recommended upon Discharge: Wheeled walker, Wheelchair  PT Recommended Transfer Status: Assist x2  PT - OK to Discharge: Yes    Current Problem:  Patient Active Problem List   Diagnosis    Esophageal varices without bleeding, unspecified esophageal varices type (Multi)    Hyperparathyroidism (Multi)    Hypothyroidism    Anxiety    Constipation    Erectile dysfunction    GERD without esophagitis    Glaucoma    Hepatitis C virus infection without hepatic coma    History of left knee replacement    Hypercalcemia    History of thyroid disorder    Hyperlipidemia    Insomnia    Left anterior cruciate ligament tear    Recurrent genital herpes    Vitamin D deficiency    Acquired hypothyroidism    Hyperplastic polyp of large intestine    Fall    Elevated lactic acid level    Tibial plateau fracture, left    Lipohemarthrosis    Fall, initial encounter       General Visit  Information:   PT  Visit  PT Received On: 03/02/25  General  Prior to Session Communication: Bedside nurse  General Comment:  (Pt c/o new pain in R knee 10/10 with movement. inflammation noted in R lateral quad- nursing notified. decision was made to not stand this session- xray ordered--notified primary PT)  Subjective     Precautions:  Precautions  LE Weight Bearing Status: Left Non-Weight Bearing  Medical Precautions: Fall precautions (knee immobilizer)    Vital Signs:     Objective     Pain:  Pain Assessment  Pain Assessment: 0-10  0-10 (Numeric) Pain Score: 10 - Worst possible pain  Pain Type: Acute pain  Pain Location: Knee  Pain Orientation: Right  Multiple Pain Sites:  (pain also in LLE with movement 8/10)    Cognition:       Postural Control:       Extremity/Trunk Assessments:                Activity Tolerance:       Treatments:  Therapeutic Exercise  Therapeutic Exercise Performed: Yes  Therapeutic Exercise Activity 1: SLR LLE: unable without assist        Bed Mobility 1  Bed Mobility Comments 1: sit<>supine: max x2 for trunk and BLE management. once seated, able to sit self supported. lateral scooting min x1 with max cues  Ambulation/Gait Training  Ambulation/Gait Training Performed:  (all standing deferred d/t R knee pain/inflammation)             Outcome Measures:     [facility] Basic Mobility  Turning from your back to your side while in a flat bed without using bedrails: A lot  Moving from lying on your back to sitting on the side of a flat bed without using bedrails: A lot  Moving to and from bed to chair (including a wheelchair): Total  Standing up from a chair using your arms (e.g. wheelchair or bedside chair): Total  To walk in hospital room: Total  Climbing 3-5 steps with railing: Total  Basic Mobility - Total Score: 8                                      Education Documentation  No documentation found.  Education Comments  No comments found.           EDUCATION:  Outpatient Education  Education  Provided: Home Exercise Program, POC, Fall Risk, Other (positioning of KI)  Encounter Problems       Encounter Problems (Active)       Mobility       STG - Patient will ambulate X 20-40 Ft. with a w/w, CGA.         Start:  03/01/25    Expected End:  03/15/25            STG - Patient will ambulate up and down a curb/step with a walker, MIN A x1.         Start:  03/01/25    Expected End:  03/15/25            Goal 1:  DEMO a G=SLR on L.         Start:  03/01/25    Expected End:  03/15/25               PT Transfers       STG - Patient will perform bed mobility with CGA.         Start:  03/01/25    Expected End:  03/15/25            STG - Patient will transfer sit to and from stand into a w/w, CGA.         Start:  03/01/25    Expected End:  03/15/25               Pain - Adult          Safety       STG - Patient uses gait belt during all transfers, curb step trng., and amb. trng..        Start:  03/01/25    Expected End:  03/15/25

## 2025-03-02 NOTE — NURSING NOTE
"UPDATE 1925: Bedside report completed. Patient laying in bed asking about evening Seroquel, educated on medication regimine and scheduled times. No complaints of pain. CIWA assessment completed. Bed in low and locked position. Bed alarm active and call bell within reach.    UPDATE  2019: Educated patient on pain medications if needed, patient stated \" I don't need now, I really don't want to take anything only Tylenol\", patient asked if would like Tylenol. Patient stated \"No\". Requested Eye drops from pharmacy.     UPDATE 0033: Patient sleeping when vitals obtain, CIWA score = 0. Will continue to monitor.     UPDATE 0441: Patient requesting something for pain, Tramadol administered.  "

## 2025-03-02 NOTE — PROGRESS NOTES
Occupational Therapy    Occupational Therapy    OT Treatment    Patient Name: pS Morrison  MRN: [id number]  Today's Date: 3/2/2025  Time Calculation  Start Time: 1249  Stop Time: 1307  Time Calculation (min): 18 min       4106/4106-A    Assessment:  End of Session Communication: Bedside nurse  End of Session Patient Position: Bed, 3 rail up, Alarm on       Plan:  OT Frequency: 5 times per week  OT Discharge Recommendations: High intensity level of continued care     Subjective        03/02/25 1249   OT Last Visit   OT Received On 03/02/25   General   Past Medical History Relevant to Rehab Admitted 2/28/2025 after falling down 8 steps.  Xray left knee (+) lateral tibial plateau fracture.  Ortho consult completed and (-) surgical intervention.  Patient to wear immobilizer and maintain NWB.   Family/Caregiver Present No   Co-Treatment PT   Co-Treatment Reason for safety   Prior to Session Communication Bedside nurse   Patient Position Received Bed, 3 rail up;Alarm on   Preferred Learning Style verbal;visual   General Comment Patient found supine in bed and agreeable to therapy upon arrival. Pt pleasant and cooperative. Pt c/o new pain in R knee, R knee appearing very swollen, pt rating pain 10/10. Once seated EOB, RN in to look at knee, advised not to stand with pt at this time. RN notified that pt will have new xray on R leg.    Precautions   LE Weight Bearing Status Left Non-Weight Bearing   Medical Precautions Fall precautions   Pain Assessment   Pain Assessment 0-10   0-10 (Numeric) Pain Score 10 - Worst possible pain   Pain Type Acute pain   Pain Location Knee   Pain Orientation Right   Bed Mobility   Bed Mobility Yes   Bed Mobility 1   Bed Mobility 1 Supine to sitting;Sitting to supine   Level of Assistance 1 Maximum assistance;+2   Bed Mobility Comments 1 Supine<->sit EOB with Max X 2 to transition B LE in and out of bed and trunk to and from upright position. Once seated EOB and properly positioned, pt  maintains balance with CS. Min A for lateral scoot toward HOB.  (use of rail supine->sit)   IP OT Assessment   End of Session Communication Bedside nurse   End of Session Patient Position Bed, 3 rail up;Alarm on   Education   Individual(s) Educated Patient   Education Comment Education provided on L NWB.   Inpatient Plan   OT Frequency 5 times per week   OT Discharge Recommendations High intensity level of continued care       Outcome Measures:[facility] Daily Activity  Putting on and taking off regular lower body clothing: Total  Bathing (including washing, rinsing, drying): A lot  Putting on and taking off regular upper body clothing: A little  Toileting, which includes using toilet, bedpan or urinal: A lot  Taking care of personal grooming such as brushing teeth: None  Eating Meals: None  Daily Activity - Total Score: 16      Education Documentation  No documentation found.  Education Comments  No comments found.            Goals:  Encounter Problems       Encounter Problems (Active)       Dressings Lower Extremities       STG - Patient will complete lower body dressing with mod assist with comp strategies and AE to maintain left LE WB status (Progressing)       Start:  03/01/25    Expected End:  03/15/25               Functional Balance       STG-Patient will be min assist with assistive device dynamic stand task >3 minutes for ADL completion maintaining left LE WB status (Progressing)       Start:  03/01/25    Expected End:  03/15/25               Functional Mobility       STG-Patient will be min assist with assistive device functional mobility tasks  maintaining left LE WB status (Progressing)       Start:  03/01/25    Expected End:  03/15/25               OT Transfers       STG-Patient will be min assist with functional transfers demonstrating good safety  (maintaining left LE WB status) (Progressing)       Start:  03/01/25    Expected End:  03/15/25               Safety       STG-Patient will independently  verbalize left LE WB precautions with all functional tasks   (Progressing)       Start:  03/01/25    Expected End:  03/15/25

## 2025-03-03 ENCOUNTER — TELEPHONE (OUTPATIENT)
Dept: PRIMARY CARE | Facility: CLINIC | Age: 60
End: 2025-03-03
Payer: COMMERCIAL

## 2025-03-03 LAB
1,25(OH)2D SERPL-MCNC: 87.9 PG/ML (ref 19.9–79.3)
ANION GAP SERPL CALC-SCNC: 11 MMOL/L (ref 10–20)
BUN SERPL-MCNC: 17 MG/DL (ref 6–23)
CALCIUM SERPL-MCNC: 10.8 MG/DL (ref 8.6–10.3)
CHLORIDE SERPL-SCNC: 107 MMOL/L (ref 98–107)
CO2 SERPL-SCNC: 26 MMOL/L (ref 21–32)
CREAT SERPL-MCNC: 0.71 MG/DL (ref 0.5–1.3)
EGFRCR SERPLBLD CKD-EPI 2021: >90 ML/MIN/1.73M*2
ERYTHROCYTE [DISTWIDTH] IN BLOOD BY AUTOMATED COUNT: 13 % (ref 11.5–14.5)
GLUCOSE SERPL-MCNC: 85 MG/DL (ref 74–99)
HCT VFR BLD AUTO: 38.9 % (ref 41–52)
HGB BLD-MCNC: 12.7 G/DL (ref 13.5–17.5)
MCH RBC QN AUTO: 31.3 PG (ref 26–34)
MCHC RBC AUTO-ENTMCNC: 32.6 G/DL (ref 32–36)
MCV RBC AUTO: 96 FL (ref 80–100)
NRBC BLD-RTO: 0 /100 WBCS (ref 0–0)
PLATELET # BLD AUTO: 258 X10*3/UL (ref 150–450)
POTASSIUM SERPL-SCNC: 3.8 MMOL/L (ref 3.5–5.3)
RBC # BLD AUTO: 4.06 X10*6/UL (ref 4.5–5.9)
SODIUM SERPL-SCNC: 140 MMOL/L (ref 136–145)
WBC # BLD AUTO: 7.2 X10*3/UL (ref 4.4–11.3)

## 2025-03-03 PROCEDURE — 36415 COLL VENOUS BLD VENIPUNCTURE: CPT | Performed by: NURSE PRACTITIONER

## 2025-03-03 PROCEDURE — 97535 SELF CARE MNGMENT TRAINING: CPT | Mod: GO,CO

## 2025-03-03 PROCEDURE — 2500000004 HC RX 250 GENERAL PHARMACY W/ HCPCS (ALT 636 FOR OP/ED): Performed by: NURSE PRACTITIONER

## 2025-03-03 PROCEDURE — 80048 BASIC METABOLIC PNL TOTAL CA: CPT | Performed by: NURSE PRACTITIONER

## 2025-03-03 PROCEDURE — 97530 THERAPEUTIC ACTIVITIES: CPT | Mod: GP,CQ

## 2025-03-03 PROCEDURE — 1100000001 HC PRIVATE ROOM DAILY

## 2025-03-03 PROCEDURE — 85027 COMPLETE CBC AUTOMATED: CPT | Performed by: NURSE PRACTITIONER

## 2025-03-03 PROCEDURE — 2500000001 HC RX 250 WO HCPCS SELF ADMINISTERED DRUGS (ALT 637 FOR MEDICARE OP): Performed by: NURSE PRACTITIONER

## 2025-03-03 PROCEDURE — 2500000002 HC RX 250 W HCPCS SELF ADMINISTERED DRUGS (ALT 637 FOR MEDICARE OP, ALT 636 FOR OP/ED): Performed by: INTERNAL MEDICINE

## 2025-03-03 PROCEDURE — 2500000002 HC RX 250 W HCPCS SELF ADMINISTERED DRUGS (ALT 637 FOR MEDICARE OP, ALT 636 FOR OP/ED): Performed by: NURSE PRACTITIONER

## 2025-03-03 RX ORDER — CINACALCET 30 MG/1
30 TABLET, FILM COATED ORAL
Status: DISCONTINUED | OUTPATIENT
Start: 2025-03-03 | End: 2025-03-08 | Stop reason: HOSPADM

## 2025-03-03 RX ADMIN — ACETAMINOPHEN 325MG 975 MG: 325 TABLET ORAL at 06:11

## 2025-03-03 RX ADMIN — LEVOTHYROXINE SODIUM 150 MCG: 150 TABLET ORAL at 08:43

## 2025-03-03 RX ADMIN — PREDNISONE 20 MG: 20 TABLET ORAL at 08:44

## 2025-03-03 RX ADMIN — OXYCODONE HYDROCHLORIDE 5 MG: 5 TABLET ORAL at 20:38

## 2025-03-03 RX ADMIN — ACETAMINOPHEN 325MG 975 MG: 325 TABLET ORAL at 21:43

## 2025-03-03 RX ADMIN — PANTOPRAZOLE SODIUM 40 MG: 40 TABLET, DELAYED RELEASE ORAL at 06:11

## 2025-03-03 RX ADMIN — LATANOPROST 1 DROP: 50 SOLUTION OPHTHALMIC at 20:38

## 2025-03-03 RX ADMIN — Medication 1 TABLET: at 08:44

## 2025-03-03 RX ADMIN — ALPRAZOLAM 0.5 MG: 0.5 TABLET ORAL at 20:38

## 2025-03-03 RX ADMIN — QUETIAPINE FUMARATE 100 MG: 100 TABLET ORAL at 20:38

## 2025-03-03 RX ADMIN — OXYCODONE HYDROCHLORIDE 5 MG: 5 TABLET ORAL at 15:14

## 2025-03-03 RX ADMIN — OXYCODONE HYDROCHLORIDE 5 MG: 5 TABLET ORAL at 02:25

## 2025-03-03 RX ADMIN — ACETAMINOPHEN 325MG 975 MG: 325 TABLET ORAL at 14:52

## 2025-03-03 RX ADMIN — DORZOLAMIDE HYDROCHLORIDE AND TIMOLOL MALEATE 1 DROP: 20; 5 SOLUTION/ DROPS OPHTHALMIC at 08:44

## 2025-03-03 RX ADMIN — CINACALCET HYDROCHLORIDE 30 MG: 30 TABLET, FILM COATED ORAL at 12:45

## 2025-03-03 RX ADMIN — AZITHROMYCIN 500 MG: 500 TABLET, FILM COATED ORAL at 08:43

## 2025-03-03 RX ADMIN — FOLIC ACID 1 MG: 1 TABLET ORAL at 08:44

## 2025-03-03 RX ADMIN — THIAMINE HCL TAB 100 MG 100 MG: 100 TAB at 08:43

## 2025-03-03 ASSESSMENT — COGNITIVE AND FUNCTIONAL STATUS - GENERAL
TOILETING: A LOT
MOVING TO AND FROM BED TO CHAIR: TOTAL
DAILY ACTIVITIY SCORE: 16
HELP NEEDED FOR BATHING: A LITTLE
CLIMB 3 TO 5 STEPS WITH RAILING: TOTAL
HELP NEEDED FOR BATHING: A LOT
DRESSING REGULAR LOWER BODY CLOTHING: TOTAL
WALKING IN HOSPITAL ROOM: TOTAL
TURNING FROM BACK TO SIDE WHILE IN FLAT BAD: A LOT
MOBILITY SCORE: 13
DRESSING REGULAR UPPER BODY CLOTHING: A LITTLE
DRESSING REGULAR UPPER BODY CLOTHING: A LOT
MOBILITY SCORE: 8
PERSONAL GROOMING: A LITTLE
MOVING FROM LYING ON BACK TO SITTING ON SIDE OF FLAT BED WITH BEDRAILS: A LITTLE
MOVING FROM LYING ON BACK TO SITTING ON SIDE OF FLAT BED WITH BEDRAILS: A LOT
DAILY ACTIVITIY SCORE: 16
STANDING UP FROM CHAIR USING ARMS: TOTAL
MOVING TO AND FROM BED TO CHAIR: A LOT
CLIMB 3 TO 5 STEPS WITH RAILING: A LOT
WALKING IN HOSPITAL ROOM: A LOT
TURNING FROM BACK TO SIDE WHILE IN FLAT BAD: A LOT
STANDING UP FROM CHAIR USING ARMS: A LOT
TOILETING: A LOT
DRESSING REGULAR LOWER BODY CLOTHING: A LOT

## 2025-03-03 ASSESSMENT — PAIN DESCRIPTION - ORIENTATION
ORIENTATION: RIGHT
ORIENTATION: LEFT

## 2025-03-03 ASSESSMENT — LIFESTYLE VARIABLES
HEADACHE, FULLNESS IN HEAD: NOT PRESENT
TOTAL SCORE: 0
AGITATION: NORMAL ACTIVITY
ANXIETY: NO ANXIETY, AT EASE
TOTAL SCORE: 0
HEADACHE, FULLNESS IN HEAD: NOT PRESENT
VISUAL DISTURBANCES: NOT PRESENT
ORIENTATION AND CLOUDING OF SENSORIUM: ORIENTED AND CAN DO SERIAL ADDITIONS
VISUAL DISTURBANCES: NOT PRESENT
ANXIETY: NO ANXIETY, AT EASE
NAUSEA AND VOMITING: NO NAUSEA AND NO VOMITING
VISUAL DISTURBANCES: NOT PRESENT
TREMOR: NO TREMOR
PAROXYSMAL SWEATS: NO SWEAT VISIBLE
HEADACHE, FULLNESS IN HEAD: NOT PRESENT
AGITATION: NORMAL ACTIVITY
HEADACHE, FULLNESS IN HEAD: NOT PRESENT
AUDITORY DISTURBANCES: NOT PRESENT
TREMOR: NO TREMOR
AUDITORY DISTURBANCES: NOT PRESENT
ORIENTATION AND CLOUDING OF SENSORIUM: ORIENTED AND CAN DO SERIAL ADDITIONS
AUDITORY DISTURBANCES: NOT PRESENT
ORIENTATION AND CLOUDING OF SENSORIUM: ORIENTED AND CAN DO SERIAL ADDITIONS
TOTAL SCORE: 1
VISUAL DISTURBANCES: NOT PRESENT
TOTAL SCORE: 0
ORIENTATION AND CLOUDING OF SENSORIUM: ORIENTED AND CAN DO SERIAL ADDITIONS
AUDITORY DISTURBANCES: NOT PRESENT
NAUSEA AND VOMITING: NO NAUSEA AND NO VOMITING
ANXIETY: NO ANXIETY, AT EASE
PAROXYSMAL SWEATS: NO SWEAT VISIBLE
NAUSEA AND VOMITING: NO NAUSEA AND NO VOMITING
ANXIETY: NO ANXIETY, AT EASE
TREMOR: NO TREMOR
AGITATION: NORMAL ACTIVITY
AGITATION: NORMAL ACTIVITY
PAROXYSMAL SWEATS: BARELY PERCEPTIBLE SWEATING, PALMS MOIST
PAROXYSMAL SWEATS: NO SWEAT VISIBLE
TREMOR: NO TREMOR
NAUSEA AND VOMITING: NO NAUSEA AND NO VOMITING

## 2025-03-03 ASSESSMENT — ACTIVITIES OF DAILY LIVING (ADL): HOME_MANAGEMENT_TIME_ENTRY: 34

## 2025-03-03 ASSESSMENT — PAIN - FUNCTIONAL ASSESSMENT
PAIN_FUNCTIONAL_ASSESSMENT: 0-10

## 2025-03-03 ASSESSMENT — PAIN SCALES - GENERAL
PAINLEVEL_OUTOF10: 0 - NO PAIN
PAINLEVEL_OUTOF10: 7
PAINLEVEL_OUTOF10: 8
PAINLEVEL_OUTOF10: 2
PAINLEVEL_OUTOF10: 5 - MODERATE PAIN
PAINLEVEL_OUTOF10: 5 - MODERATE PAIN
PAINLEVEL_OUTOF10: 10 - WORST POSSIBLE PAIN
PAINLEVEL_OUTOF10: 6
PAINLEVEL_OUTOF10: 5 - MODERATE PAIN
PAINLEVEL_OUTOF10: 5 - MODERATE PAIN
PAINLEVEL_OUTOF10: 10 - WORST POSSIBLE PAIN
PAINLEVEL_OUTOF10: 8

## 2025-03-03 ASSESSMENT — PAIN DESCRIPTION - LOCATION: LOCATION: KNEE

## 2025-03-03 ASSESSMENT — PAIN SCALES - PAIN ASSESSMENT IN ADVANCED DEMENTIA (PAINAD): TOTALSCORE: MEDICATION (SEE MAR)

## 2025-03-03 NOTE — TELEPHONE ENCOUNTER
Patient took a fall on 2/28/25 broken tibia currently in [facility] being released to SNF and has a scheduled appointment with orthopedic surgery on 3/6/25 to schedule patient surgery.

## 2025-03-03 NOTE — PROGRESS NOTES
03/03/25 1122   Discharge Planning   Living Arrangements Alone   Support Systems Family members   Type of Residence Private residence   Home or Post Acute Services Post acute facilities (Rehab/SNF/etc)   Type of Post Acute Facility Services Rehab   Expected Discharge Disposition Rehab   Does the patient need discharge transport arranged? Yes   RoundTrip coordination needed? Yes   Patient Choice   Provider Choice list and CMS website (https://medicare.gov/care-compare#search) for post-acute Quality and Resource Measure Data were provided and reviewed with: Patient     Requested [facility] send referral to [facility]. If accepted, pt will need precert.     1427 [facility] unable to accept patient. Can reevaluate after pt has his surgery. Pt has not spoken to orthopedic associates for follow up and to schedule outpatient surgery. Plans to discharge to sisters home.     3924 Per pt request, [facility] paperwork faxed to Dr Holliday at [phone].

## 2025-03-03 NOTE — PROGRESS NOTES
03/03/25 1135   Discharge Planning   Living Arrangements Alone   Support Systems Family members   Type of Residence Private residence   Home or Post Acute Services Post acute facilities (Rehab/SNF/etc)   Type of Post Acute Facility Services Rehab   Expected Discharge Disposition Rehab     Reviewed chart and met with pt to offer CD resources. Pt denies dependency on ETOH. Explaining he only drinks socially, usually 1x per week. Pt declined the need for resources.   We reviewed his discharge options. Pt was considering going home with his sisters, but now feels Acute rehab would be a better option. He is requesting [facility] Acute rehab. [facility] updated and referral was sent.

## 2025-03-03 NOTE — PROGRESS NOTES
Sp Morrison is a 60 y.o. male on day 2 of admission presenting with Tibial plateau fracture, left.      Subjective   Patient is feeling well, no N/V, no CP or SOB.        Objective          Vitals 24HR  Heart Rate:  []   Temp:  [36 °C (96.8 °F)-36.7 °C (98.1 °F)]   Resp:  [16]   BP: ()/(60-99)   SpO2:  [91 %-96 %]     Intake/Output last 3 Shifts:    Intake/Output Summary (Last 24 hours) at 3/3/2025 0848  Last data filed at 3/3/2025 0636  Gross per 24 hour   Intake 1480 ml   Output 700 ml   Net 780 ml       Physical Exam  GENERAL: No distress.   SKIN: No rashes or lesions.  EYES: PERRLA, EOMI  HEENT: Head: Normocephalic  Neck: supple and no adenopathy  LUNGS: Lungs clear to auscultation. Good diaphragmatic excursion.  CARDIAC: Normal S1 and S2; no rubs, murmurs, or gallops  ABDOMEN: Abdomen soft, non-tender. BS normal.   EXTREMITIES: No ulcers, Extremities normal.   NEURO: No focal deficit.     Relevant Results             Scheduled medications  acetaminophen, 975 mg, oral, q8h  ALPRAZolam, 0.5 mg, oral, Daily  dorzolamide-timoloL, 1 drop, Both Eyes, BID  folic acid, 1 mg, oral, Daily  [Held by provider] heparin (porcine), 5,000 Units, subcutaneous, q8h  latanoprost, 1 drop, Both Eyes, Nightly  levothyroxine, 150 mcg, oral, Daily  multivitamin with minerals, 1 tablet, oral, Daily  pantoprazole, 40 mg, oral, Daily before breakfast  predniSONE, 20 mg, oral, Daily  QUEtiapine, 100 mg, oral, Nightly  thiamine, 100 mg, oral, Daily      Continuous medications  sodium chloride 0.9%, 125 mL/hr, Last Rate: 125 mL/hr (03/03/25 0313)        PRN medications  PRN medications: albuterol, LORazepam **OR** LORazepam **OR** LORazepam, melatonin, metoprolol, morphine, ondansetron, oxyCODONE, polyethylene glycol  Results for orders placed or performed during the hospital encounter of 02/28/25 (from the past 24 hours)   CBC   Result Value Ref Range    WBC 7.2 4.4 - 11.3 x10*3/uL    nRBC 0.0 0.0 - 0.0 /100 WBCs    RBC  4.06 (L) 4.50 - 5.90 x10*6/uL    Hemoglobin 12.7 (L) 13.5 - 17.5 g/dL    Hematocrit 38.9 (L) 41.0 - 52.0 %    MCV 96 80 - 100 fL    MCH 31.3 26.0 - 34.0 pg    MCHC 32.6 32.0 - 36.0 g/dL    RDW 13.0 11.5 - 14.5 %    Platelets 258 150 - 450 x10*3/uL   Basic metabolic panel   Result Value Ref Range    Glucose 85 74 - 99 mg/dL    Sodium 140 136 - 145 mmol/L    Potassium 3.8 3.5 - 5.3 mmol/L    Chloride 107 98 - 107 mmol/L    Bicarbonate 26 21 - 32 mmol/L    Anion Gap 11 10 - 20 mmol/L    Urea Nitrogen 17 6 - 23 mg/dL    Creatinine 0.71 0.50 - 1.30 mg/dL    eGFR >90 >60 mL/min/1.73m*2    Calcium 10.8 (H) 8.6 - 10.3 mg/dL         Assessment/Plan              Assessment & Plan  Fall, initial encounter    Hypercalcemia    Fall    Elevated lactic acid level    Tibial plateau fracture, left    Lipohemarthrosis      60 y.o. male   PMH; esophageal varices, hypothyroidism, HLP, liver cirrhosis.   Patient is presenting with left knee pain.  He reportedly fell on steps last night when he was intoxicated.  He denies loss of consciousness but reportedly did hit his head.  He was brought by EMS to [facility] where x-rays were obtained demonstrating a lateral tibial plateau fracture.  Orthopedics was consulted.  The patient was subsequently admitted to the hospital.  He has undergone previous surgery on this knee with an ACL reconstruction and some form of meniscal surgery back in 2014.      // Left lateral tibial plateau fracture  Ortho is on consult.   PT/OT     PT [facility] 12 OT 16, recommending high intensity therapy.   Right knee pain, obtain x-ray, will consult with orthopedic.        // Hypercalcemia  // Hyperparathyroidism.   - Ionized Ca; 1.44  - PTH; 94  - 25D; 28  - 1,25D; 87  - Phos; 2.3  - Check K/L  - Check SPEP with IF  - IVF;   TSH and cortisol, not significant. Has hypothyro with elevated TPO.   Will start cinacalcet 30 every day       Kelvin Cuellar MD

## 2025-03-03 NOTE — PROGRESS NOTES
Occupational Therapy    Occupational Therapy    OT Treatment    Patient Name: Sp Morrison  MRN: [id number]  Today's Date: 3/3/2025          4106/4106-A    Assessment:  End of Session Communication: Bedside nurse  End of Session Patient Position: Bed, 3 rail up, Alarm on       Plan:  OT Frequency: 5 times per week  OT Discharge Recommendations: High intensity level of continued care     Subjective      03/03/25 1037   OT Last Visit   OT Received On 03/03/25   General   Reason for Referral Fall L Lateral Tibial Plateau Fx   Referred By Kelvin Cuellar MD   Past Medical History Relevant to Rehab Admitted 2/28/2025 after falling down 8 steps.  Xray left knee (+) lateral tibial plateau fracture.  Ortho consult completed and (-) surgical intervention.  Patient to wear immobilizer and maintain NWB.   Prior to Session Communication Bedside nurse   Patient Position Received Bed, 3 rail up   General Comment Pt agreeable to tx, cleared for participation.   Precautions   LE Weight Bearing Status Left Non-Weight Bearing, L knee immobilizer    Medical Precautions Fall precautions   Pain Assessment   Pain Assessment 0-10   0-10 (Numeric) Pain Score 10 - Worst possible pain   Pain Type Acute pain   Pain Location Knee   Pain Orientation Right, left    Pain Interventions Rest;Repositioned;Cold applied   Cognition   Orientation Level Oriented X4   Grooming   Grooming Level of Assistance Close supervision   Grooming Where Assessed Bed level   Grooming Comments Pt completed grooming tasks while seated at EOB.   UE Dressing   UE Dressing Level of Assistance Close supervision   UE Dressing Where Assessed Edge of bed   UE Dressing Comments Pt doffed/donned gown while seated.   Bed Mobility 1   Bed Mobility 1 Supine to sitting   Level of Assistance 1 Minimum assistance;Moderate assistance;+2   Bed Mobility Comments 1 HOB elevated, assist needed to bring trunk to upright position.   Bed Mobility 2   Bed Mobility  2 Sitting to supine    Level of Assistance 2 Minimum assistance;+2   Transfer 1   Technique 1 Sit to stand;Stand to sit   Transfer Device 1 Walker;Gait belt   Transfer Level of Assistance 1 Max A x2    Trials/Comments 1 Pt attempted STS at EOB x4, unable d/t increased R knee pain and L LE NWB.    IP OT Assessment   End of Session Communication Bedside nurse   End of Session Patient Position Bed, 3 rail up;Alarm on   Inpatient Plan   OT Frequency 5 times per week   OT Discharge Recommendations High intensity level of continued care     Outcome Measures:[facility] Daily Activity  Putting on and taking off regular lower body clothing: Total  Bathing (including washing, rinsing, drying): A lot  Putting on and taking off regular upper body clothing: A little  Toileting, which includes using toilet, bedpan or urinal: A lot  Taking care of personal grooming such as brushing teeth: None  Eating Meals: None  Daily Activity - Total Score: 16  Education Documentation  No documentation found.  Education Comments  No comments found.            Goals:  Encounter Problems       Encounter Problems (Active)       Dressings Lower Extremities       STG - Patient will complete lower body dressing with mod assist with comp strategies and AE to maintain left LE WB status (Progressing)       Start:  03/01/25    Expected End:  03/15/25               Functional Balance       STG-Patient will be min assist with assistive device dynamic stand task >3 minutes for ADL completion maintaining left LE WB status (Progressing)       Start:  03/01/25    Expected End:  03/15/25               Functional Mobility       STG-Patient will be min assist with assistive device functional mobility tasks  maintaining left LE WB status (Progressing)       Start:  03/01/25    Expected End:  03/15/25               OT Transfers       STG-Patient will be min assist with functional transfers demonstrating good safety  (maintaining left LE WB status) (Progressing)       Start:  03/01/25     Expected End:  03/15/25               Safety       STG-Patient will independently verbalize left LE WB precautions with all functional tasks   (Progressing)       Start:  03/01/25    Expected End:  03/15/25                   REEMA WHITESIDE was present for supervision of this student during the treatment and documentation of this patient. ERMELINDA Patterson/JOSEFINA

## 2025-03-03 NOTE — PROGRESS NOTES
Physical Therapy    Physical Therapy    Physical Therapy Treatment    Patient Name: Sp Morrison  MRN: [id number]  Today's Date: 3/3/2025  Time Calculation  Start Time: 1036  Stop Time: 1059  Time Calculation (min): 23 min     4106/4106-A    Assessment/Plan   PT Assessment  PT Assessment Results: Decreased strength, Decreased endurance, Impaired balance, Decreased mobility  Rehab Prognosis: Good  End of Session Patient Position: Up in chair  PT Plan  Inpatient/Swing Bed or Outpatient: Inpatient  PT Plan  Treatment/Interventions: Bed mobility, Transfer training, Gait training, Stair training, Strengthening, Endurance training, Therapeutic exercise, Therapeutic activity, Home exercise program  PT Plan: Ongoing PT  PT Frequency: Daily  PT Discharge Recommendations: High intensity level of continued care  Equipment Recommended upon Discharge: Wheeled walker, Wheelchair  PT Recommended Transfer Status: Assist x2  PT - OK to Discharge: Yes     03/03/25 1036   PT  Visit   PT Received On 03/03/25   Response to Previous Treatment Patient with no complaints from previous session.   General   Reason for Referral Fall L Lateral Tibial Plateau Fx   Referred By Kelvin Cuellar MD   Past Medical History Relevant to Rehab Admitted 2/28/2025 after falling down 8 steps.  Xray left knee (+) lateral tibial plateau fracture.  Ortho consult completed and (-) surgical intervention.  Patient to wear immobilizer and maintain NWB.   Family/Caregiver Present No   Co-Treatment Reason for safety   Prior to Session Communication Bedside nurse   Patient Position Received Bed, 3 rail up   General Comment Patient having R kbee pain  and swelling in addition to L  Fx. R knee is warm peripatellar edema noted R knee   Precautions   LE Weight Bearing Status Left Non-Weight Bearing   Medical Precautions Fall precautions  (Immobilizer donned)   Pain Assessment   Pain Assessment 0-10   0-10 (Numeric) Pain Score 5 - Moderate pain   Pain Type Acute  pain   Pain Location Knee   Pain Orientation Right   Effect of Pain on Daily Activities unable to come to standing on R LE in walker   PAINAD (Pain Assessment in Advanced Dementia)   Pain Interventions Medication (See MAR)   Cognition   Overall Cognitive Status WFL   Orientation Level Oriented X4   General Observation   General Observation Pleasant and cooperative.   Therapeutic Exercise   Therapeutic Exercise Performed Yes   Therapeutic Exercise Activity 1 ap,qs,gs   Therapeutic Activity   Therapeutic Activity Performed Yes   Balance/Neuromuscular Re-Education   Balance/Neuromuscular Re-Education Activity Performed Yes   Bed Mobility   Bed Mobility Yes   Bed Mobility 1   Bed Mobility 1 Supine to sitting   Level of Assistance 1 Minimum assistance;Moderate assistance  (x2)   Bed Mobility Comments 1 L LE ou of bed   Bed Mobility 2   Bed Mobility  2 Sitting to supine   Level of Assistance 2 Minimum assistance   Transfers   Transfer No   Transfer 1   Technique 1 Sit to stand;Stand to sit  (Attempted sit-stand x 4  with Max A x 2 with no success this am secondary to L LE pain patient is unable to bear weight on  R LE bed elevated)   Trials/Comments 1 x4   Transfers 2   Transfer Level of Assistance 2 Minimum assistance  (x2)   Trials/Comments 2 Practiced scootning with min A X 1 from foot of bed to head of bed. This PTA holding L LE up   PT Assessment   PT Assessment Results Decreased strength;Decreased endurance;Impaired balance;Decreased mobility   Rehab Prognosis Good   End of Session Patient Position Up in chair   Outpatient Education   Individual(s) Educated Patient   Education Provided Fall Risk   PT Plan   Inpatient/Swing Bed or Outpatient Inpatient     Outcome Measures:  [facility] Basic Mobility  Turning from your back to your side while in a flat bed without using bedrails: A lot  Moving from lying on your back to sitting on the side of a flat bed without using bedrails: A lot  Moving to and from bed to chair  (including a wheelchair): Total  Standing up from a chair using your arms (e.g. wheelchair or bedside chair): Total  To walk in hospital room: Total  Climbing 3-5 steps with railing: Total  Basic Mobility - Total Score: 8                             EDUCATION:  Outpatient Education  Individual(s) Educated: Patient  Education Provided: Fall Risk  Education Documentation  No documentation found.  Education Comments  No comments found.        GOALS:  Encounter Problems       Encounter Problems (Active)       Mobility       STG - Patient will ambulate X 20-40 Ft. with a w/w, CGA.         Start:  03/01/25    Expected End:  03/15/25            STG - Patient will ambulate up and down a curb/step with a walker, MIN A x1.         Start:  03/01/25    Expected End:  03/15/25            Goal 1:  DEMO a G=SLR on L.         Start:  03/01/25    Expected End:  03/15/25               PT Transfers       STG - Patient will perform bed mobility with CGA.         Start:  03/01/25    Expected End:  03/15/25            STG - Patient will transfer sit to and from stand into a w/w, CGA.         Start:  03/01/25    Expected End:  03/15/25               Pain - Adult          Safety       STG - Patient uses gait belt during all transfers, curb step trng., and amb. trng..        Start:  03/01/25    Expected End:  03/15/25

## 2025-03-03 NOTE — CARE PLAN
The clinical goals for the shift include pain control    Over the shift, the patient did make progress toward the following goals.       Problem: Dressings Lower Extremities  Goal: STG - Patient will complete lower body dressing with mod assist with comp strategies and AE to maintain left LE WB status  Outcome: Progressing     Problem: Pain - Adult  Goal: Verbalizes/displays adequate comfort level or baseline comfort level  Outcome: Progressing     Problem: Safety - Adult  Goal: Free from fall injury  Outcome: Progressing     Problem: Discharge Planning  Goal: Discharge to home or other facility with appropriate resources  Outcome: Progressing     Problem: Chronic Conditions and Co-morbidities  Goal: Patient's chronic conditions and co-morbidity symptoms are monitored and maintained or improved  Outcome: Progressing     Problem: Nutrition  Goal: Nutrient intake appropriate for maintaining nutritional needs  Outcome: Progressing     Problem: Pain  Goal: Takes deep breaths with improved pain control throughout the shift  Outcome: Progressing  Goal: Turns in bed with improved pain control throughout the shift  Outcome: Progressing  Goal: Walks with improved pain control throughout the shift  Outcome: Progressing  Goal: Performs ADL's with improved pain control throughout shift  Outcome: Progressing  Goal: Participates in PT with improved pain control throughout the shift  Outcome: Progressing  Goal: Free from opioid side effects throughout the shift  Outcome: Progressing  Goal: Free from acute confusion related to pain meds throughout the shift  Outcome: Progressing     Problem: Fall/Injury  Goal: Not fall by end of shift  Outcome: Progressing  Goal: Be free from injury by end of the shift  Outcome: Progressing  Goal: Verbalize understanding of personal risk factors for fall in the hospital  Outcome: Progressing  Goal: Verbalize understanding of risk factor reduction measures to prevent injury from fall in the  home  Outcome: Progressing  Goal: Use assistive devices by end of the shift  Outcome: Progressing  Goal: Pace activities to prevent fatigue by end of the shift  Outcome: Progressing     Problem: Skin  Goal: Decreased wound size/increased tissue granulation at next dressing change  Outcome: Progressing  Goal: Participates in plan/prevention/treatment measures  Outcome: Progressing  Goal: Prevent/manage excess moisture  Outcome: Progressing  Goal: Prevent/minimize sheer/friction injuries  Outcome: Progressing  Goal: Promote/optimize nutrition  Outcome: Progressing  Goal: Promote skin healing  Outcome: Progressing

## 2025-03-04 LAB
ALBUMIN: 4 G/DL (ref 3.4–5)
ALPHA 1 GLOBULIN: 0.3 G/DL (ref 0.2–0.6)
ALPHA 2 GLOBULIN: 0.9 G/DL (ref 0.4–1.1)
ANION GAP SERPL CALC-SCNC: 10 MMOL/L (ref 10–20)
BETA GLOBULIN: 0.8 G/DL (ref 0.5–1.2)
BUN SERPL-MCNC: 14 MG/DL (ref 6–23)
CALCIUM SERPL-MCNC: 10.8 MG/DL (ref 8.6–10.3)
CHLORIDE SERPL-SCNC: 104 MMOL/L (ref 98–107)
CO2 SERPL-SCNC: 28 MMOL/L (ref 21–32)
CREAT SERPL-MCNC: 0.75 MG/DL (ref 0.5–1.3)
EGFRCR SERPLBLD CKD-EPI 2021: >90 ML/MIN/1.73M*2
ERYTHROCYTE [DISTWIDTH] IN BLOOD BY AUTOMATED COUNT: 12.9 % (ref 11.5–14.5)
GAMMA GLOBULIN: 0.8 G/DL (ref 0.5–1.4)
GLUCOSE SERPL-MCNC: 81 MG/DL (ref 74–99)
HCT VFR BLD AUTO: 39.3 % (ref 41–52)
HGB BLD-MCNC: 12.9 G/DL (ref 13.5–17.5)
IMMUNOFIXATION COMMENT: NORMAL
MCH RBC QN AUTO: 31.2 PG (ref 26–34)
MCHC RBC AUTO-ENTMCNC: 32.8 G/DL (ref 32–36)
MCV RBC AUTO: 95 FL (ref 80–100)
NRBC BLD-RTO: 0 /100 WBCS (ref 0–0)
PATH REVIEW - SERUM IMMUNOFIXATION: NORMAL
PATH REVIEW-SERUM PROTEIN ELECTROPHORESIS: NORMAL
PLATELET # BLD AUTO: 295 X10*3/UL (ref 150–450)
POTASSIUM SERPL-SCNC: 3.7 MMOL/L (ref 3.5–5.3)
PROTEIN ELECTROPHORESIS COMMENT: NORMAL
RBC # BLD AUTO: 4.13 X10*6/UL (ref 4.5–5.9)
SODIUM SERPL-SCNC: 138 MMOL/L (ref 136–145)
WBC # BLD AUTO: 6.6 X10*3/UL (ref 4.4–11.3)

## 2025-03-04 PROCEDURE — 80048 BASIC METABOLIC PNL TOTAL CA: CPT | Performed by: NURSE PRACTITIONER

## 2025-03-04 PROCEDURE — 2500000001 HC RX 250 WO HCPCS SELF ADMINISTERED DRUGS (ALT 637 FOR MEDICARE OP): Performed by: NURSE PRACTITIONER

## 2025-03-04 PROCEDURE — 2500000004 HC RX 250 GENERAL PHARMACY W/ HCPCS (ALT 636 FOR OP/ED): Performed by: NURSE PRACTITIONER

## 2025-03-04 PROCEDURE — 85027 COMPLETE CBC AUTOMATED: CPT | Performed by: NURSE PRACTITIONER

## 2025-03-04 PROCEDURE — 97110 THERAPEUTIC EXERCISES: CPT | Mod: GP,CQ

## 2025-03-04 PROCEDURE — 2500000002 HC RX 250 W HCPCS SELF ADMINISTERED DRUGS (ALT 637 FOR MEDICARE OP, ALT 636 FOR OP/ED): Performed by: INTERNAL MEDICINE

## 2025-03-04 PROCEDURE — 2500000002 HC RX 250 W HCPCS SELF ADMINISTERED DRUGS (ALT 637 FOR MEDICARE OP, ALT 636 FOR OP/ED): Performed by: NURSE PRACTITIONER

## 2025-03-04 PROCEDURE — 36415 COLL VENOUS BLD VENIPUNCTURE: CPT | Performed by: NURSE PRACTITIONER

## 2025-03-04 PROCEDURE — 1100000001 HC PRIVATE ROOM DAILY

## 2025-03-04 PROCEDURE — 97535 SELF CARE MNGMENT TRAINING: CPT | Mod: GO,CO

## 2025-03-04 PROCEDURE — 97116 GAIT TRAINING THERAPY: CPT | Mod: GP,CQ

## 2025-03-04 RX ORDER — LANOLIN ALCOHOL/MO/W.PET/CERES
100 CREAM (GRAM) TOPICAL DAILY
Qty: 30 TABLET | Refills: 0 | Status: SHIPPED | OUTPATIENT
Start: 2025-03-05

## 2025-03-04 RX ORDER — POLYETHYLENE GLYCOL 3350 17 G/17G
17 POWDER, FOR SOLUTION ORAL DAILY
Qty: 30 PACKET | Refills: 0 | Status: SHIPPED | OUTPATIENT
Start: 2025-03-04

## 2025-03-04 RX ORDER — CINACALCET 30 MG/1
30 TABLET, FILM COATED ORAL
Qty: 30 TABLET | Refills: 0 | Status: SHIPPED | OUTPATIENT
Start: 2025-03-05

## 2025-03-04 RX ORDER — OXYCODONE HYDROCHLORIDE 5 MG/1
5 TABLET ORAL EVERY 4 HOURS PRN
Qty: 15 TABLET | Refills: 0 | Status: SHIPPED | OUTPATIENT
Start: 2025-03-04

## 2025-03-04 RX ORDER — FOLIC ACID 1 MG/1
1 TABLET ORAL DAILY
Qty: 30 TABLET | Refills: 0 | Status: SHIPPED | OUTPATIENT
Start: 2025-03-05

## 2025-03-04 RX ORDER — MULTIVIT-MIN/IRON FUM/FOLIC AC 7.5 MG-4
1 TABLET ORAL DAILY
Qty: 30 TABLET | Refills: 0 | Status: SHIPPED | OUTPATIENT
Start: 2025-03-05

## 2025-03-04 RX ORDER — ACETAMINOPHEN 325 MG/1
975 TABLET ORAL EVERY 8 HOURS
Start: 2025-03-04

## 2025-03-04 RX ADMIN — ACETAMINOPHEN 325MG 975 MG: 325 TABLET ORAL at 06:19

## 2025-03-04 RX ADMIN — FOLIC ACID 1 MG: 1 TABLET ORAL at 08:32

## 2025-03-04 RX ADMIN — OXYCODONE HYDROCHLORIDE 5 MG: 5 TABLET ORAL at 04:08

## 2025-03-04 RX ADMIN — QUETIAPINE FUMARATE 100 MG: 100 TABLET ORAL at 20:41

## 2025-03-04 RX ADMIN — LEVOTHYROXINE SODIUM 150 MCG: 150 TABLET ORAL at 08:32

## 2025-03-04 RX ADMIN — ACETAMINOPHEN 325MG 975 MG: 325 TABLET ORAL at 13:18

## 2025-03-04 RX ADMIN — ALPRAZOLAM 0.5 MG: 0.5 TABLET ORAL at 20:41

## 2025-03-04 RX ADMIN — PANTOPRAZOLE SODIUM 40 MG: 40 TABLET, DELAYED RELEASE ORAL at 06:19

## 2025-03-04 RX ADMIN — CINACALCET HYDROCHLORIDE 30 MG: 30 TABLET, FILM COATED ORAL at 08:31

## 2025-03-04 RX ADMIN — Medication 1 TABLET: at 08:31

## 2025-03-04 RX ADMIN — OXYCODONE HYDROCHLORIDE 5 MG: 5 TABLET ORAL at 10:46

## 2025-03-04 RX ADMIN — POLYETHYLENE GLYCOL 3350 17 G: 17 POWDER, FOR SOLUTION ORAL at 10:46

## 2025-03-04 RX ADMIN — OXYCODONE HYDROCHLORIDE 5 MG: 5 TABLET ORAL at 20:41

## 2025-03-04 RX ADMIN — THIAMINE HCL TAB 100 MG 100 MG: 100 TAB at 08:32

## 2025-03-04 RX ADMIN — LATANOPROST 1 DROP: 50 SOLUTION OPHTHALMIC at 20:42

## 2025-03-04 RX ADMIN — ACETAMINOPHEN 325MG 975 MG: 325 TABLET ORAL at 22:20

## 2025-03-04 ASSESSMENT — PAIN SCALES - GENERAL
PAINLEVEL_OUTOF10: 4
PAINLEVEL_OUTOF10: 4
PAINLEVEL_OUTOF10: 0 - NO PAIN
PAINLEVEL_OUTOF10: 10 - WORST POSSIBLE PAIN
PAINLEVEL_OUTOF10: 8
PAINLEVEL_OUTOF10: 5 - MODERATE PAIN
PAINLEVEL_OUTOF10: 8
PAINLEVEL_OUTOF10: 0 - NO PAIN
PAINLEVEL_OUTOF10: 7

## 2025-03-04 ASSESSMENT — LIFESTYLE VARIABLES
AGITATION: NORMAL ACTIVITY
PAROXYSMAL SWEATS: NO SWEAT VISIBLE
ANXIETY: NO ANXIETY, AT EASE
TREMOR: NO TREMOR
TOTAL SCORE: 0
ANXIETY: NO ANXIETY, AT EASE
TOTAL SCORE: 0
PAROXYSMAL SWEATS: NO SWEAT VISIBLE
AUDITORY DISTURBANCES: NOT PRESENT
TREMOR: NO TREMOR
NAUSEA AND VOMITING: NO NAUSEA AND NO VOMITING
AUDITORY DISTURBANCES: NOT PRESENT
VISUAL DISTURBANCES: NOT PRESENT
HEADACHE, FULLNESS IN HEAD: NOT PRESENT
NAUSEA AND VOMITING: NO NAUSEA AND NO VOMITING
TREMOR: NO TREMOR
ORIENTATION AND CLOUDING OF SENSORIUM: ORIENTED AND CAN DO SERIAL ADDITIONS
NAUSEA AND VOMITING: NO NAUSEA AND NO VOMITING
AGITATION: NORMAL ACTIVITY
VISUAL DISTURBANCES: NOT PRESENT
AUDITORY DISTURBANCES: NOT PRESENT
ORIENTATION AND CLOUDING OF SENSORIUM: ORIENTED AND CAN DO SERIAL ADDITIONS
HEADACHE, FULLNESS IN HEAD: NOT PRESENT
AGITATION: NORMAL ACTIVITY
ANXIETY: NO ANXIETY, AT EASE
TOTAL SCORE: 0
VISUAL DISTURBANCES: NOT PRESENT
PAROXYSMAL SWEATS: NO SWEAT VISIBLE
HEADACHE, FULLNESS IN HEAD: NOT PRESENT
ORIENTATION AND CLOUDING OF SENSORIUM: ORIENTED AND CAN DO SERIAL ADDITIONS

## 2025-03-04 ASSESSMENT — PAIN - FUNCTIONAL ASSESSMENT
PAIN_FUNCTIONAL_ASSESSMENT: 0-10

## 2025-03-04 ASSESSMENT — ACTIVITIES OF DAILY LIVING (ADL)
HOME_MANAGEMENT_TIME_ENTRY: 30
EFFECT OF PAIN ON DAILY ACTIVITIES: .

## 2025-03-04 ASSESSMENT — COGNITIVE AND FUNCTIONAL STATUS - GENERAL
DRESSING REGULAR LOWER BODY CLOTHING: TOTAL
HELP NEEDED FOR BATHING: A LOT
TURNING FROM BACK TO SIDE WHILE IN FLAT BAD: A LOT
MOVING TO AND FROM BED TO CHAIR: A LOT
MOBILITY SCORE: 11
CLIMB 3 TO 5 STEPS WITH RAILING: TOTAL
DRESSING REGULAR UPPER BODY CLOTHING: A LITTLE
TOILETING: A LOT
STANDING UP FROM CHAIR USING ARMS: A LOT
WALKING IN HOSPITAL ROOM: A LOT
DAILY ACTIVITIY SCORE: 16
MOVING FROM LYING ON BACK TO SITTING ON SIDE OF FLAT BED WITH BEDRAILS: A LOT

## 2025-03-04 ASSESSMENT — PAIN DESCRIPTION - ORIENTATION: ORIENTATION: RIGHT

## 2025-03-04 ASSESSMENT — PAIN SCALES - PAIN ASSESSMENT IN ADVANCED DEMENTIA (PAINAD): TOTALSCORE: MEDICATION (SEE MAR);REPOSITIONED

## 2025-03-04 NOTE — NURSING NOTE
Pt sat on the edge of the bed with therapy  CIWA score zero  Family at the bedside  Complaint of pain in his right knee, ortho team made aware

## 2025-03-04 NOTE — PROGRESS NOTES
"Sp Morrison is a 60 y.o. male on day 3 of admission presenting with Tibial plateau fracture, left.      Subjective   Patient is feeling well, no N/V, no CP or SOB.      Objective          Vitals 24HR  Heart Rate:  [65-88]   Temp:  [36.2 °C (97.2 °F)-36.8 °C (98.2 °F)]   Resp:  [16-20]   BP: (107-133)/(66-84)   Height:  [180.3 cm (5' 10.98\")]   Weight:  [109 kg (240 lb 4.8 oz)]   SpO2:  [93 %-97 %]     Intake/Output last 3 Shifts:    Intake/Output Summary (Last 24 hours) at 3/4/2025 1030  Last data filed at 3/4/2025 0400  Gross per 24 hour   Intake --   Output 800 ml   Net -800 ml       Physical Exam  GENERAL: No distress.   SKIN: No rashes or lesions.  EYES: PERRLA, EOMI  HEENT: Head: Normocephalic  Neck: supple and no adenopathy  LUNGS: Lungs clear to auscultation. Good diaphragmatic excursion.  CARDIAC: Normal S1 and S2; no rubs, murmurs, or gallops  ABDOMEN: Abdomen soft, non-tender. BS normal.   EXTREMITIES: No ulcers, Extremities normal.   NEURO: No focal deficit.     Relevant Results             Scheduled medications  acetaminophen, 975 mg, oral, q8h  ALPRAZolam, 0.5 mg, oral, Daily  cinacalcet, 30 mg, oral, Daily with breakfast  dorzolamide-timoloL, 1 drop, Both Eyes, BID  folic acid, 1 mg, oral, Daily  [Held by provider] heparin (porcine), 5,000 Units, subcutaneous, q8h  latanoprost, 1 drop, Both Eyes, Nightly  levothyroxine, 150 mcg, oral, Daily  multivitamin with minerals, 1 tablet, oral, Daily  pantoprazole, 40 mg, oral, Daily before breakfast  QUEtiapine, 100 mg, oral, Nightly  thiamine, 100 mg, oral, Daily      Continuous medications         PRN medications  PRN medications: albuterol, LORazepam **OR** LORazepam **OR** LORazepam, melatonin, metoprolol, morphine, ondansetron, oxyCODONE, polyethylene glycol  Results for orders placed or performed during the hospital encounter of 02/28/25 (from the past 24 hours)   Basic metabolic panel   Result Value Ref Range    Glucose 81 74 - 99 mg/dL    Sodium " 138 136 - 145 mmol/L    Potassium 3.7 3.5 - 5.3 mmol/L    Chloride 104 98 - 107 mmol/L    Bicarbonate 28 21 - 32 mmol/L    Anion Gap 10 10 - 20 mmol/L    Urea Nitrogen 14 6 - 23 mg/dL    Creatinine 0.75 0.50 - 1.30 mg/dL    eGFR >90 >60 mL/min/1.73m*2    Calcium 10.8 (H) 8.6 - 10.3 mg/dL   CBC   Result Value Ref Range    WBC 6.6 4.4 - 11.3 x10*3/uL    nRBC 0.0 0.0 - 0.0 /100 WBCs    RBC 4.13 (L) 4.50 - 5.90 x10*6/uL    Hemoglobin 12.9 (L) 13.5 - 17.5 g/dL    Hematocrit 39.3 (L) 41.0 - 52.0 %    MCV 95 80 - 100 fL    MCH 31.2 26.0 - 34.0 pg    MCHC 32.8 32.0 - 36.0 g/dL    RDW 12.9 11.5 - 14.5 %    Platelets 295 150 - 450 x10*3/uL         Assessment/Plan              Assessment & Plan  Fall, initial encounter    Hypercalcemia    Fall    Elevated lactic acid level    Tibial plateau fracture, left    Lipohemarthrosis      60 y.o. male   PMH; esophageal varices, hypothyroidism, HLP, liver cirrhosis.   Patient is presenting with left knee pain.  He reportedly fell on steps last night when he was intoxicated.  He denies loss of consciousness but reportedly did hit his head.  He was brought by EMS to [facility] where x-rays were obtained demonstrating a lateral tibial plateau fracture.  Orthopedics was consulted.  The patient was subsequently admitted to the hospital.  He has undergone previous surgery on this knee with an ACL reconstruction and some form of meniscal surgery back in 2014.      // Left lateral tibial plateau fracture  Ortho is on consult.   PT/OT     Ortho evaluated.   CM d/w patient and sisters.   I d/w patient and sisters.   Ok to dc with outpatient follow up.        // Hypercalcemia  // Hyperparathyroidism.   - Ionized Ca; 1.44  - PTH; 94  - 25D; 28  - 1,25D; 87   - Phos; 2.3  - Check K/L  - Check SPEP with IF  - IVF;   TSH and cortisol, not significant. Has hypothyro with elevated TPO.   Will start cinacalcet 30 every day       Kelvin Cuellar MD

## 2025-03-04 NOTE — CARE PLAN
Brief Ortho Progress Note    Patient seen today per request of primary team.  Since fall patient has had some worsening of right knee pain.  New x-rays of right knee obtained and reviewed.  X-rays demonstrate effusion with chronic calcifications and changes.  No obvious underlying process.  We discussed that increased pain could be related to initial trauma however patient does not recall injuring this knee or originally having pain.  From ortho perspective patient can WBAT to R leg.  I would still recommend temporary discharge to patient's sister's home with plans for outpatient surgery and possible subsequent discharge to rehab after surgery.  Patient has outpatient follow-up scheduled with Dr. Whitt on Thursday.   I will speak with Dr. Whitt tomorrow regarding the situation.  Further plans to follow.  Please contact me with questions.    Osmar Kirby MD   ambulatory

## 2025-03-04 NOTE — CARE PLAN
Problem: Pain - Adult  Goal: Verbalizes/displays adequate comfort level or baseline comfort level  Outcome: Progressing     Problem: Safety - Adult  Goal: Free from fall injury  Outcome: Progressing     Problem: Pain  Goal: Turns in bed with improved pain control throughout the shift  Outcome: Progressing  Goal: Free from opioid side effects throughout the shift  Outcome: Progressing  Goal: Free from acute confusion related to pain meds throughout the shift  Outcome: Progressing     Problem: Fall/Injury  Goal: Not fall by end of shift  Outcome: Progressing  Goal: Be free from injury by end of the shift  Outcome: Progressing  Goal: Verbalize understanding of personal risk factors for fall in the hospital  Outcome: Progressing     Problem: Skin  Goal: Participates in plan/prevention/treatment measures  Outcome: Progressing   The patient's goals for the shift include      The clinical goals for the shift include Pain management    Patient remained safe this shift. Patient alert and oriented, unable to get up OOB d/t NWB on LLE and unable to tolerate weight-bearing to RLE d/t pain to right knee. Pain to BLE managed with routine and PRN pain meds. Neurovascular unchanged this shift. VSS. CIWA remains negative.

## 2025-03-04 NOTE — PROGRESS NOTES
Physical Therapy    Physical Therapy    Physical Therapy Treatment    Patient Name: Sp Morrison  MRN: [id number]  Today's Date: 3/4/2025  Time Calculation  Start Time: 1118  Stop Time: 1159  Time Calculation (min): 41 min     4106/4106-A    Assessment/Plan   PT Assessment  PT Assessment Results: Decreased strength, Decreased endurance, Impaired balance, Decreased mobility  Rehab Prognosis: Good  End of Session Patient Position: Up in chair  PT Plan  Inpatient/Swing Bed or Outpatient: Inpatient  PT Plan  Treatment/Interventions: Bed mobility, Transfer training, Gait training, Stair training, Strengthening, Endurance training, Therapeutic exercise, Therapeutic activity, Home exercise program  PT Plan: Ongoing PT  PT Frequency: Daily  PT Discharge Recommendations: High intensity level of continued care  Equipment Recommended upon Discharge: Wheeled walker, Wheelchair  PT Recommended Transfer Status: Assist x2  PT - OK to Discharge: Yes     03/04/25 1118   PT  Visit   PT Received On 03/04/25   Response to Previous Treatment Patient with no complaints from previous session.   General   Reason for Referral Fall L Lateral Tibial Plateau Fx   Referred By Kelvin Cuellar MD   Past Medical History Relevant to Rehab Admitted 2/28/2025 after falling down 8 steps.  Xray left knee (+) lateral tibial plateau fracture.  Ortho consult completed and (-) surgical intervention.  Patient to wear immobilizer and maintain NWB.   Family/Caregiver Present Yes   Prior to Session Communication Bedside nurse   Patient Position Received Bed, 3 rail up   Precautions   LE Weight Bearing Status Left Non-Weight Bearing   Medical Precautions Fall precautions   Braces Applied Immobilizer donned L   Pain Assessment   Pain Assessment 0-10   0-10 (Numeric) Pain Score 8   Pain Type Acute pain   Pain Location Knee   Pain Orientation Right  (Patient has 10/10 pain R knee but flet a pop when he came to standing and pain decreased to 8/10)   Effect of  Pain on Daily Activities .   General Observation   General Observation Pleasant and cooperative.   Therapeutic Exercise   Therapeutic Exercise Performed Yes   Therapeutic Activity   Therapeutic Activity Performed Yes   Balance/Neuromuscular Re-Education   Balance/Neuromuscular Re-Education Activity Performed Yes   Bed Mobility   Bed Mobility Yes   Bed Mobility 1   Bed Mobility 1 Supine to sitting   Level of Assistance 1 Moderate assistance  (x2)   Bed Mobility Comments 1 assist for B LE   Ambulation/Gait Training   Ambulation/Gait Training Performed Yes   Ambulation/Gait Training 1   Surface 1 Level tile   Device 1 Rolling walker   Gait Support Devices Gait belt   Assistance 1 Minimum assistance  (x2)   Comments/Distance (ft) 1 5 side steps, 4-5 turning steps, 15  (close chair follow Hands on assist as patien tis very unsteady is able to maintain NWB R LE)   Transfers   Transfer Yes   Transfer 1   Technique 1 Sit to stand;Stand to sit   Transfer Device 1 Walker;Gait belt   Transfer Level of Assistance 1 Moderate assistance;+2   Trials/Comments 1 stood x 3  (Patient needing hands on assist for stability)   PT Assessment   PT Assessment Results Decreased strength;Decreased endurance;Impaired balance;Decreased mobility   Rehab Prognosis Good   End of Session Patient Position Up in chair   Outpatient Education   Individual(s) Educated Patient   Education Provided Fall Risk   PT Plan   Inpatient/Swing Bed or Outpatient Inpatient     Outcome Measures:  [facility] Basic Mobility  Turning from your back to your side while in a flat bed without using bedrails: A lot  Moving from lying on your back to sitting on the side of a flat bed without using bedrails: A lot  Moving to and from bed to chair (including a wheelchair): A lot  Standing up from a chair using your arms (e.g. wheelchair or bedside chair): A lot  To walk in hospital room: A lot  Climbing 3-5 steps with railing: Total  Basic Mobility - Total Score: 11                              EDUCATION:  Outpatient Education  Individual(s) Educated: Patient  Education Provided: Fall Risk  Education Documentation  No documentation found.  Education Comments  No comments found.        GOALS:  Encounter Problems       Encounter Problems (Active)       Mobility       STG - Patient will ambulate X 20-40 Ft. with a w/w, CGA.         Start:  03/01/25    Expected End:  03/15/25            STG - Patient will ambulate up and down a curb/step with a walker, MIN A x1.         Start:  03/01/25    Expected End:  03/15/25            Goal 1:  DEMO a G=SLR on L.         Start:  03/01/25    Expected End:  03/15/25               PT Transfers       STG - Patient will perform bed mobility with CGA.         Start:  03/01/25    Expected End:  03/15/25            STG - Patient will transfer sit to and from stand into a w/w, CGA.         Start:  03/01/25    Expected End:  03/15/25               Pain - Adult          Safety       STG - Patient uses gait belt during all transfers, curb step trng., and amb. trng..        Start:  03/01/25    Expected End:  03/15/25

## 2025-03-04 NOTE — PROGRESS NOTES
Occupational Therapy    Occupational Therapy    OT Treatment    Patient Name: Sp Morrison  MRN: [id number]  Today's Date: 3/4/2025          4106/4106-A    Assessment:  End of Session Communication: Bedside nurse  End of Session Patient Position: Up in chair, Alarm on       Plan:  OT Frequency: 5 times per week  OT Discharge Recommendations: High intensity level of continued care     Subjective      03/04/25 1120   OT Last Visit   OT Received On 03/04/25   General   Reason for Referral Fall L Lateral Tibial Plateau Fx   Referred By Kelvin Cuellar MD   Past Medical History Relevant to Rehab Admitted 2/28/2025 after falling down 8 steps.  Xray left knee (+) lateral tibial plateau fracture.  Ortho consult completed and (-) surgical intervention.  Patient to wear immobilizer and maintain NWB.   Prior to Session Communication Bedside nurse   Patient Position Received Bed, 3 rail up;Alarm on   General Comment Pt agreeable to tx, cleared for participation. Educated pt on benefits for getting to the chair and for all meals.   Precautions   LE Weight Bearing Status Left Non-Weight Bearing   Medical Precautions Fall precautions   Braces Applied Immobilizer donned L   Pain Assessment   Pain Assessment 0-10  (Pt rates pain at 10 with activity, decreased to 8 with rest. Pt felt popping in R knee when in stance, reports some relief, nursing aware.)   0-10 (Numeric) Pain Score 8   Pain Type Acute pain   Pain Location Knee   Pain Orientation Right   Pain Interventions Rest;Repositioned   Cognition   Orientation Level Oriented X4   Grooming   Grooming Level of Assistance Close supervision   Grooming Where Assessed Sitting sinkside   Grooming Comments Pt completed grooming tasks while seated in recliner at sink.   UE Dressing   UE Dressing Level of Assistance Minimum assistance   UE Dressing Where Assessed Edge of bed   UE Dressing Comments Pt donned gown around back to use as a robe, assist needed to thread arms through.   Bed  Mobility 1   Bed Mobility 1 Supine to sitting   Level of Assistance 1 Moderate assistance;+2   Bed Mobility Comments 1 HOB elevated,  assistance to elevate trunk.   Functional Mobility   Functional Mobility Performed   (Pt ambulated short distance in room with gait belt, fww, Min A x2 with chair follow, maintains WB precautions throughout.)   Transfer 1   Technique 1 Sit to stand;Stand to sit   Transfer Device 1 Walker;Gait belt   Transfer Level of Assistance 1 Moderate assistance;+2;Minimal verbal cues   Trials/Comments 1 STS at EOB and recliner level, cues for proper hand placement with fair follow through.   Transfers 2   Technique 2 Stand pivot;To right   Transfer Device 2 Walker;Gait belt   Transfer Level of Assistance 2 Moderate assistance;+2   Trials/Comments 2 Stand pivot from EOB to recliner.   IP OT Assessment   End of Session Communication Bedside nurse   End of Session Patient Position Up in chair;Alarm on   Inpatient Plan   OT Frequency 5 times per week   OT Discharge Recommendations High intensity level of continued care     Outcome Measures:[facility] Daily Activity  Putting on and taking off regular lower body clothing: Total  Bathing (including washing, rinsing, drying): A lot  Putting on and taking off regular upper body clothing: A little  Toileting, which includes using toilet, bedpan or urinal: A lot  Taking care of personal grooming such as brushing teeth: None  Eating Meals: None  Daily Activity - Total Score: 16  Education Documentation  No documentation found.  Education Comments  No comments found.            Goals:  Encounter Problems       Encounter Problems (Active)       Dressings Lower Extremities       STG - Patient will complete lower body dressing with mod assist with comp strategies and AE to maintain left LE WB status (Progressing)       Start:  03/01/25    Expected End:  03/15/25               Functional Balance       STG-Patient will be min assist with assistive device dynamic stand  task >3 minutes for ADL completion maintaining left LE WB status (Progressing)       Start:  03/01/25    Expected End:  03/15/25               Functional Mobility       STG-Patient will be min assist with assistive device functional mobility tasks  maintaining left LE WB status (Progressing)       Start:  03/01/25    Expected End:  03/15/25               OT Transfers       STG-Patient will be min assist with functional transfers demonstrating good safety  (maintaining left LE WB status) (Progressing)       Start:  03/01/25    Expected End:  03/15/25               Safety       STG-Patient will independently verbalize left LE WB precautions with all functional tasks   (Progressing)       Start:  03/01/25    Expected End:  03/15/25                     REEMA WHITESIDE was present for supervision of this student during the treatment and documentation of this patient. ERMELINDA Patterson/JOSEFINA

## 2025-03-04 NOTE — PROGRESS NOTES
Met with the patient and his two sisters at the bedside. The pt is agreeable to [facility] as the plan is currently to dc to his sisters home. Sisters name is Briseida Melendez and her address is [address] [place] [address]. Will need to send internal HCO at time of dc.     1300 Met with pt and his sisters to present [facility] SNF list as his sister is concerned about taking pt home (safety, needing significant assist to stand/pivot). Encouraged pt to make several selections.    1435 DSC tasked to send referrals to [facility], [facility], [facility] and [facility]. Pt has an appt Thursday at [facility] in [place] to discuss surgery.     1655 FOC has not been determined yet. Pt will also need precert prior to dc. MD, ortho and NP updated.

## 2025-03-05 PROCEDURE — 97116 GAIT TRAINING THERAPY: CPT | Mod: GP,CQ

## 2025-03-05 PROCEDURE — 2500000001 HC RX 250 WO HCPCS SELF ADMINISTERED DRUGS (ALT 637 FOR MEDICARE OP): Performed by: NURSE PRACTITIONER

## 2025-03-05 PROCEDURE — 2500000004 HC RX 250 GENERAL PHARMACY W/ HCPCS (ALT 636 FOR OP/ED): Performed by: NURSE PRACTITIONER

## 2025-03-05 PROCEDURE — 1100000001 HC PRIVATE ROOM DAILY

## 2025-03-05 PROCEDURE — 2500000002 HC RX 250 W HCPCS SELF ADMINISTERED DRUGS (ALT 637 FOR MEDICARE OP, ALT 636 FOR OP/ED): Performed by: INTERNAL MEDICINE

## 2025-03-05 PROCEDURE — 97110 THERAPEUTIC EXERCISES: CPT | Mod: GP,CQ

## 2025-03-05 PROCEDURE — 2500000002 HC RX 250 W HCPCS SELF ADMINISTERED DRUGS (ALT 637 FOR MEDICARE OP, ALT 636 FOR OP/ED): Performed by: NURSE PRACTITIONER

## 2025-03-05 PROCEDURE — 97535 SELF CARE MNGMENT TRAINING: CPT | Mod: GO,CO

## 2025-03-05 RX ORDER — POLYETHYLENE GLYCOL 3350 17 G/17G
17 POWDER, FOR SOLUTION ORAL 2 TIMES DAILY
Start: 2025-03-05

## 2025-03-05 RX ORDER — HEPARIN SODIUM 5000 [USP'U]/ML
5000 INJECTION, SOLUTION INTRAVENOUS; SUBCUTANEOUS EVERY 8 HOURS
Status: DISCONTINUED | OUTPATIENT
Start: 2025-03-05 | End: 2025-03-06

## 2025-03-05 RX ORDER — AMOXICILLIN 250 MG
2 CAPSULE ORAL 2 TIMES DAILY
Status: DISCONTINUED | OUTPATIENT
Start: 2025-03-05 | End: 2025-03-08 | Stop reason: HOSPADM

## 2025-03-05 RX ORDER — POLYETHYLENE GLYCOL 3350 17 G/17G
17 POWDER, FOR SOLUTION ORAL 2 TIMES DAILY
Status: DISCONTINUED | OUTPATIENT
Start: 2025-03-05 | End: 2025-03-08 | Stop reason: HOSPADM

## 2025-03-05 RX ORDER — AMOXICILLIN 250 MG
2 CAPSULE ORAL 2 TIMES DAILY
Start: 2025-03-05

## 2025-03-05 RX ADMIN — DORZOLAMIDE HYDROCHLORIDE AND TIMOLOL MALEATE 1 DROP: 20; 5 SOLUTION/ DROPS OPHTHALMIC at 20:13

## 2025-03-05 RX ADMIN — Medication 1 TABLET: at 09:54

## 2025-03-05 RX ADMIN — PANTOPRAZOLE SODIUM 40 MG: 40 TABLET, DELAYED RELEASE ORAL at 06:16

## 2025-03-05 RX ADMIN — CINACALCET HYDROCHLORIDE 30 MG: 30 TABLET, FILM COATED ORAL at 09:54

## 2025-03-05 RX ADMIN — DORZOLAMIDE HYDROCHLORIDE AND TIMOLOL MALEATE 1 DROP: 20; 5 SOLUTION/ DROPS OPHTHALMIC at 14:10

## 2025-03-05 RX ADMIN — LATANOPROST 1 DROP: 50 SOLUTION OPHTHALMIC at 20:13

## 2025-03-05 RX ADMIN — HEPARIN SODIUM 5000 UNITS: 5000 INJECTION, SOLUTION INTRAVENOUS; SUBCUTANEOUS at 14:09

## 2025-03-05 RX ADMIN — HEPARIN SODIUM 5000 UNITS: 5000 INJECTION, SOLUTION INTRAVENOUS; SUBCUTANEOUS at 21:18

## 2025-03-05 RX ADMIN — OXYCODONE HYDROCHLORIDE 5 MG: 5 TABLET ORAL at 09:54

## 2025-03-05 RX ADMIN — QUETIAPINE FUMARATE 100 MG: 100 TABLET ORAL at 20:13

## 2025-03-05 RX ADMIN — FOLIC ACID 1 MG: 1 TABLET ORAL at 09:55

## 2025-03-05 RX ADMIN — POLYETHYLENE GLYCOL 3350 17 G: 17 POWDER, FOR SOLUTION ORAL at 09:57

## 2025-03-05 RX ADMIN — ALPRAZOLAM 0.5 MG: 0.5 TABLET ORAL at 20:13

## 2025-03-05 RX ADMIN — THIAMINE HCL TAB 100 MG 100 MG: 100 TAB at 09:55

## 2025-03-05 RX ADMIN — SENNOSIDES AND DOCUSATE SODIUM 2 TABLET: 50; 8.6 TABLET ORAL at 20:13

## 2025-03-05 RX ADMIN — OXYCODONE HYDROCHLORIDE 5 MG: 5 TABLET ORAL at 14:09

## 2025-03-05 RX ADMIN — ACETAMINOPHEN 325MG 975 MG: 325 TABLET ORAL at 21:17

## 2025-03-05 RX ADMIN — LEVOTHYROXINE SODIUM 150 MCG: 150 TABLET ORAL at 09:55

## 2025-03-05 RX ADMIN — ACETAMINOPHEN 325MG 975 MG: 325 TABLET ORAL at 14:09

## 2025-03-05 RX ADMIN — POLYETHYLENE GLYCOL 3350 17 G: 17 POWDER, FOR SOLUTION ORAL at 20:17

## 2025-03-05 RX ADMIN — OXYCODONE HYDROCHLORIDE 5 MG: 5 TABLET ORAL at 20:13

## 2025-03-05 RX ADMIN — ACETAMINOPHEN 325MG 975 MG: 325 TABLET ORAL at 06:16

## 2025-03-05 ASSESSMENT — COGNITIVE AND FUNCTIONAL STATUS - GENERAL
MOVING TO AND FROM BED TO CHAIR: A LOT
WALKING IN HOSPITAL ROOM: A LOT
TURNING FROM BACK TO SIDE WHILE IN FLAT BAD: A LOT
DAILY ACTIVITIY SCORE: 15
TOILETING: A LOT
PERSONAL GROOMING: A LITTLE
STANDING UP FROM CHAIR USING ARMS: A LOT
DRESSING REGULAR UPPER BODY CLOTHING: A LITTLE
DRESSING REGULAR LOWER BODY CLOTHING: TOTAL
HELP NEEDED FOR BATHING: A LOT
MOBILITY SCORE: 11
CLIMB 3 TO 5 STEPS WITH RAILING: TOTAL
MOVING FROM LYING ON BACK TO SITTING ON SIDE OF FLAT BED WITH BEDRAILS: A LOT

## 2025-03-05 ASSESSMENT — PAIN DESCRIPTION - ORIENTATION
ORIENTATION: RIGHT
ORIENTATION: RIGHT

## 2025-03-05 ASSESSMENT — LIFESTYLE VARIABLES
HEADACHE, FULLNESS IN HEAD: NOT PRESENT
TOTAL SCORE: 0
HEADACHE, FULLNESS IN HEAD: NOT PRESENT
PAROXYSMAL SWEATS: NO SWEAT VISIBLE
AUDITORY DISTURBANCES: NOT PRESENT
AUDITORY DISTURBANCES: NOT PRESENT
AGITATION: NORMAL ACTIVITY
ANXIETY: NO ANXIETY, AT EASE
PAROXYSMAL SWEATS: NO SWEAT VISIBLE
VISUAL DISTURBANCES: NOT PRESENT
VISUAL DISTURBANCES: NOT PRESENT
TOTAL SCORE: 0
NAUSEA AND VOMITING: NO NAUSEA AND NO VOMITING
ORIENTATION AND CLOUDING OF SENSORIUM: ORIENTED AND CAN DO SERIAL ADDITIONS
NAUSEA AND VOMITING: NO NAUSEA AND NO VOMITING
ANXIETY: NO ANXIETY, AT EASE
ORIENTATION AND CLOUDING OF SENSORIUM: ORIENTED AND CAN DO SERIAL ADDITIONS
AGITATION: NORMAL ACTIVITY
TREMOR: NO TREMOR
TREMOR: NO TREMOR

## 2025-03-05 ASSESSMENT — PAIN SCALES - GENERAL
PAINLEVEL_OUTOF10: 9
PAINLEVEL_OUTOF10: 5 - MODERATE PAIN
PAINLEVEL_OUTOF10: 7
PAINLEVEL_OUTOF10: 7
PAINLEVEL_OUTOF10: 9
PAINLEVEL_OUTOF10: 4
PAINLEVEL_OUTOF10: 6
PAINLEVEL_OUTOF10: 10 - WORST POSSIBLE PAIN

## 2025-03-05 ASSESSMENT — PAIN DESCRIPTION - LOCATION
LOCATION: KNEE
LOCATION: KNEE

## 2025-03-05 ASSESSMENT — ACTIVITIES OF DAILY LIVING (ADL): HOME_MANAGEMENT_TIME_ENTRY: 38

## 2025-03-05 ASSESSMENT — PAIN SCALES - PAIN ASSESSMENT IN ADVANCED DEMENTIA (PAINAD)
TOTALSCORE: MEDICATION (SEE MAR);REPOSITIONED
TOTALSCORE: MEDICATION (SEE MAR)

## 2025-03-05 ASSESSMENT — PAIN - FUNCTIONAL ASSESSMENT
PAIN_FUNCTIONAL_ASSESSMENT: 0-10

## 2025-03-05 NOTE — PROGRESS NOTES
Spiritual Care Visit  Spiritual Care Request    Reason for Visit:  Routine Visit: Introduction     Request Received From:       Focus of Care:  Visited With: Patient and family together         Refer to :          Spiritual Care Assessment    Spiritual Assessment:                      Care Provided:  Intended Effects: Promote sense of peace, Preserve dignity and respect, Meaning-making  Methods: Offer support  Interventions: Share words of hope and inspiration    Sense of Community and or Mu-ism Affiliation:  Other         Addressed Needs/Concerns and/or Rhonda Through:          Outcome:        Advance Directives:         Spiritual Care Annotation    Annotation:  Patient was with his two sisters.   was a supportive presence.

## 2025-03-05 NOTE — CARE PLAN
Problem: Dressings Lower Extremities  Goal: STG - Patient will complete lower body dressing with mod assist with comp strategies and AE to maintain left LE WB status  Outcome: Progressing     Problem: Pain - Adult  Goal: Verbalizes/displays adequate comfort level or baseline comfort level  Outcome: Progressing     Problem: Safety - Adult  Goal: Free from fall injury  Outcome: Progressing     Problem: Discharge Planning  Goal: Discharge to home or other facility with appropriate resources  Outcome: Progressing     Problem: Chronic Conditions and Co-morbidities  Goal: Patient's chronic conditions and co-morbidity symptoms are monitored and maintained or improved  Outcome: Progressing     Problem: Nutrition  Goal: Nutrient intake appropriate for maintaining nutritional needs  Outcome: Progressing     Problem: Pain  Goal: Takes deep breaths with improved pain control throughout the shift  Outcome: Progressing     Problem: Fall/Injury  Goal: Not fall by end of shift  Outcome: Progressing     Problem: Skin  Goal: Decreased wound size/increased tissue granulation at next dressing change  Outcome: Progressing

## 2025-03-05 NOTE — CARE PLAN
Problem: Pain - Adult  Goal: Verbalizes/displays adequate comfort level or baseline comfort level  Outcome: Progressing     Problem: Safety - Adult  Goal: Free from fall injury  Outcome: Progressing     Problem: Skin  Goal: Promote/optimize nutrition  Outcome: Progressing  Promote/optimize nutrition: Offer water/supplements/favorite foods     Problem: Fall/Injury  Goal: Not fall by end of shift  Outcome: Progressing    The patient's goals for the shift include  to have his xanax medication given at bedtime and to be comfortable.    The clinical goals for the shift include patient will have stable vital signs for the duration of this shift.

## 2025-03-05 NOTE — PROGRESS NOTES
03/05/25 1117   Discharge Planning   Home or Post Acute Services Post acute facilities (Rehab/SNF/etc)   Type of Post Acute Facility Services Skilled nursing   Expected Discharge Disposition SNF  ([facility])     T[facility] reports pt's [facility] is [facility]. [facility] can accept and has started pre-cert. SW to follow.

## 2025-03-05 NOTE — PROGRESS NOTES
Spiritual Care Visit  Spiritual Care Request    Reason for Visit:  Routine Visit: Follow-up  Continue Visiting: Yes     Request Received From:       Focus of Care:  Visited With: Patient         Refer to :          Spiritual Care Assessment    Spiritual Assessment:                      Care Provided:  Intended Effects: Build relationship of care and support, Convey a calming presence, Demonstrate caring and concern, Lessen someone's feelings of isolation, Helping someone feel comforted, Promote sense of peace  Methods: Offer support  Interventions: Share words of hope and inspiration    Sense of Community and or Anabaptist Affiliation:  Other         Addressed Needs/Concerns and/or Rhonda Through:          Outcome:        Advance Directives:         Spiritual Care Annotation    Annotation:  Stopped in to see patient - he told me  Biju had already come by this morning.  Wished him well!

## 2025-03-05 NOTE — PROGRESS NOTES
Physical Therapy    Physical Therapy    Physical Therapy Treatment    Patient Name: Sp Morrison  MRN: [id number]  Today's Date: 3/5/2025  Time Calculation  Start Time: 1302  Stop Time: 1332  Time Calculation (min): 30 min     4106/4106-A    Assessment/Plan   PT Assessment  PT Assessment Results: Decreased strength, Decreased endurance, Impaired balance, Decreased mobility  Rehab Prognosis: Good  End of Session Patient Position: Up in chair  PT Plan  Inpatient/Swing Bed or Outpatient: Inpatient  PT Plan  Treatment/Interventions: Bed mobility, Transfer training, Gait training, Stair training, Strengthening, Endurance training, Therapeutic exercise, Therapeutic activity, Home exercise program  PT Plan: Ongoing PT  PT Frequency: Daily  PT Discharge Recommendations: High intensity level of continued care  Equipment Recommended upon Discharge: Wheeled walker, Wheelchair  PT Recommended Transfer Status: Assist x2  PT - OK to Discharge: Yes     03/05/25 1302   PT  Visit   PT Received On 03/05/25   Response to Previous Treatment Patient with no complaints from previous session.   General   Reason for Referral Fall L Lateral Tibial Plateau Fx   Referred By Kelvin Cuellar MD   Past Medical History Relevant to Rehab Admitted 2/28/2025 after falling down 8 steps.  Xray left knee (+) lateral tibial plateau fracture.  Ortho consult completed and (-) surgical intervention.  Patient to wear immobilizer and maintain NWB.   Family/Caregiver Present Yes   Prior to Session Communication Bedside nurse   Patient Position Received Bed, 3 rail up   Precautions   LE Weight Bearing Status Left Non-Weight Bearing   Medical Precautions Fall precautions   Braces Applied Immobilizer donned L   Pain Assessment   Pain Assessment 0-10   0-10 (Numeric) Pain Score 7   Pain Type Acute pain   Pain Location Knee   Pain Orientation Right   General Observation   General Observation Pleasant and cooperative.   Therapeutic Exercise   Therapeutic  Exercise Performed Yes   Therapeutic Exercise Activity 1 R LE LAQ, SLR, Hip Abduction x 20 reps each  AP, GQ x 20,  L LE Abduction x 20 with assist for  gravity elimination   Therapeutic Activity   Therapeutic Activity Performed Yes   Balance/Neuromuscular Re-Education   Balance/Neuromuscular Re-Education Activity Performed Yes   Bed Mobility   Bed Mobility Yes   Bed Mobility 1   Bed Mobility 1 Supine to sitting   Level of Assistance 1 Moderate assistance  (x2)   Ambulation/Gait Training   Ambulation/Gait Training Performed Yes   Ambulation/Gait Training 1   Surface 1 Level tile   Device 1 Rolling walker   Gait Support Devices Gait belt   Assistance 1 Minimum assistance  (x2)   Comments/Distance (ft) 1 18  (Is able to maintain NWB L LE chair follow for  safety and hands on assist fro stability)   Transfers   Transfer Yes   Transfer 1   Technique 1 Sit to stand;Stand to sit   Transfer Device 1 Walker;Gait belt   Transfer Level of Assistance 1 Moderate assistance;+2   Trials/Comments 1 x2   PT Assessment   PT Assessment Results Decreased strength;Decreased endurance;Impaired balance;Decreased mobility   Rehab Prognosis Good   End of Session Patient Position Up in chair   Outpatient Education   Individual(s) Educated Patient   Education Provided Fall Risk   PT Plan   Inpatient/Swing Bed or Outpatient Inpatient     Outcome Measures:  [facility] Basic Mobility  Turning from your back to your side while in a flat bed without using bedrails: A lot  Moving from lying on your back to sitting on the side of a flat bed without using bedrails: A lot  Moving to and from bed to chair (including a wheelchair): A lot  Standing up from a chair using your arms (e.g. wheelchair or bedside chair): A lot  To walk in hospital room: A lot  Climbing 3-5 steps with railing: Total  Basic Mobility - Total Score: 11                             EDUCATION:  Outpatient Education  Individual(s) Educated: Patient  Education Provided: Fall  Risk  Education Documentation  No documentation found.  Education Comments  No comments found.        GOALS:  Encounter Problems       Encounter Problems (Active)       Mobility       STG - Patient will ambulate X 20-40 Ft. with a w/w, CGA.         Start:  03/01/25    Expected End:  03/15/25            STG - Patient will ambulate up and down a curb/step with a walker, MIN A x1.         Start:  03/01/25    Expected End:  03/15/25            Goal 1:  DEMO a G=SLR on L.         Start:  03/01/25    Expected End:  03/15/25               PT Transfers       STG - Patient will perform bed mobility with CGA.         Start:  03/01/25    Expected End:  03/15/25            STG - Patient will transfer sit to and from stand into a w/w, CGA.         Start:  03/01/25    Expected End:  03/15/25               Pain - Adult          Safety       STG - Patient uses gait belt during all transfers, curb step trng., and amb. trng..        Start:  03/01/25    Expected End:  03/15/25

## 2025-03-05 NOTE — PROGRESS NOTES
Occupational Therapy    Occupational Therapy    OT Treatment    Patient Name: Sp Morrison  MRN: [id number]  Today's Date: 3/5/2025          4106/4106-A    Assessment:  End of Session Communication: Bedside nurse  End of Session Patient Position: Up in chair, Alarm on       Plan:  OT Frequency: 5 times per week  OT Discharge Recommendations: High intensity level of continued care     Subjective      03/05/25 1053   OT Last Visit   OT Received On 03/05/25   General   Reason for Referral Fall L Lateral Tibial Plateau Fx   Referred By Kelvin Cuellar MD   Past Medical History Relevant to Rehab Admitted 2/28/2025 after falling down 8 steps.  Xray left knee (+) lateral tibial plateau fracture.  Ortho consult completed and (-) surgical intervention.  Patient to wear immobilizer and maintain NWB.   Prior to Session Communication Bedside nurse   Patient Position Received Bed, 3 rail up;Alarm on   General Comment Pt agreeable to tx, cleared for participation.   Precautions   LE Weight Bearing Status Left Non-Weight Bearing   Medical Precautions Fall precautions   Braces Applied Immobilizer donned L   Pain Assessment   Pain Assessment 0-10   0-10 (Numeric) Pain Score   (Pt states 10/10 pain in R knee, nursing aware.)   Pain Type Acute pain   Pain Location Knee   Pain Orientation Right   Pain Interventions Rest;Repositioned   Cognition   Orientation Level Oriented X4   Grooming   Grooming Level of Assistance Close supervision   Grooming Where Assessed Sitting sinkside   Grooming Comments Pt completed oral care while seated in recliner at sink, cued to scoot bottom to edge of recliner for heel to rest on floor to ease discomfort with immobilizer.   UE Dressing   UE Dressing Level of Assistance Minimum assistance   UE Dressing Where Assessed Edge of bed   UE Dressing Comments Pt doffed/donned gown while seated, second gown donned around back to use as robe, assist to thread arms through.   LE Dressing   LE Dressing    (Educated pt in comp techniques for LB dressing, doffed/donned undergarments while seated on BSC. Max A needed to complete task and pull up garment while in stance.)   LE Dressing Where Assessed Bedside commode   Toileting   Where Assessed Bedside commode   Toileting Comments Educated pt in use of BSC instead of bed pan with verbal understanding and agreement. Pt trialed using BSC with no output.   Bed Mobility 1   Bed Mobility 1 Supine to sitting   Level of Assistance 1 Minimum assistance;+2   Bed Mobility Comments 1 HOB elevated   Functional Mobility   Functional Mobility Performed   (Pt ambulated in room short distance with gait belt, fww and chair follow. Min A x2)   Transfer 1   Technique 1 Sit to stand;Stand to sit   Transfer Device 1 Walker;Gait belt   Transfer Level of Assistance 1 Moderate assistance;+2;Minimal verbal cues;Minimum assistance   Trials/Comments 1 Pt completed STS at EOB, BSC and recliner level, Min A at recliner and BSC level. Cues for proper hand placement with good follow through.   Transfers 2   Technique 2 Stand pivot;To right;To left   Transfer Device 2 Walker;Gait belt   Transfer Level of Assistance 2 Minimum assistance;+2;Minimal verbal cues   Trials/Comments 2 Stand pivot from recliner to BSC, BSC to recliner, cues for proper hand placement and to keep L leg up to maintain precautions with good follow through.   IP OT Assessment   End of Session Communication Bedside nurse   End of Session Patient Position Up in chair;Alarm on   Inpatient Plan   OT Frequency 5 times per week   OT Discharge Recommendations High intensity level of continued care     Outcome Measures:[facility] Daily Activity  Putting on and taking off regular lower body clothing: Total  Bathing (including washing, rinsing, drying): A lot  Putting on and taking off regular upper body clothing: A little  Toileting, which includes using toilet, bedpan or urinal: A lot  Taking care of personal grooming such as brushing teeth:  A little  Eating Meals: None  Daily Activity - Total Score: 15  Education Documentation  No documentation found.  Education Comments  No comments found.            Goals:  Encounter Problems       Encounter Problems (Active)       Dressings Lower Extremities       STG - Patient will complete lower body dressing with mod assist with comp strategies and AE to maintain left LE WB status (Progressing)       Start:  03/01/25    Expected End:  03/15/25               Functional Balance       STG-Patient will be min assist with assistive device dynamic stand task >3 minutes for ADL completion maintaining left LE WB status (Progressing)       Start:  03/01/25    Expected End:  03/15/25               Functional Mobility       STG-Patient will be min assist with assistive device functional mobility tasks  maintaining left LE WB status (Progressing)       Start:  03/01/25    Expected End:  03/15/25               OT Transfers       STG-Patient will be min assist with functional transfers demonstrating good safety  (maintaining left LE WB status) (Progressing)       Start:  03/01/25    Expected End:  03/15/25               Safety       STG-Patient will independently verbalize left LE WB precautions with all functional tasks   (Progressing)       Start:  03/01/25    Expected End:  03/15/25                   REEMA WHITESIDE was present for supervision of this student during the treatment and documentation of this patient. ERMELINDA Patterson/JOSEFINA

## 2025-03-05 NOTE — PROGRESS NOTES
Sp Morrison is a 60 y.o. male on day 4 of admission presenting with Tibial plateau fracture, left.      Subjective   Patient is feeling well, no N/V, no CP or SOB.      Objective          Vitals 24HR  Heart Rate:  []   Temp:  [36.2 °C (97.2 °F)-37 °C (98.6 °F)]   Resp:  [17-18]   BP: (109-132)/(77-89)   SpO2:  [91 %-97 %]     Intake/Output last 3 Shifts:    Intake/Output Summary (Last 24 hours) at 3/5/2025 1638  Last data filed at 3/5/2025 0800  Gross per 24 hour   Intake 360 ml   Output 1000 ml   Net -640 ml       Physical Exam  GENERAL: No distress.   SKIN: No rashes or lesions.  EYES: PERRLA, EOMI  HEENT: Head: Normocephalic  Neck: supple and no adenopathy  LUNGS: Lungs clear to auscultation. Good diaphragmatic excursion.  CARDIAC: Normal S1 and S2; no rubs, murmurs, or gallops  ABDOMEN: Abdomen soft, non-tender. BS normal.   EXTREMITIES: No ulcers, Extremities normal.   NEURO: No focal deficit.     Relevant Results             Scheduled medications  acetaminophen, 975 mg, oral, q8h  ALPRAZolam, 0.5 mg, oral, Daily  cinacalcet, 30 mg, oral, Daily with breakfast  dorzolamide-timoloL, 1 drop, Both Eyes, BID  folic acid, 1 mg, oral, Daily  heparin (porcine), 5,000 Units, subcutaneous, q8h  latanoprost, 1 drop, Both Eyes, Nightly  levothyroxine, 150 mcg, oral, Daily  multivitamin with minerals, 1 tablet, oral, Daily  pantoprazole, 40 mg, oral, Daily before breakfast  polyethylene glycol, 17 g, oral, BID  QUEtiapine, 100 mg, oral, Nightly  sennosides-docusate sodium, 2 tablet, oral, BID  thiamine, 100 mg, oral, Daily      Continuous medications         PRN medications  PRN medications: albuterol, melatonin, metoprolol, morphine, ondansetron, oxyCODONE  No results found for this or any previous visit (from the past 24 hours).        Assessment/Plan              Assessment & Plan  Fall, initial encounter    Hypercalcemia    Fall    Elevated lactic acid level    Tibial plateau fracture,  left    Lipohemarthrosis      60 y.o. male   PMH; esophageal varices, hypothyroidism, HLP, liver cirrhosis.   Patient is presenting with left knee pain.  He reportedly fell on steps last night when he was intoxicated.  He denies loss of consciousness but reportedly did hit his head.  He was brought by EMS to [facility] where [place]-rays were obtained demonstrating a lateral tibial plateau fracture.  Orthopedics was consulted.  The patient was subsequently admitted to the hospital.  He has undergone previous surgery on this knee with an ACL reconstruction and some form of meniscal surgery back in 2014.      // Left lateral tibial plateau fracture  Ortho is on consult.   PT/OT     Awaiting pre cert for dc.        // Hypercalcemia  // Hyperparathyroidism.   - Ionized Ca; 1.44  - PTH; 94  - 25D; 28  - 1,25D; 87   - Phos; 2.3  - Check K/L  - Check SPEP with IF  - IVF;   TSH and cortisol, not significant. Has hypothyro with elevated TPO.   Will start cinacalcet 30 every day       Kelvin Cuellar MD

## 2025-03-06 ENCOUNTER — ANESTHESIA (OUTPATIENT)
Dept: OPERATING ROOM | Facility: HOSPITAL | Age: 60
End: 2025-03-06
Payer: COMMERCIAL

## 2025-03-06 ENCOUNTER — ANESTHESIA EVENT (OUTPATIENT)
Dept: OPERATING ROOM | Facility: HOSPITAL | Age: 60
End: 2025-03-06
Payer: COMMERCIAL

## 2025-03-06 ENCOUNTER — APPOINTMENT (OUTPATIENT)
Dept: RADIOLOGY | Facility: HOSPITAL | Age: 60
End: 2025-03-06
Payer: COMMERCIAL

## 2025-03-06 LAB
ABO GROUP (TYPE) IN BLOOD: NORMAL
ANION GAP SERPL CALC-SCNC: 13 MMOL/L (ref 10–20)
ANTIBODY SCREEN: NORMAL
BUN SERPL-MCNC: 13 MG/DL (ref 6–23)
CALCIUM SERPL-MCNC: 11.4 MG/DL (ref 8.6–10.3)
CHLORIDE SERPL-SCNC: 102 MMOL/L (ref 98–107)
CO2 SERPL-SCNC: 25 MMOL/L (ref 21–32)
CREAT SERPL-MCNC: 0.89 MG/DL (ref 0.5–1.3)
EGFRCR SERPLBLD CKD-EPI 2021: >90 ML/MIN/1.73M*2
ERYTHROCYTE [DISTWIDTH] IN BLOOD BY AUTOMATED COUNT: 12.9 % (ref 11.5–14.5)
GLUCOSE SERPL-MCNC: 104 MG/DL (ref 74–99)
HCT VFR BLD AUTO: 43.3 % (ref 41–52)
HGB BLD-MCNC: 14.4 G/DL (ref 13.5–17.5)
KAPPA LC SERPL-MCNC: 1.61 MG/DL (ref 0.33–1.94)
KAPPA LC/LAMBDA SER: 0.98 {RATIO} (ref 0.26–1.65)
LAMBDA LC SERPL-MCNC: 1.65 MG/DL (ref 0.57–2.63)
MCH RBC QN AUTO: 31.2 PG (ref 26–34)
MCHC RBC AUTO-ENTMCNC: 33.3 G/DL (ref 32–36)
MCV RBC AUTO: 94 FL (ref 80–100)
NRBC BLD-RTO: 0 /100 WBCS (ref 0–0)
PLATELET # BLD AUTO: 412 X10*3/UL (ref 150–450)
POTASSIUM SERPL-SCNC: 4.2 MMOL/L (ref 3.5–5.3)
PTH RELATED PROT SERPL-SCNC: <0.5 PMOL/L
RBC # BLD AUTO: 4.61 X10*6/UL (ref 4.5–5.9)
RH FACTOR (ANTIGEN D): NORMAL
SODIUM SERPL-SCNC: 136 MMOL/L (ref 136–145)
WBC # BLD AUTO: 8.6 X10*3/UL (ref 4.4–11.3)

## 2025-03-06 PROCEDURE — 2500000004 HC RX 250 GENERAL PHARMACY W/ HCPCS (ALT 636 FOR OP/ED): Mod: JZ | Performed by: ANESTHESIOLOGY

## 2025-03-06 PROCEDURE — 3600000009 HC OR TIME - EACH INCREMENTAL 1 MINUTE - PROCEDURE LEVEL FOUR: Performed by: ORTHOPAEDIC SURGERY

## 2025-03-06 PROCEDURE — 2780000003 HC OR 278 NO HCPCS: Performed by: ORTHOPAEDIC SURGERY

## 2025-03-06 PROCEDURE — 2500000001 HC RX 250 WO HCPCS SELF ADMINISTERED DRUGS (ALT 637 FOR MEDICARE OP): Performed by: ORTHOPAEDIC SURGERY

## 2025-03-06 PROCEDURE — 0QSH04Z REPOSITION LEFT TIBIA WITH INTERNAL FIXATION DEVICE, OPEN APPROACH: ICD-10-PCS | Performed by: ORTHOPAEDIC SURGERY

## 2025-03-06 PROCEDURE — 85027 COMPLETE CBC AUTOMATED: CPT | Performed by: NURSE PRACTITIONER

## 2025-03-06 PROCEDURE — 36415 COLL VENOUS BLD VENIPUNCTURE: CPT | Performed by: NURSE PRACTITIONER

## 2025-03-06 PROCEDURE — 97116 GAIT TRAINING THERAPY: CPT | Mod: GP,CQ

## 2025-03-06 PROCEDURE — A27535 PR OPEN TX TIBIAL FRACTURE PROXIMAL UNICONDYLAR: Performed by: ANESTHESIOLOGIST ASSISTANT

## 2025-03-06 PROCEDURE — 2500000004 HC RX 250 GENERAL PHARMACY W/ HCPCS (ALT 636 FOR OP/ED): Performed by: ORTHOPAEDIC SURGERY

## 2025-03-06 PROCEDURE — 7100000002 HC RECOVERY ROOM TIME - EACH INCREMENTAL 1 MINUTE: Performed by: ORTHOPAEDIC SURGERY

## 2025-03-06 PROCEDURE — 2500000005 HC RX 250 GENERAL PHARMACY W/O HCPCS: Performed by: ANESTHESIOLOGY

## 2025-03-06 PROCEDURE — C1769 GUIDE WIRE: HCPCS | Performed by: ORTHOPAEDIC SURGERY

## 2025-03-06 PROCEDURE — 80048 BASIC METABOLIC PNL TOTAL CA: CPT | Performed by: NURSE PRACTITIONER

## 2025-03-06 PROCEDURE — 7100000001 HC RECOVERY ROOM TIME - INITIAL BASE CHARGE: Performed by: ORTHOPAEDIC SURGERY

## 2025-03-06 PROCEDURE — 3600000004 HC OR TIME - INITIAL BASE CHARGE - PROCEDURE LEVEL FOUR: Performed by: ORTHOPAEDIC SURGERY

## 2025-03-06 PROCEDURE — 3700000002 HC GENERAL ANESTHESIA TIME - EACH INCREMENTAL 1 MINUTE: Performed by: ORTHOPAEDIC SURGERY

## 2025-03-06 PROCEDURE — 3700000001 HC GENERAL ANESTHESIA TIME - INITIAL BASE CHARGE: Performed by: ORTHOPAEDIC SURGERY

## 2025-03-06 PROCEDURE — 2500000002 HC RX 250 W HCPCS SELF ADMINISTERED DRUGS (ALT 637 FOR MEDICARE OP, ALT 636 FOR OP/ED): Performed by: INTERNAL MEDICINE

## 2025-03-06 PROCEDURE — 2500000001 HC RX 250 WO HCPCS SELF ADMINISTERED DRUGS (ALT 637 FOR MEDICARE OP): Performed by: NURSE PRACTITIONER

## 2025-03-06 PROCEDURE — 97535 SELF CARE MNGMENT TRAINING: CPT | Mod: GO,CO

## 2025-03-06 PROCEDURE — 0QSHXZZ REPOSITION LEFT TIBIA, EXTERNAL APPROACH: ICD-10-PCS | Performed by: ORTHOPAEDIC SURGERY

## 2025-03-06 PROCEDURE — 2500000004 HC RX 250 GENERAL PHARMACY W/ HCPCS (ALT 636 FOR OP/ED): Performed by: NURSE PRACTITIONER

## 2025-03-06 PROCEDURE — 86900 BLOOD TYPING SEROLOGIC ABO: CPT | Performed by: NURSE PRACTITIONER

## 2025-03-06 PROCEDURE — 2720000007 HC OR 272 NO HCPCS: Performed by: ORTHOPAEDIC SURGERY

## 2025-03-06 PROCEDURE — C1713 ANCHOR/SCREW BN/BN,TIS/BN: HCPCS | Performed by: ORTHOPAEDIC SURGERY

## 2025-03-06 PROCEDURE — A27535 PR OPEN TX TIBIAL FRACTURE PROXIMAL UNICONDYLAR: Performed by: ANESTHESIOLOGY

## 2025-03-06 PROCEDURE — 97110 THERAPEUTIC EXERCISES: CPT | Mod: GP,CQ

## 2025-03-06 PROCEDURE — 2500000004 HC RX 250 GENERAL PHARMACY W/ HCPCS (ALT 636 FOR OP/ED): Mod: JZ | Performed by: ANESTHESIOLOGIST ASSISTANT

## 2025-03-06 PROCEDURE — 1100000001 HC PRIVATE ROOM DAILY

## 2025-03-06 PROCEDURE — 2500000001 HC RX 250 WO HCPCS SELF ADMINISTERED DRUGS (ALT 637 FOR MEDICARE OP)

## 2025-03-06 PROCEDURE — 2500000002 HC RX 250 W HCPCS SELF ADMINISTERED DRUGS (ALT 637 FOR MEDICARE OP, ALT 636 FOR OP/ED): Performed by: NURSE PRACTITIONER

## 2025-03-06 PROCEDURE — 86901 BLOOD TYPING SEROLOGIC RH(D): CPT | Performed by: NURSE PRACTITIONER

## 2025-03-06 PROCEDURE — 2500000002 HC RX 250 W HCPCS SELF ADMINISTERED DRUGS (ALT 637 FOR MEDICARE OP, ALT 636 FOR OP/ED): Performed by: ORTHOPAEDIC SURGERY

## 2025-03-06 DEVICE — IMPLANTABLE DEVICE: Type: IMPLANTABLE DEVICE | Site: TIBIA | Status: FUNCTIONAL

## 2025-03-06 DEVICE — WASHER, F/LARGE SCREWS, 13 MM, STAINLESS STEEL: Type: IMPLANTABLE DEVICE | Site: TIBIA | Status: FUNCTIONAL

## 2025-03-06 DEVICE — IMPLANTABLE DEVICE: Type: IMPLANTABLE DEVICE | Site: TIBIA | Status: NON-FUNCTIONAL

## 2025-03-06 RX ORDER — CEFAZOLIN SODIUM 2 G/100ML
INJECTION, SOLUTION INTRAVENOUS AS NEEDED
Status: DISCONTINUED | OUTPATIENT
Start: 2025-03-06 | End: 2025-03-06

## 2025-03-06 RX ORDER — PHENYLEPHRINE HCL IN 0.9% NACL 1 MG/10 ML
SYRINGE (ML) INTRAVENOUS AS NEEDED
Status: DISCONTINUED | OUTPATIENT
Start: 2025-03-06 | End: 2025-03-06

## 2025-03-06 RX ORDER — NALOXONE HYDROCHLORIDE 0.4 MG/ML
0.2 INJECTION, SOLUTION INTRAMUSCULAR; INTRAVENOUS; SUBCUTANEOUS EVERY 5 MIN PRN
Status: DISCONTINUED | OUTPATIENT
Start: 2025-03-06 | End: 2025-03-08 | Stop reason: HOSPADM

## 2025-03-06 RX ORDER — HYDROCODONE BITARTRATE AND ACETAMINOPHEN 5; 325 MG/1; MG/1
1 TABLET ORAL EVERY 4 HOURS PRN
Status: DISCONTINUED | OUTPATIENT
Start: 2025-03-06 | End: 2025-03-06 | Stop reason: HOSPADM

## 2025-03-06 RX ORDER — MIDAZOLAM HYDROCHLORIDE 1 MG/ML
INJECTION, SOLUTION INTRAMUSCULAR; INTRAVENOUS AS NEEDED
Status: DISCONTINUED | OUTPATIENT
Start: 2025-03-06 | End: 2025-03-06

## 2025-03-06 RX ORDER — HYDROMORPHONE HYDROCHLORIDE 1 MG/ML
1 INJECTION, SOLUTION INTRAMUSCULAR; INTRAVENOUS; SUBCUTANEOUS EVERY 5 MIN PRN
Status: DISCONTINUED | OUTPATIENT
Start: 2025-03-06 | End: 2025-03-06 | Stop reason: HOSPADM

## 2025-03-06 RX ORDER — PROPOFOL 10 MG/ML
INJECTION, EMULSION INTRAVENOUS AS NEEDED
Status: DISCONTINUED | OUTPATIENT
Start: 2025-03-06 | End: 2025-03-06

## 2025-03-06 RX ORDER — OXYCODONE HYDROCHLORIDE 10 MG/1
10 TABLET ORAL EVERY 4 HOURS PRN
Status: DISCONTINUED | OUTPATIENT
Start: 2025-03-06 | End: 2025-03-08 | Stop reason: HOSPADM

## 2025-03-06 RX ORDER — HYDRALAZINE HYDROCHLORIDE 20 MG/ML
5 INJECTION INTRAMUSCULAR; INTRAVENOUS EVERY 30 MIN PRN
Status: DISCONTINUED | OUTPATIENT
Start: 2025-03-06 | End: 2025-03-06 | Stop reason: HOSPADM

## 2025-03-06 RX ORDER — FENTANYL CITRATE 50 UG/ML
INJECTION, SOLUTION INTRAMUSCULAR; INTRAVENOUS AS NEEDED
Status: DISCONTINUED | OUTPATIENT
Start: 2025-03-06 | End: 2025-03-06

## 2025-03-06 RX ORDER — DIPHENHYDRAMINE HYDROCHLORIDE 50 MG/ML
12.5 INJECTION INTRAMUSCULAR; INTRAVENOUS ONCE AS NEEDED
Status: DISCONTINUED | OUTPATIENT
Start: 2025-03-06 | End: 2025-03-06 | Stop reason: HOSPADM

## 2025-03-06 RX ORDER — METOCLOPRAMIDE HYDROCHLORIDE 5 MG/ML
10 INJECTION INTRAMUSCULAR; INTRAVENOUS ONCE AS NEEDED
Status: DISCONTINUED | OUTPATIENT
Start: 2025-03-06 | End: 2025-03-06 | Stop reason: HOSPADM

## 2025-03-06 RX ORDER — ALBUTEROL SULFATE 0.83 MG/ML
2.5 SOLUTION RESPIRATORY (INHALATION)
Status: DISCONTINUED | OUTPATIENT
Start: 2025-03-06 | End: 2025-03-06 | Stop reason: HOSPADM

## 2025-03-06 RX ORDER — ENOXAPARIN SODIUM 100 MG/ML
30 INJECTION SUBCUTANEOUS EVERY 12 HOURS
Status: DISCONTINUED | OUTPATIENT
Start: 2025-03-06 | End: 2025-03-08 | Stop reason: HOSPADM

## 2025-03-06 RX ORDER — OXYCODONE HYDROCHLORIDE 5 MG/1
5 TABLET ORAL EVERY 6 HOURS PRN
Status: DISCONTINUED | OUTPATIENT
Start: 2025-03-06 | End: 2025-03-08 | Stop reason: HOSPADM

## 2025-03-06 RX ORDER — LIDOCAINE HYDROCHLORIDE 20 MG/ML
INJECTION, SOLUTION EPIDURAL; INFILTRATION; INTRACAUDAL; PERINEURAL AS NEEDED
Status: DISCONTINUED | OUTPATIENT
Start: 2025-03-06 | End: 2025-03-06

## 2025-03-06 RX ORDER — METOPROLOL TARTRATE 1 MG/ML
INJECTION, SOLUTION INTRAVENOUS AS NEEDED
Status: DISCONTINUED | OUTPATIENT
Start: 2025-03-06 | End: 2025-03-06

## 2025-03-06 RX ORDER — LABETALOL HYDROCHLORIDE 5 MG/ML
5 INJECTION, SOLUTION INTRAVENOUS
Status: DISCONTINUED | OUTPATIENT
Start: 2025-03-06 | End: 2025-03-06 | Stop reason: HOSPADM

## 2025-03-06 RX ORDER — ONDANSETRON HYDROCHLORIDE 2 MG/ML
INJECTION, SOLUTION INTRAVENOUS AS NEEDED
Status: DISCONTINUED | OUTPATIENT
Start: 2025-03-06 | End: 2025-03-06

## 2025-03-06 RX ORDER — HYDROMORPHONE HYDROCHLORIDE 1 MG/ML
INJECTION, SOLUTION INTRAMUSCULAR; INTRAVENOUS; SUBCUTANEOUS AS NEEDED
Status: DISCONTINUED | OUTPATIENT
Start: 2025-03-06 | End: 2025-03-06

## 2025-03-06 RX ORDER — SENNOSIDES 8.6 MG/1
2 TABLET ORAL 2 TIMES DAILY
Status: DISCONTINUED | OUTPATIENT
Start: 2025-03-06 | End: 2025-03-08 | Stop reason: HOSPADM

## 2025-03-06 RX ORDER — SODIUM CHLORIDE, SODIUM LACTATE, POTASSIUM CHLORIDE, CALCIUM CHLORIDE 600; 310; 30; 20 MG/100ML; MG/100ML; MG/100ML; MG/100ML
100 INJECTION, SOLUTION INTRAVENOUS CONTINUOUS
Status: DISCONTINUED | OUTPATIENT
Start: 2025-03-06 | End: 2025-03-06 | Stop reason: HOSPADM

## 2025-03-06 RX ORDER — OXYCODONE AND ACETAMINOPHEN 5; 325 MG/1; MG/1
0.5 TABLET ORAL EVERY 4 HOURS PRN
Status: DISCONTINUED | OUTPATIENT
Start: 2025-03-06 | End: 2025-03-08 | Stop reason: HOSPADM

## 2025-03-06 RX ORDER — ALPRAZOLAM 0.5 MG/1
0.5 TABLET ORAL DAILY
Status: DISCONTINUED | OUTPATIENT
Start: 2025-03-06 | End: 2025-03-08 | Stop reason: HOSPADM

## 2025-03-06 RX ORDER — ROCURONIUM BROMIDE 10 MG/ML
INJECTION, SOLUTION INTRAVENOUS AS NEEDED
Status: DISCONTINUED | OUTPATIENT
Start: 2025-03-06 | End: 2025-03-06

## 2025-03-06 RX ORDER — MIDAZOLAM HYDROCHLORIDE 1 MG/ML
1 INJECTION, SOLUTION INTRAMUSCULAR; INTRAVENOUS ONCE AS NEEDED
Status: DISCONTINUED | OUTPATIENT
Start: 2025-03-06 | End: 2025-03-06 | Stop reason: HOSPADM

## 2025-03-06 RX ADMIN — Medication 3 L/MIN: at 19:00

## 2025-03-06 RX ADMIN — ACETAMINOPHEN 325MG 975 MG: 325 TABLET ORAL at 06:08

## 2025-03-06 RX ADMIN — HYDROMORPHONE HYDROCHLORIDE 1 MG: 1 INJECTION, SOLUTION INTRAMUSCULAR; INTRAVENOUS; SUBCUTANEOUS at 19:07

## 2025-03-06 RX ADMIN — QUETIAPINE FUMARATE 100 MG: 100 TABLET ORAL at 21:05

## 2025-03-06 RX ADMIN — MIDAZOLAM 2 MG: 1 INJECTION INTRAMUSCULAR; INTRAVENOUS at 17:35

## 2025-03-06 RX ADMIN — METOPROLOL TARTRATE 3 MG: 5 INJECTION INTRAVENOUS at 18:39

## 2025-03-06 RX ADMIN — Medication 1 TABLET: at 08:11

## 2025-03-06 RX ADMIN — HYDROMORPHONE HYDROCHLORIDE 1 MG: 1 INJECTION, SOLUTION INTRAMUSCULAR; INTRAVENOUS; SUBCUTANEOUS at 18:54

## 2025-03-06 RX ADMIN — LIDOCAINE HYDROCHLORIDE 100 MG: 20 INJECTION, SOLUTION EPIDURAL; INFILTRATION; INTRACAUDAL; PERINEURAL at 17:43

## 2025-03-06 RX ADMIN — PROPOFOL 200 MG: 10 INJECTION, EMULSION INTRAVENOUS at 17:43

## 2025-03-06 RX ADMIN — FENTANYL CITRATE 50 MCG: 50 INJECTION, SOLUTION INTRAMUSCULAR; INTRAVENOUS at 17:43

## 2025-03-06 RX ADMIN — ENOXAPARIN SODIUM 30 MG: 30 INJECTION SUBCUTANEOUS at 21:08

## 2025-03-06 RX ADMIN — LATANOPROST 1 DROP: 50 SOLUTION OPHTHALMIC at 21:08

## 2025-03-06 RX ADMIN — CINACALCET HYDROCHLORIDE 30 MG: 30 TABLET, FILM COATED ORAL at 08:11

## 2025-03-06 RX ADMIN — POLYETHYLENE GLYCOL 3350 17 G: 17 POWDER, FOR SOLUTION ORAL at 21:08

## 2025-03-06 RX ADMIN — STANDARDIZED SENNA CONCENTRATE 17.2 MG: 8.6 TABLET ORAL at 21:08

## 2025-03-06 RX ADMIN — SENNOSIDES AND DOCUSATE SODIUM 2 TABLET: 50; 8.6 TABLET ORAL at 21:04

## 2025-03-06 RX ADMIN — PANTOPRAZOLE SODIUM 40 MG: 40 TABLET, DELAYED RELEASE ORAL at 06:08

## 2025-03-06 RX ADMIN — ACETAMINOPHEN 325MG 975 MG: 325 TABLET ORAL at 21:06

## 2025-03-06 RX ADMIN — FENTANYL CITRATE 50 MCG: 50 INJECTION, SOLUTION INTRAMUSCULAR; INTRAVENOUS at 18:07

## 2025-03-06 RX ADMIN — ACETAMINOPHEN 325MG 975 MG: 325 TABLET ORAL at 13:08

## 2025-03-06 RX ADMIN — CEFAZOLIN SODIUM 2 G: 2 INJECTION, SOLUTION INTRAVENOUS at 17:53

## 2025-03-06 RX ADMIN — SENNOSIDES AND DOCUSATE SODIUM 2 TABLET: 50; 8.6 TABLET ORAL at 08:11

## 2025-03-06 RX ADMIN — HYDROMORPHONE HYDROCHLORIDE 0.5 MG: 1 INJECTION, SOLUTION INTRAMUSCULAR; INTRAVENOUS; SUBCUTANEOUS at 18:37

## 2025-03-06 RX ADMIN — ROCURONIUM BROMIDE 50 MG: 10 INJECTION INTRAVENOUS at 17:43

## 2025-03-06 RX ADMIN — SUGAMMADEX 200 MG: 100 INJECTION, SOLUTION INTRAVENOUS at 18:38

## 2025-03-06 RX ADMIN — LEVOTHYROXINE SODIUM 150 MCG: 150 TABLET ORAL at 08:11

## 2025-03-06 RX ADMIN — HYDROMORPHONE HYDROCHLORIDE 0.5 MG: 1 INJECTION, SOLUTION INTRAMUSCULAR; INTRAVENOUS; SUBCUTANEOUS at 18:25

## 2025-03-06 RX ADMIN — OXYCODONE HYDROCHLORIDE 10 MG: 10 TABLET ORAL at 21:04

## 2025-03-06 RX ADMIN — THIAMINE HCL TAB 100 MG 100 MG: 100 TAB at 08:11

## 2025-03-06 RX ADMIN — Medication 100 MCG: at 17:52

## 2025-03-06 RX ADMIN — POLYETHYLENE GLYCOL 3350 17 G: 17 POWDER, FOR SOLUTION ORAL at 08:11

## 2025-03-06 RX ADMIN — SODIUM CHLORIDE, POTASSIUM CHLORIDE, SODIUM LACTATE AND CALCIUM CHLORIDE: 600; 310; 30; 20 INJECTION, SOLUTION INTRAVENOUS at 17:35

## 2025-03-06 RX ADMIN — HYDROMORPHONE HYDROCHLORIDE 0.5 MG: 1 INJECTION, SOLUTION INTRAMUSCULAR; INTRAVENOUS; SUBCUTANEOUS at 18:43

## 2025-03-06 RX ADMIN — METOPROLOL TARTRATE 2 MG: 5 INJECTION INTRAVENOUS at 18:30

## 2025-03-06 RX ADMIN — DORZOLAMIDE HYDROCHLORIDE AND TIMOLOL MALEATE 1 DROP: 20; 5 SOLUTION/ DROPS OPHTHALMIC at 08:11

## 2025-03-06 RX ADMIN — FOLIC ACID 1 MG: 1 TABLET ORAL at 08:11

## 2025-03-06 RX ADMIN — ONDANSETRON 4 MG: 2 INJECTION INTRAMUSCULAR; INTRAVENOUS at 18:09

## 2025-03-06 RX ADMIN — HYDROMORPHONE HYDROCHLORIDE 0.5 MG: 1 INJECTION, SOLUTION INTRAMUSCULAR; INTRAVENOUS; SUBCUTANEOUS at 18:18

## 2025-03-06 RX ADMIN — OXYCODONE HYDROCHLORIDE 5 MG: 5 TABLET ORAL at 06:08

## 2025-03-06 RX ADMIN — ALPRAZOLAM 0.5 MG: 0.5 TABLET ORAL at 21:11

## 2025-03-06 RX ADMIN — Medication 100 MCG: at 17:48

## 2025-03-06 SDOH — HEALTH STABILITY: MENTAL HEALTH: CURRENT SMOKER: 0

## 2025-03-06 ASSESSMENT — PAIN DESCRIPTION - ORIENTATION
ORIENTATION: LEFT
ORIENTATION: LEFT

## 2025-03-06 ASSESSMENT — PAIN SCALES - PAIN ASSESSMENT IN ADVANCED DEMENTIA (PAINAD)
CONSOLABILITY: NO NEED TO CONSOLE
BODYLANGUAGE: RELAXED
FACIALEXPRESSION: SMILING OR INEXPRESSIVE
TOTALSCORE: MEDICATION (SEE MAR)
TOTALSCORE: 0
BREATHING: NORMAL

## 2025-03-06 ASSESSMENT — PAIN - FUNCTIONAL ASSESSMENT
PAIN_FUNCTIONAL_ASSESSMENT: 0-10

## 2025-03-06 ASSESSMENT — PAIN DESCRIPTION - DESCRIPTORS: DESCRIPTORS: SHARP;SORE;THROBBING

## 2025-03-06 ASSESSMENT — COGNITIVE AND FUNCTIONAL STATUS - GENERAL
STANDING UP FROM CHAIR USING ARMS: A LITTLE
TURNING FROM BACK TO SIDE WHILE IN FLAT BAD: A LOT
MOBILITY SCORE: 13
PERSONAL GROOMING: A LITTLE
TOILETING: A LOT
MOVING TO AND FROM BED TO CHAIR: A LITTLE
CLIMB 3 TO 5 STEPS WITH RAILING: TOTAL
DAILY ACTIVITIY SCORE: 15
WALKING IN HOSPITAL ROOM: A LOT
HELP NEEDED FOR BATHING: A LOT
DRESSING REGULAR LOWER BODY CLOTHING: TOTAL
DRESSING REGULAR UPPER BODY CLOTHING: A LITTLE
MOVING FROM LYING ON BACK TO SITTING ON SIDE OF FLAT BED WITH BEDRAILS: A LOT

## 2025-03-06 ASSESSMENT — PAIN SCALES - GENERAL
PAINLEVEL_OUTOF10: 0 - NO PAIN
PAINLEVEL_OUTOF10: 10 - WORST POSSIBLE PAIN
PAINLEVEL_OUTOF10: 8
PAINLEVEL_OUTOF10: 8
PAINLEVEL_OUTOF10: 10 - WORST POSSIBLE PAIN
PAINLEVEL_OUTOF10: 10 - WORST POSSIBLE PAIN
PAINLEVEL_OUTOF10: 8
PAINLEVEL_OUTOF10: 9

## 2025-03-06 ASSESSMENT — PAIN DESCRIPTION - LOCATION
LOCATION: LEG
LOCATION: LEG

## 2025-03-06 ASSESSMENT — ACTIVITIES OF DAILY LIVING (ADL): HOME_MANAGEMENT_TIME_ENTRY: 27

## 2025-03-06 NOTE — PROGRESS NOTES
Physical Therapy    Physical Therapy    Physical Therapy Treatment    Patient Name: Sp Morrison  MRN: [id number]  Today's Date: 3/6/2025  Time Calculation  Start Time: 1046  Stop Time: 1118  Time Calculation (min): 32 min     4106/4106-A    Assessment/Plan   PT Assessment  PT Assessment Results: Decreased strength, Decreased endurance, Impaired balance, Decreased mobility  Rehab Prognosis: Good  End of Session Patient Position: Up in chair  PT Plan  Inpatient/Swing Bed or Outpatient: Inpatient  PT Plan  Treatment/Interventions: Bed mobility, Transfer training, Gait training, Stair training, Strengthening, Endurance training, Therapeutic exercise, Therapeutic activity, Home exercise program  PT Plan: Ongoing PT  PT Frequency: Daily  PT Discharge Recommendations: High intensity level of continued care  Equipment Recommended upon Discharge: Wheeled walker, Wheelchair  PT Recommended Transfer Status: Assist x2  PT - OK to Discharge: Yes     03/06/25 1046   PT  Visit   PT Received On 03/06/25   Response to Previous Treatment Patient with no complaints from previous session.   General   Reason for Referral Fall L Lateral Tibial Plateau Fx   Referred By Kelvin Cuellar MD   Past Medical History Relevant to Rehab Admitted 2/28/2025 after falling down 8 steps.  Xray left knee (+) lateral tibial plateau fracture.  Ortho consult completed and (-) surgical intervention.  Patient to wear immobilizer and maintain NWB.   Family/Caregiver Present Yes   Prior to Session Communication Bedside nurse   Patient Position Received Bed, 3 rail up   Precautions   LE Weight Bearing Status Left Non-Weight Bearing   Medical Precautions Fall precautions   Braces Applied Immobilizer donned L   Pain Assessment   Pain Assessment 0-10   0-10 (Numeric) Pain Score 8   Pain Type Acute pain   Pain Location Knee   Pain Orientation Right   Cognition   Orientation Level Oriented X4   General Observation   General Observation Pleasant and  cooperative.   Therapeutic Exercise   Therapeutic Exercise Performed Yes   Therapeutic Exercise Activity 1 R LE LAQ, SLR, Hip Abduction x 20 reps each  AP, GQ x 20,  L LE Abduction x 20 with assist for  gravity elimination   Therapeutic Activity   Therapeutic Activity Performed Yes   Balance/Neuromuscular Re-Education   Balance/Neuromuscular Re-Education Activity Performed Yes   Bed Mobility   Bed Mobility Yes   Bed Mobility 1   Bed Mobility 1 Supine to sitting   Level of Assistance 1 Moderate assistance  (x2)   Ambulation/Gait Training   Ambulation/Gait Training Performed Yes   Ambulation/Gait Training 1   Surface 1 Level tile   Device 1 Rolling walker   Gait Support Devices Gait belt   Assistance 1 Minimum assistance  (x2)   Comments/Distance (ft) 1 18   Transfers   Transfer Yes   Transfer 1   Technique 1 Sit to stand;Stand to sit   Transfer Device 1 Walker;Gait belt   Transfer Level of Assistance 1 Moderate assistance;+2   PT Assessment   PT Assessment Results Decreased strength;Decreased endurance;Impaired balance;Decreased mobility   Rehab Prognosis Good   End of Session Patient Position Up in chair   Outpatient Education   Individual(s) Educated Patient   Education Provided Fall Risk   PT Plan   Inpatient/Swing Bed or Outpatient Inpatient     Outcome Measures:  [facility] Basic Mobility  Turning from your back to your side while in a flat bed without using bedrails: A lot  Moving from lying on your back to sitting on the side of a flat bed without using bedrails: A lot  Moving to and from bed to chair (including a wheelchair): A little  Standing up from a chair using your arms (e.g. wheelchair or bedside chair): A little  To walk in hospital room: A lot  Climbing 3-5 steps with railing: Total  Basic Mobility - Total Score: 13                             EDUCATION:  Outpatient Education  Individual(s) Educated: Patient  Education Provided: Fall Risk  Education Documentation  No documentation found.  Education  Comments  No comments found.        GOALS:  Encounter Problems       Encounter Problems (Active)       Mobility       STG - Patient will ambulate X 20-40 Ft. with a w/w, CGA.         Start:  03/01/25    Expected End:  03/15/25            STG - Patient will ambulate up and down a curb/step with a walker, MIN A x1.         Start:  03/01/25    Expected End:  03/15/25            Goal 1:  DEMO a G=SLR on L.         Start:  03/01/25    Expected End:  03/15/25               PT Transfers       STG - Patient will perform bed mobility with CGA.         Start:  03/01/25    Expected End:  03/15/25            STG - Patient will transfer sit to and from stand into a w/w, CGA.         Start:  03/01/25    Expected End:  03/15/25               Pain - Adult          Safety       STG - Patient uses gait belt during all transfers, curb step trng., and amb. trng..        Start:  03/01/25    Expected End:  03/15/25

## 2025-03-06 NOTE — PROGRESS NOTES
03/06/25 1100   Discharge Planning   Home or Post Acute Services Post acute facilities (Rehab/SNF/etc)   Type of Post Acute Facility Services Skilled nursing   Expected Discharge Disposition SNF  (ONNR)     Pre-cert received for ONNR. Pt is now scheduled for surgery today. Facility was updated. SW to follow.

## 2025-03-06 NOTE — CARE PLAN
The patient's goals for the shift include      The clinical goals for the shift include patient will have stable vital signs for the duration of this shift.      Problem: Dressings Lower Extremities  Goal: STG - Patient will complete lower body dressing with mod assist with comp strategies and AE to maintain left LE WB status  Outcome: Progressing     Problem: Pain - Adult  Goal: Verbalizes/displays adequate comfort level or baseline comfort level  Outcome: Progressing     Problem: Safety - Adult  Goal: Free from fall injury  Outcome: Progressing     Problem: Discharge Planning  Goal: Discharge to home or other facility with appropriate resources  Outcome: Progressing     Problem: Chronic Conditions and Co-morbidities  Goal: Patient's chronic conditions and co-morbidity symptoms are monitored and maintained or improved  Outcome: Progressing     Problem: Nutrition  Goal: Nutrient intake appropriate for maintaining nutritional needs  Outcome: Progressing     Problem: Pain  Goal: Takes deep breaths with improved pain control throughout the shift  Outcome: Progressing  Goal: Turns in bed with improved pain control throughout the shift  Outcome: Progressing  Goal: Walks with improved pain control throughout the shift  Outcome: Progressing  Goal: Performs ADL's with improved pain control throughout shift  Outcome: Progressing  Goal: Participates in PT with improved pain control throughout the shift  Outcome: Progressing  Goal: Free from opioid side effects throughout the shift  Outcome: Progressing  Goal: Free from acute confusion related to pain meds throughout the shift  Outcome: Progressing     Problem: Fall/Injury  Goal: Not fall by end of shift  Outcome: Progressing  Goal: Be free from injury by end of the shift  Outcome: Progressing  Goal: Verbalize understanding of personal risk factors for fall in the hospital  Outcome: Progressing  Goal: Verbalize understanding of risk factor reduction measures to prevent injury  from fall in the home  Outcome: Progressing  Goal: Use assistive devices by end of the shift  Outcome: Progressing  Goal: Pace activities to prevent fatigue by end of the shift  Outcome: Progressing     Problem: Skin  Goal: Decreased wound size/increased tissue granulation at next dressing change  Outcome: Progressing  Goal: Participates in plan/prevention/treatment measures  Outcome: Progressing  Goal: Prevent/manage excess moisture  Outcome: Progressing  Goal: Prevent/minimize sheer/friction injuries  Outcome: Progressing  Goal: Promote/optimize nutrition  Outcome: Progressing  Goal: Promote skin healing  Outcome: Progressing

## 2025-03-06 NOTE — CONSULTS
Reason For Consult  Left tibial plateau fracture    History Of Present Illness  Sp Morrison is a 60 y.o. male presenting with fall with resultant pain and inability to ambulate due to pain associated with his left knee.  Patient was seen by [facility] emergency department where he was diagnosed with a comminuted left tibial plateau fracture.  Patient was seen by my partner Dr. Kirby and recommendation was made for nonweightbearing on the left lower extremity and knee immobilizer utilization.  Dr. Kirby reached out to me for my expertise in orthopedic trauma and requested I evaluate patient in regards to potential surgical stabilization of the left tibial plateau fracture.  I am evaluating the patient in his hospital bed.  Patient knowledges substantial pain in his left knee as well as pain in his right knee.  Patient states he has had 3 surgeries on his left knee.  Denies any numbness or tingling currently.     Past Medical History  He has a past medical history of Acquired hypothyroidism (10/09/2015), Anxiety, Chondromalacia of right patella, Chronic prescription benzodiazepine use, Erectile dysfunction, Esophageal varices without bleeding, unspecified esophageal varices type (Multi) (05/23/2024), GERD (gastroesophageal reflux disease), GERD without esophagitis (12/12/2016), Glaucoma, H/O esophageal varices, History of left knee replacement, Hypercalcemia, Hyperlipidemia, Hyperparathyroidism (Multi) (05/23/2024), Hypothyroidism, Insomnia, Left anterior cruciate ligament tear (02/12/2025), Liver cirrhosis (Multi), Recurrent genital herpes (10/25/2013), Rupture of anterior cruciate ligament of left knee, initial encounter, Tear of meniscus of left knee, and Vitamin D deficiency (01/07/2016).    Surgical History  He has a past surgical history that includes Shoulder surgery; Cataract extraction; and Iridotomy / iridectomy.     Social History  He reports that he has been smoking cigarettes.  "He has a 10 pack-year smoking history. He has been exposed to tobacco smoke. He has never used smokeless tobacco. He reports current alcohol use of about 12.0 standard drinks of alcohol per week. He reports that he does not currently use drugs.    Family History  Family History   Problem Relation Name Age of Onset    Alcohol abuse Mother Mother     Depression Mother Mother     Stroke Mother Mother     No Known Problems Father      No Known Problems Sister      No Known Problems Brother      No Known Problems Daughter      No Known Problems Son      No Known Problems Mother's Sister      No Known Problems Mother's Brother      No Known Problems Father's Sister      No Known Problems Father's Brother      No Known Problems Maternal Grandmother      No Known Problems Maternal Grandfather      No Known Problems Paternal Grandmother      No Known Problems Paternal Grandfather      No Known Problems Other          Allergies  Patient has no known allergies.    Review of Systems  Denies current chest pain, shortness of breath, nausea, vomiting     Physical Exam  Left lower extremity: Patient knee immobilizer is in place with no evidence of skin compromise or breakdown.  Patient has neurovascularly intact function of the left lower extremity able to dorsiflex plantarflex extend big toe normally sensation tact light touch in all dermatomal distributions and foot is warm well-perfused.     Last Recorded Vitals  Blood pressure 109/77, pulse 89, temperature 36.2 °C (97.2 °F), temperature source Temporal, resp. rate 18, height 1.803 m (5' 10.98\"), weight 109 kg (240 lb 4.8 oz), SpO2 97%.    Relevant Results  CT scan of the left knee was independently reviewed    Findings are consistent with a comminuted Schatzker 2 left tibial plateau fracture with extension along the posterior rim of the tibial plateau.  Does not appear to have any incarcerated segments and the fracture line in combination appears to be isolated to the posterior " margin of the tibial plateau.  Ghost tracks from prior ACL fixation and tunnel placement is present.    Scheduled medications  acetaminophen, 975 mg, oral, q8h  ALPRAZolam, 0.5 mg, oral, Daily  cinacalcet, 30 mg, oral, Daily with breakfast  dorzolamide-timoloL, 1 drop, Both Eyes, BID  folic acid, 1 mg, oral, Daily  heparin (porcine), 5,000 Units, subcutaneous, q8h  latanoprost, 1 drop, Both Eyes, Nightly  levothyroxine, 150 mcg, oral, Daily  multivitamin with minerals, 1 tablet, oral, Daily  pantoprazole, 40 mg, oral, Daily before breakfast  polyethylene glycol, 17 g, oral, BID  QUEtiapine, 100 mg, oral, Nightly  sennosides-docusate sodium, 2 tablet, oral, BID  thiamine, 100 mg, oral, Daily      Continuous medications     PRN medications  PRN medications: albuterol, melatonin, metoprolol, morphine, ondansetron, oxyCODONE  No results found for this or any previous visit (from the past 24 hours).     Assessment/Plan     Comminuted left tibial plateau fracture    Long discussion was had with patient regards to treatment options.  Risk benefits and alternatives of both operative and nonoperative measures were discussed with patient.  Recommendation was made to proceed with operative intervention in the form of open reduction internal fixation of left tibial plateau fracture.  Risks of surgery were discussed extents with patient including not limited to damage to nerves, tendons, vessels, persistent pain, stiffness, infection, nonunion, malunion, need for revision surgery, persistent pain, posttraumatic arthritis, hardware irritation, cardiopulmonary complications associate the procedure and anesthesia including not limited to DVT, PE, stroke, heart attack with goals of surgery to appropriately reduce and stabilize the proximal tibia to allow for maximal chance of bony union, earlier range of motion and improved pain relief.  With knowledge of risk benefits and alternatives patient is elected to proceed.    Will be kept  n.p.o. at midnight and will be given appropriate antibiotics on-call to the OR.    Yeison Whitt MD

## 2025-03-06 NOTE — OP NOTE
ORIF, FRACTURE, TIBIA, PLATEAU (L) Operative Note     Date: 2025 - 3/6/2025  OR Location: [facility] OR    Name: Sp Morrison, : 1965, Age: 60 y.o., MRN: [id number], Sex: male    Diagnosis  Pre-op Diagnosis      * Closed fracture of left tibial plateau, initial encounter [S82.142A] Post-op Diagnosis     * Closed fracture of left tibial plateau, initial encounter [S82.142A]     Procedures  ORIF, FRACTURE, TIBIA, PLATEAU  48201 - RI OPEN TX TIBIAL FRACTURE PROXIMAL UNICONDYLAR      Surgeons      * Yeison Whitt - Primary    Resident/Fellow/Other Assistant:  Surgeons and Role:  * No surgeons found with a matching role *    Staff:   Scrub Person: Collin  Circulator: Good  Surgical Assistant: Hay    Anesthesia Staff: Anesthesiologist: Alexx Weber MD  C-AA: DIEGO Patel    Procedure Summary  Anesthesia: General  ASA: III  Estimated Blood Loss: 30 mL  Intra-op Medications:   Administrations occurring from 0 to  on 25:   Medication Name Total Dose   acetaminophen (Tylenol) tablet 975 mg Cannot be calculated   albuterol 2.5 mg /3 mL (0.083 %) nebulizer solution 2.5 mg Cannot be calculated   ALPRAZolam (Xanax) tablet 0.5 mg Cannot be calculated   ceFAZolin (Ancef) IV 2 g in 100 mL dextrose (iso) - premix 2 g   cinacalcet (Sensipar) tablet 30 mg Cannot be calculated   dorzolamide-timoloL (Cosopt) 22.3-6.8 mg/mL ophthalmic solution 1 drop Cannot be calculated   fentaNYL PF 0.05 mg/mL 100 mcg   folic acid (Folvite) tablet 1 mg Cannot be calculated   HYDROmorphone (Dilaudid) injection 1 mg/mL 1 mg   LR bolus Cannot be calculated   latanoprost (Xalatan) 0.005 % ophthalmic solution 1 drop Cannot be calculated   levothyroxine (Synthroid, Levoxyl) tablet 150 mcg Cannot be calculated   lidocaine PF (Xylocaine-MPF)  2 % 100 mg   melatonin tablet 3 mg Cannot be calculated   metoprolol tartrate (Lopressor) injection 5 mg Cannot be calculated   metoprolol tartrate (Lopressor) injection 2 mg    midazolam (Versed) 1 mg/1 mL 2 mg   morphine injection 2 mg Cannot be calculated   ondansetron (Zofran) injection 4 mg Cannot be calculated   ondansetron (Zofran) 2 mg/mL injection 4 mg   oxyCODONE (Roxicodone) immediate release tablet 5 mg Cannot be calculated   pantoprazole (ProtoNix) EC tablet 40 mg Cannot be calculated   phenylephrine 100 mcg/mL syringe 10 mL (prefilled) 200 mcg   polyethylene glycol (Glycolax, Miralax) packet 17 g Cannot be calculated   propofol (Diprivan) injection 10 mg/mL 200 mg   QUEtiapine (SEROquel) tablet 100 mg Cannot be calculated   rocuronium 10 mg/mL 50 mg   sennosides-docusate sodium (Ani-Colace) 8.6-50 mg per tablet 2 tablet Cannot be calculated   thiamine (Vitamin B-1) tablet 100 mg Cannot be calculated              Anesthesia Record               Intraprocedure I/O Totals       None           Specimen: No specimens collected              Drains and/or Catheters: * None in log *    Tourniquet Times:   * Missing tourniquet times found for documented tourniquets in lo *     Implants:  Implants       Type Name Action Serial No.      Screw SCREW, CANNULATED, 32 MM THREAD, 7.3 X 65 MM, STAINLESS STEEL - SNA - [id number] Used, Not Implanted NA     Screw SCREW, CANNULATED, 32 MM THREAD, 7.3 X 75 MM, STAINLESS STEEL - SNA - [id number] Implanted NA     Screw 73.MM CANNULATED SCREW, 32MM THREAD LENGTH. 50MM Implanted NA     Screw WASHER, F/LARGE SCREWS, 13 MM, STAINLESS STEEL - SNA - [id number] Implanted NA              Findings: Left tibial plateau fracture    Indications: Sp Morrison is an 60 y.o. male who is having surgery for left tibial plateau fracture.  Patient had substantial displacement of the articular surface and recommendation was made to proceed with operative intervention for bony stability and maximize chance of bony union of the left tibial plateau fracture.  Risk benefits and alternatives were discussed extents with the patient he was in agreement to  proceed.    The patient was seen in the preoperative area. The risks, benefits, complications, treatment options, non-operative alternatives, expected recovery and outcomes were discussed with the patient. The possibilities of reaction to medication, pulmonary aspiration, injury to surrounding structures, bleeding, recurrent infection, the need for additional procedures, failure to diagnose a condition, and creating a complication requiring transfusion or operation were discussed with the patient. The patient concurred with the proposed plan, giving informed consent.  The site of surgery was properly noted/marked if necessary per policy. The patient has been actively warmed in preoperative area. Preoperative antibiotics have been ordered and given within 1 hours of incision. Venous thrombosis prophylaxis have been ordered including bilateral sequential compression devices and chemical prophylaxis    Procedure Details: Patient was taken the operating room placed supine the operative table.  A thigh tourniquet was applied to the left lower extremity and a bump was applied on the left hip.  Bone foam was utilized for better visualization during fluoroscopic imaging and the left lower extremity was prepped and draped as is typical for extremity procedure.  A timeout was performed, all pressure identified, the correct surgical site was noted.  At the conclusion of the timeout, leg was elevated and tourniquet was inflated to 250 mmHg.  Utilizing fluoroscopic imaging the proximal tibia was identified both medially and laterally in its appropriate position.    Stab incision was made along the medial and lateral aspects of the proximal tibia.  C-clamp was subsequently applied to the proximal tibia in its midline.  The fracture was subsequently reduced and compressed uneventfully.    I then proceeded to make a stab incision slightly posterior to this on the lateral side along the tibial plateau.  Guidewire was then  subsequently inserted and advanced in appropriate position on fluoroscopic imaging.  This was appropriate for a Synthes 7.3 millimeter screw partially-threaded to provide compression at the fracture site.  This was then subsequently inserted without complication which achieved excellent compression at the fracture site.  C-clamp was then subsequently removed and an additional anterior screw was subsequently placed to provide compression of along the posterior and anterior margins of this.  Screw was placed under fluoroscopic guidance without complication.  Wounds were irrigated with normal saline and chlorhexidine solution.  Skin was closed with nylon suture.  Patient tolerated procedure well no complications occurred.  Tourniquet was deflated at 17 minutes.    An assistant was used during this case.  Assistant Irvin Cook SA helped in the positioning, prepping, draping, retracting and aiding in my ability to perform critical portions of this case and performed the majority of the closure and dressing application.  Help from the assistant was critical in performing these functions.    Complications:  None; patient tolerated the procedure well.    Disposition: PACU - hemodynamically stable.  Condition: stable       Postoperative plan: Patient will remain nonweightbearing in the left lower extremity.  Will allow for immediate range of motion of the knee from 0 to 90 degrees.  Will initiate Lovenox 40 mg subcu daily for 30 days beginning on postoperative day #1.  Follow-up in office in 2 weeks for a skin check.      Yeison Whitt  Phone Number: [phone]

## 2025-03-06 NOTE — PROGRESS NOTES
Pt is scheduled for surgery today. Secure chat sent to medical team requesting dc order be removed.

## 2025-03-06 NOTE — ANESTHESIA PROCEDURE NOTES
Peripheral IV  Date/Time: 3/6/2025 5:51 PM  Inserted by: DIEGO Patel    Placement  Needle size: 18 G  Laterality: left  Location: wrist  Site prep: alcohol  Attempts: 1

## 2025-03-06 NOTE — ANESTHESIA PREPROCEDURE EVALUATION
Patient: Sp Morrison    Procedure Information       Date/Time: 03/06/25 1630    Procedure: ORIF, FRACTURE, TIBIA, PLATEAU (Left: Leg Lower) - Synthes 6.5/7.3 cannulated screws with washers, large C-arm, regular table, bone foam, Large C clamp    Location: [facility] / [facility]    Surgeons: Yeison Whitt MD            Relevant Problems   Cardiac   (+) Hyperlipidemia      Neuro   (+) Anxiety      GI   (+) Esophageal varices without bleeding, unspecified esophageal varices type (Multi)   (+) GERD without esophagitis      Liver   (+) Hepatitis C virus infection without hepatic coma      Endocrine   (+) Acquired hypothyroidism   (+) Hyperparathyroidism (Multi)   (+) Hypothyroidism      HEENT   (+) Glaucoma      ID   (+) Hepatitis C virus infection without hepatic coma   (+) Recurrent genital herpes       Clinical information reviewed:   Tobacco  Allergies  Meds   Med Hx  Surg Hx   Fam Hx  Soc Hx        NPO Detail:  No data recorded     Physical Exam    Airway  Mallampati: III  TM distance: >3 FB  Neck ROM: full     Cardiovascular   Rhythm: regular  Rate: normal     Dental - normal exam     Pulmonary   Breath sounds clear to auscultation     Abdominal - normal exam           Vitals:    03/06/25 1600   BP: 121/81   Pulse: 72   Resp: 18   Temp: 36.6 °C (97.9 °F)   SpO2: 96%       Past Surgical History:   Procedure Laterality Date    CATARACT EXTRACTION      IRIDOTOMY / IRIDECTOMY      SHOULDER SURGERY       Past Medical History:   Diagnosis Date    Acquired hypothyroidism 10/09/2015    Anxiety     Chondromalacia of right patella     Chronic prescription benzodiazepine use     Erectile dysfunction     Esophageal varices without bleeding, unspecified esophageal varices type (Multi) 05/23/2024    GERD (gastroesophageal reflux disease)     GERD without esophagitis 12/12/2016    Glaucoma     H/O esophageal varices     History of left knee replacement     Hypercalcemia     Hyperlipidemia      Hyperparathyroidism (Multi) 05/23/2024    Hypothyroidism     Insomnia     Left anterior cruciate ligament tear 02/12/2025    Liver cirrhosis (Multi)     Recurrent genital herpes 10/25/2013    Rupture of anterior cruciate ligament of left knee, initial encounter     Tear of meniscus of left knee     Vitamin D deficiency 01/07/2016       Current Facility-Administered Medications:     acetaminophen (Tylenol) tablet 975 mg, 975 mg, oral, q8h, HUMPHREY Martin, 975 mg at 03/06/25 1308    albuterol 2.5 mg /3 mL (0.083 %) nebulizer solution 2.5 mg, 2.5 mg, nebulization, q6h PRN, HUMPHREY Jara    ALPRAZolam (Xanax) tablet 0.5 mg, 0.5 mg, oral, Daily, HUMPHREY Martin, 0.5 mg at 03/05/25 2013    cinacalcet (Sensipar) tablet 30 mg, 30 mg, oral, Daily with breakfast, Kelvin Cuellar MD, 30 mg at 03/06/25 0811    dorzolamide-timoloL (Cosopt) 22.3-6.8 mg/mL ophthalmic solution 1 drop, 1 drop, Both Eyes, BID, HUMPHREY Jara, 1 drop at 03/06/25 0811    folic acid (Folvite) tablet 1 mg, 1 mg, oral, Daily, HUMPHREY Jara, 1 mg at 03/06/25 0811    [Held by provider] heparin (porcine) injection 5,000 Units, 5,000 Units, subcutaneous, q8h, HUMPHREY Martin, 5,000 Units at 03/05/25 2118    latanoprost (Xalatan) 0.005 % ophthalmic solution 1 drop, 1 drop, Both Eyes, Nightly, HUMPHREY aJra, 1 drop at 03/05/25 2013    levothyroxine (Synthroid, Levoxyl) tablet 150 mcg, 150 mcg, oral, Daily, HUMPHREY Jara, 150 mcg at 03/06/25 0811    melatonin tablet 3 mg, 3 mg, oral, Nightly PRN, HUMPHREY Kovacs, 3 mg at 03/01/25 0147    metoprolol tartrate (Lopressor) injection 5 mg, 5 mg, intravenous, q6h PRN, HUMPHREY Jara, 5 mg at 03/01/25 1402    morphine injection 2 mg, 2 mg, intravenous, q4h PRN, BERNARDINO Martin-CNP    multivitamin with minerals 1 tablet, 1 tablet, oral, Daily, HUMPHREY Jara, 1 tablet at  03/06/25 0811    ondansetron (Zofran) injection 4 mg, 4 mg, intravenous, q6h PRN, Justin Francis MD, 4 mg at 02/28/25 1815    oxyCODONE (Roxicodone) immediate release tablet 5 mg, 5 mg, oral, q4h PRN, HUMPHREY Martin, 5 mg at 03/06/25 0608    pantoprazole (ProtoNix) EC tablet 40 mg, 40 mg, oral, Daily before breakfast, HUMPHREY Jara, 40 mg at 03/06/25 0608    polyethylene glycol (Glycolax, Miralax) packet 17 g, 17 g, oral, BID, HUMPHREY Martin, 17 g at 03/06/25 0811    QUEtiapine (SEROquel) tablet 100 mg, 100 mg, oral, Nightly, HUMPHREY Jara, 100 mg at 03/05/25 2013    sennosides-docusate sodium (Ani-Colace) 8.6-50 mg per tablet 2 tablet, 2 tablet, oral, BID, HUMPHREY Martin, 2 tablet at 03/06/25 0811    thiamine (Vitamin B-1) tablet 100 mg, 100 mg, oral, Daily, HUMPHREY Jara, 100 mg at 03/06/25 0811  Prior to Admission medications    Medication Sig Start Date End Date Taking? Authorizing Provider   acyclovir (Zovirax) 400 mg tablet Take 1 tablet twice daily as needed for suppression. 11/17/23  Yes HUMPHREY Vicente   azithromycin (Zithromax) 250 mg tablet Take 2 tabs (500 mg) by mouth today, then take 1 tab daily for 4 days. 2/25/25  Yes Km Piedra DO   predniSONE (Deltasone) 20 mg tablet Take 1 tablet (20 mg) by mouth once daily for 5 days. 2/25/25 3/2/25 Yes Km Piedra DO   acetaminophen (Tylenol) 325 mg tablet Take 3 tablets (975 mg) by mouth every 8 hours. 3/4/25   HUMPHREY Martin   albuterol 90 mcg/actuation inhaler Inhale 2 puffs every 6 hours if needed for wheezing (Cough) for up to 25 days. 2/25/25 3/22/25  Km Piedra DO   ALPRAZolam (Xanax) 0.5 mg tablet Take 1 tablet (0.5 mg) by mouth once daily. 12/17/24   Geena Hunter MD   cinacalcet (Sensipar) 30 mg tablet Take 1 tablet (30 mg) by mouth once daily with breakfast. Take with food or shortly afer a meal. Swallow tablet whole; do  not break or divide. 3/5/25   HUMPHREY Martin   dorzolamide-timoloL (Cosopt) 22.3-6.8 mg/mL ophthalmic solution Administer 1 drop into both eyes 2 times a day. 6/7/24 6/7/25  Zach Tinajero MD   folic acid (Folvite) 1 mg tablet Take 1 tablet (1 mg) by mouth once daily. 3/5/25   HUMPHREY Martin   latanoprost (Xalatan) 0.005 % ophthalmic solution Administer 1 drop into both eyes once daily at bedtime. INSTILL ONE DROP IN EACH EYE EVERY EVENING. 6/7/24   Zach Tinajero MD   levothyroxine (Synthroid, Levoxyl) 150 mcg tablet Take 1 tablet (150 mcg) by mouth once daily. 2/12/25   Rafael PINEDA MD   multivitamin with minerals tablet Take 1 tablet by mouth once daily. 3/5/25   HUMPHREY Martin   omeprazole (PriLOSEC) 20 mg DR capsule Take 1 capsule (20 mg) by mouth once daily. 5/23/24 5/23/25  Rafael PINEDA MD   oxyCODONE (Roxicodone) 5 mg immediate release tablet Take 1 tablet (5 mg) by mouth every 4 hours if needed for moderate pain (4 - 6) or severe pain (7 - 10). 3/4/25   HUMPHREY Martin   polyethylene glycol (Glycolax, Miralax) 17 gram packet Take 17 g by mouth once daily. 3/4/25   HUMPHREY Martin   polyethylene glycol (Glycolax, Miralax) 17 gram packet Take 17 g by mouth 2 times a day. 3/5/25   HUMPHREY Martin   QUEtiapine (SEROquel) 25 mg tablet TAKE 4 to 5  TABLETS BY MOUTH AT BEDTIME AS NEEDED. 12/17/24   Geena Hunter MD   sennosides-docusate sodium (Ani-Colace) 8.6-50 mg tablet Take 2 tablets by mouth 2 times a day. 3/5/25   HUMPHREY Martin   tadalafil 20 mg tablet Take by mouth once daily as needed for erectile dysfunction. 12/17/19   Historical Provider, MD   thiamine (Vitamin B-1) 100 mg tablet Take 1 tablet (100 mg) by mouth once daily. 3/5/25   Selin Ward, APRN-CNP   colchicine 0.6 mg tablet Take 2 pills (1.2 mg x1) initially and then take 1 pill (0.6 mg) one, hour later.  Patient  not taking: Reported on 2/28/2025 9/26/24 2/28/25  Meng Morel PA-C   fluticasone (Flonase) 50 mcg/actuation nasal spray Administer into affected nostril(s) every 12 hours.  Patient not taking: Reported on 2/28/2025 3/27/19 2/28/25  Historical Provider, MD   simvastatin (Zocor) 40 mg tablet Take by mouth once daily.  Patient not taking: Reported on 2/28/2025 8/16/19 2/28/25  Historical Provider, MD     No Known Allergies  Social History     Tobacco Use    Smoking status: Some Days     Current packs/day: 1.00     Average packs/day: 1 pack/day for 10.0 years (10.0 ttl pk-yrs)     Types: Cigarettes     Passive exposure: Past    Smokeless tobacco: Never   Substance Use Topics    Alcohol use: Yes     Alcohol/week: 12.0 standard drinks of alcohol     Types: 12 Cans of beer per week         Chemistry    Lab Results   Component Value Date/Time     03/06/2025 1101     02/12/2025 1416    K 4.2 03/06/2025 1101    K 4.3 02/12/2025 1416     03/06/2025 1101     02/12/2025 1416    CO2 25 03/06/2025 1101    CO2 26 02/12/2025 1416    BUN 13 03/06/2025 1101    BUN 13 02/12/2025 1416    CREATININE 0.89 03/06/2025 1101    CREATININE 0.84 02/12/2025 1416    Lab Results   Component Value Date/Time    CALCIUM 11.4 (H) 03/06/2025 1101    CALCIUM 11.3 (H) 02/12/2025 1416    ALKPHOS 49 02/28/2025 0427    ALKPHOS 63 02/12/2025 1416    AST 30 02/28/2025 0427    AST 17 02/12/2025 1416    ALT 33 02/28/2025 0427    ALT 16 02/12/2025 1416    BILITOT 0.4 02/28/2025 0427    BILITOT 0.3 02/12/2025 1416          Lab Results   Component Value Date/Time    WBC 8.6 03/06/2025 1101    WBC 6.2 02/12/2025 1416    HGB 14.4 03/06/2025 1101    HGB 14.7 02/12/2025 1416    HCT 43.3 03/06/2025 1101    HCT 45.2 02/12/2025 1416     03/06/2025 1101     02/12/2025 1416     Lab Results   Component Value Date/Time    PROTIME 14.0 (H) 02/28/2025 0427    INR 1.3 (H) 02/28/2025 0427     Encounter Date: 02/28/25    Electrocardiogram, 12-lead   Result Value    Ventricular Rate 111    Atrial Rate 111    WA Interval 152    QRS Duration 94    QT Interval 318    QTC Calculation(Bazett) 432    P Axis 74    R Axis 63    T Axis 64    QRS Count 18    Q Onset 214    P Onset 138    P Offset 198    T Offset 373    QTC Fredericia 390    Narrative    Sinus tachycardia  Nonspecific T wave abnormality  Abnormal ECG  When compared with ECG of 18-SEP-2014 22:03,  Vent. rate has increased BY  42 BPM  Nonspecific T wave abnormality now evident in Inferior leads  Nonspecific T wave abnormality now evident in Lateral leads        Anesthesia Plan    History of general anesthesia?: yes  History of complications of general anesthesia?: no    ASA 3     general     The patient is not a current smoker.    intravenous induction   Anesthetic plan and risks discussed with patient.    Plan discussed with CAA.

## 2025-03-06 NOTE — PROGRESS NOTES
Occupational Therapy    Occupational Therapy    OT Treatment    Patient Name: Sp Morrison  MRN: [id number]  Today's Date: 3/6/2025          4106/4106-A    Assessment:  End of Session Communication: Bedside nurse  End of Session Patient Position: Up in chair, Alarm on       Plan:  OT Frequency: 5 times per week  OT Discharge Recommendations: High intensity level of continued care     Subjective        03/06/25 1035   OT Last Visit   OT Received On 03/06/25   General   Reason for Referral Fall L Lateral Tibial Plateau Fx   Referred By Kelvin Cuellar MD   Past Medical History Relevant to Rehab Admitted 2/28/2025 after falling down 8 steps.  Xray left knee (+) lateral tibial plateau fracture.  Ortho consult completed and (-) surgical intervention.  Patient to wear immobilizer and maintain NWB.   Prior to Session Communication Bedside nurse   Patient Position Received Bed, 3 rail up;Alarm on   General Comment Pt agreeable to tx, cleared for participation.   Precautions   LE Weight Bearing Status Left Non-Weight Bearing   Medical Precautions Fall precautions   Braces Applied Immobilizer donned L   Pain Assessment   Pain Assessment 0-10   0-10 (Numeric) Pain Score 10 - Worst possible pain   Pain Type Acute pain   Pain Location Knee   Pain Orientation Right   Pain Interventions Rest;Repositioned   Cognition   Orientation Level Oriented X4   UE Dressing   UE Dressing Level of Assistance Minimum assistance   UE Dressing Where Assessed Edge of bed   UE Dressing Comments Pt donned gown to use as robe while seated. Assist to thread R arm through to avoid IV.   Toileting   Toileting Level of Assistance Minimum assistance;Minimal verbal cues   Where Assessed Bedside commode   Toileting Comments Assist needed to pull up garments, cues to maintain precautions.   Bed Mobility 1   Bed Mobility 1 Supine to sitting   Level of Assistance 1 Moderate assistance;+2   Functional Mobility   Functional Mobility Performed   (Pt ambulated  in room short distance with gait belt, fww and Mod A x1 with second person for chair follow.)   Transfer 1   Technique 1 Sit to stand;Stand to sit   Transfer Device 1 Walker;Gait belt   Transfer Level of Assistance 1 Moderate assistance;+2;Minimal verbal cues   Trials/Comments 1 STS completed at EOB, BSC and recliner level, cues for proper hand placement.   Transfers 2   Technique 2 Stand pivot;To right;To left   Transfer Device 2 Walker;Gait belt   Transfer Level of Assistance 2 Minimum assistance;+2;Minimal verbal cues   Trials/Comments 2 Completed stand pivot from recliner to BSC, BSC to recliner, cues for proper hand placement.   IP OT Assessment   End of Session Communication Bedside nurse   End of Session Patient Position Up in chair;Alarm on   Inpatient Plan   OT Frequency 5 times per week   OT Discharge Recommendations High intensity level of continued care     Outcome Measures:[facility] Daily Activity  Putting on and taking off regular lower body clothing: Total  Bathing (including washing, rinsing, drying): A lot  Putting on and taking off regular upper body clothing: A little  Toileting, which includes using toilet, bedpan or urinal: A lot  Taking care of personal grooming such as brushing teeth: A little  Eating Meals: None  Daily Activity - Total Score: 15  Education Documentation  No documentation found.  Education Comments  No comments found.            Goals:  Encounter Problems       Encounter Problems (Active)       Dressings Lower Extremities       STG - Patient will complete lower body dressing with mod assist with comp strategies and AE to maintain left LE WB status (Progressing)       Start:  03/01/25    Expected End:  03/15/25               Functional Balance       STG-Patient will be min assist with assistive device dynamic stand task >3 minutes for ADL completion maintaining left LE WB status (Progressing)       Start:  03/01/25    Expected End:  03/15/25               Functional Mobility        STG-Patient will be min assist with assistive device functional mobility tasks  maintaining left LE WB status (Progressing)       Start:  03/01/25    Expected End:  03/15/25               OT Transfers       STG-Patient will be min assist with functional transfers demonstrating good safety  (maintaining left LE WB status) (Progressing)       Start:  03/01/25    Expected End:  03/15/25               Safety       STG-Patient will independently verbalize left LE WB precautions with all functional tasks   (Progressing)       Start:  03/01/25    Expected End:  03/15/25                   REEMA WHITESIDE was present for supervision of this student during the treatment and documentation of this patient. ERMELINDA Patterson/JOSEFINA

## 2025-03-06 NOTE — PROGRESS NOTES
Sp Morrison is a 60 y.o. male on day 5 of admission presenting with Tibial plateau fracture, left.      Subjective   Patient is feeling well, no N/V, no CP or SOB.      Objective          Vitals 24HR  Heart Rate:  [89-94]   Temp:  [36.1 °C (97 °F)-36.6 °C (97.9 °F)]   Resp:  [17-18]   BP: (104-120)/(77-80)   SpO2:  [93 %-97 %]     Intake/Output last 3 Shifts:    Intake/Output Summary (Last 24 hours) at 3/6/2025 1347  Last data filed at 3/6/2025 0600  Gross per 24 hour   Intake --   Output 500 ml   Net -500 ml       Physical Exam  GENERAL: No distress.   SKIN: No rashes or lesions.  EYES: PERRLA, EOMI  HEENT: Head: Normocephalic  Neck: supple and no adenopathy  LUNGS: Lungs clear to auscultation. Good diaphragmatic excursion.  CARDIAC: Normal S1 and S2; no rubs, murmurs, or gallops  ABDOMEN: Abdomen soft, non-tender. BS normal.   EXTREMITIES: No ulcers, Extremities normal.   NEURO: No focal deficit.     Relevant Results             Scheduled medications  acetaminophen, 975 mg, oral, q8h  ALPRAZolam, 0.5 mg, oral, Daily  cinacalcet, 30 mg, oral, Daily with breakfast  dorzolamide-timoloL, 1 drop, Both Eyes, BID  folic acid, 1 mg, oral, Daily  heparin (porcine), 5,000 Units, subcutaneous, q8h  latanoprost, 1 drop, Both Eyes, Nightly  levothyroxine, 150 mcg, oral, Daily  multivitamin with minerals, 1 tablet, oral, Daily  pantoprazole, 40 mg, oral, Daily before breakfast  polyethylene glycol, 17 g, oral, BID  QUEtiapine, 100 mg, oral, Nightly  sennosides-docusate sodium, 2 tablet, oral, BID  thiamine, 100 mg, oral, Daily      Continuous medications         PRN medications  PRN medications: albuterol, melatonin, metoprolol, morphine, ondansetron, oxyCODONE  Results for orders placed or performed during the hospital encounter of 02/28/25 (from the past 24 hours)   CBC   Result Value Ref Range    WBC 8.6 4.4 - 11.3 x10*3/uL    nRBC 0.0 0.0 - 0.0 /100 WBCs    RBC 4.61 4.50 - 5.90 x10*6/uL    Hemoglobin 14.4 13.5 -  17.5 g/dL    Hematocrit 43.3 41.0 - 52.0 %    MCV 94 80 - 100 fL    MCH 31.2 26.0 - 34.0 pg    MCHC 33.3 32.0 - 36.0 g/dL    RDW 12.9 11.5 - 14.5 %    Platelets 412 150 - 450 x10*3/uL   Basic Metabolic Panel   Result Value Ref Range    Glucose 104 (H) 74 - 99 mg/dL    Sodium 136 136 - 145 mmol/L    Potassium 4.2 3.5 - 5.3 mmol/L    Chloride 102 98 - 107 mmol/L    Bicarbonate 25 21 - 32 mmol/L    Anion Gap 13 10 - 20 mmol/L    Urea Nitrogen 13 6 - 23 mg/dL    Creatinine 0.89 0.50 - 1.30 mg/dL    eGFR >90 >60 mL/min/1.73m*2    Calcium 11.4 (H) 8.6 - 10.3 mg/dL   Type and screen   Result Value Ref Range    ABO TYPE A     Rh TYPE NEG     ANTIBODY SCREEN NEG            Assessment/Plan              Assessment & Plan  Fall, initial encounter    Hypercalcemia    Fall    Elevated lactic acid level    Tibial plateau fracture, left    Lipohemarthrosis      60 y.o. male   PMH; esophageal varices, hypothyroidism, HLP, liver cirrhosis.   Patient is presenting with left knee pain.  He reportedly fell on steps last night when he was intoxicated.  He denies loss of consciousness but reportedly did hit his head.  He was brought by EMS to [facility] where x-rays were obtained demonstrating a lateral tibial plateau fracture.  Orthopedics was consulted.  The patient was subsequently admitted to the hospital.  He has undergone previous surgery on this knee with an ACL reconstruction and some form of meniscal surgery back in 2014.      // Left lateral tibial plateau fracture  Ortho is on consult.   PT/OT     Pre-cert received for ONNR. Pt is now scheduled for surgery today.        // Hypercalcemia  // Hyperparathyroidism.   - Ionized Ca; 1.44  - PTH; 94  - 25D; 28  - 1,25D; 87   - Phos; 2.3  - Check K/L 0.98   - Check SPEP with IF, neg.     TSH and cortisol, not significant. Has hypothyro with elevated TPO.   Will start cinacalcet 30 every day   Will avoid bisphosphonate cooper operatively.         Kelvin Cuellar MD

## 2025-03-06 NOTE — ANESTHESIA PROCEDURE NOTES
Airway  Date/Time: 3/6/2025 5:47 PM  Urgency: elective    Airway not difficult    Staffing  Performed: DIEGO   Authorized by: Alexx Weber MD    Performed by: DIEGO Patel  Patient location during procedure: OR    Indications and Patient Condition  Indications for airway management: anesthesia  Spontaneous Ventilation: absent  Sedation level: deep  Preoxygenated: yes  Patient position: sniffing  Mask difficulty assessment: 2 - vent by mask + OA or adjuvant +/- NMBA  Planned trial extubation    Final Airway Details  Final airway type: endotracheal airway      Successful airway: ETT  Cuffed: yes   Successful intubation technique: video laryngoscopy  Facilitating devices/methods: intubating stylet  Blade: Prabhu  Blade size: #4  ETT size (mm): 7.5  Cormack-Lehane Classification: grade I - full view of glottis  Placement verified by: chest auscultation and capnometry   Measured from: lips  ETT to lips (cm): 23  Number of attempts at approach: 1

## 2025-03-07 LAB
ANION GAP SERPL CALC-SCNC: 14 MMOL/L (ref 10–20)
ATRIAL RATE: 111 BPM
BUN SERPL-MCNC: 14 MG/DL (ref 6–23)
CALCIUM SERPL-MCNC: 10.5 MG/DL (ref 8.6–10.3)
CHLORIDE SERPL-SCNC: 102 MMOL/L (ref 98–107)
CO2 SERPL-SCNC: 25 MMOL/L (ref 21–32)
CREAT SERPL-MCNC: 0.82 MG/DL (ref 0.5–1.3)
EGFRCR SERPLBLD CKD-EPI 2021: >90 ML/MIN/1.73M*2
ERYTHROCYTE [DISTWIDTH] IN BLOOD BY AUTOMATED COUNT: 12.6 % (ref 11.5–14.5)
GLUCOSE SERPL-MCNC: 80 MG/DL (ref 74–99)
HCT VFR BLD AUTO: 38.6 % (ref 41–52)
HGB BLD-MCNC: 12.7 G/DL (ref 13.5–17.5)
MCH RBC QN AUTO: 31.4 PG (ref 26–34)
MCHC RBC AUTO-ENTMCNC: 32.9 G/DL (ref 32–36)
MCV RBC AUTO: 96 FL (ref 80–100)
NRBC BLD-RTO: 0 /100 WBCS (ref 0–0)
P AXIS: 74 DEGREES
P OFFSET: 198 MS
P ONSET: 138 MS
PLATELET # BLD AUTO: 338 X10*3/UL (ref 150–450)
POTASSIUM SERPL-SCNC: 4.1 MMOL/L (ref 3.5–5.3)
PR INTERVAL: 152 MS
Q ONSET: 214 MS
QRS COUNT: 18 BEATS
QRS DURATION: 94 MS
QT INTERVAL: 318 MS
QTC CALCULATION(BAZETT): 432 MS
QTC FREDERICIA: 390 MS
R AXIS: 63 DEGREES
RBC # BLD AUTO: 4.04 X10*6/UL (ref 4.5–5.9)
SODIUM SERPL-SCNC: 137 MMOL/L (ref 136–145)
T AXIS: 64 DEGREES
T OFFSET: 373 MS
VENTRICULAR RATE: 111 BPM
WBC # BLD AUTO: 7.2 X10*3/UL (ref 4.4–11.3)

## 2025-03-07 PROCEDURE — 97116 GAIT TRAINING THERAPY: CPT | Mod: GP | Performed by: PHYSICAL THERAPIST

## 2025-03-07 PROCEDURE — 80048 BASIC METABOLIC PNL TOTAL CA: CPT | Performed by: ORTHOPAEDIC SURGERY

## 2025-03-07 PROCEDURE — 2500000004 HC RX 250 GENERAL PHARMACY W/ HCPCS (ALT 636 FOR OP/ED): Performed by: ORTHOPAEDIC SURGERY

## 2025-03-07 PROCEDURE — 2500000002 HC RX 250 W HCPCS SELF ADMINISTERED DRUGS (ALT 637 FOR MEDICARE OP, ALT 636 FOR OP/ED): Performed by: ORTHOPAEDIC SURGERY

## 2025-03-07 PROCEDURE — 97164 PT RE-EVAL EST PLAN CARE: CPT | Mod: GP | Performed by: PHYSICAL THERAPIST

## 2025-03-07 PROCEDURE — 2500000001 HC RX 250 WO HCPCS SELF ADMINISTERED DRUGS (ALT 637 FOR MEDICARE OP): Performed by: ORTHOPAEDIC SURGERY

## 2025-03-07 PROCEDURE — 36415 COLL VENOUS BLD VENIPUNCTURE: CPT | Performed by: ORTHOPAEDIC SURGERY

## 2025-03-07 PROCEDURE — 97168 OT RE-EVAL EST PLAN CARE: CPT | Mod: GO | Performed by: OCCUPATIONAL THERAPIST

## 2025-03-07 PROCEDURE — 2500000001 HC RX 250 WO HCPCS SELF ADMINISTERED DRUGS (ALT 637 FOR MEDICARE OP): Performed by: NURSE PRACTITIONER

## 2025-03-07 PROCEDURE — 1100000001 HC PRIVATE ROOM DAILY

## 2025-03-07 PROCEDURE — 85027 COMPLETE CBC AUTOMATED: CPT | Performed by: ORTHOPAEDIC SURGERY

## 2025-03-07 PROCEDURE — 2500000001 HC RX 250 WO HCPCS SELF ADMINISTERED DRUGS (ALT 637 FOR MEDICARE OP)

## 2025-03-07 RX ORDER — SENNOSIDES 8.6 MG/1
2 TABLET ORAL 2 TIMES DAILY
Qty: 120 TABLET | Refills: 0 | Status: SHIPPED | OUTPATIENT
Start: 2025-03-07 | End: 2025-04-06

## 2025-03-07 RX ORDER — ENOXAPARIN SODIUM 100 MG/ML
40 INJECTION SUBCUTANEOUS DAILY
Qty: 30 EACH | Refills: 0 | Status: SHIPPED | OUTPATIENT
Start: 2025-03-07 | End: 2025-04-06

## 2025-03-07 RX ORDER — CEFAZOLIN SODIUM 2 G/100ML
2 INJECTION, SOLUTION INTRAVENOUS EVERY 8 HOURS
Status: COMPLETED | OUTPATIENT
Start: 2025-03-07 | End: 2025-03-07

## 2025-03-07 RX ADMIN — ACETAMINOPHEN 325MG 975 MG: 325 TABLET ORAL at 15:23

## 2025-03-07 RX ADMIN — STANDARDIZED SENNA CONCENTRATE 17.2 MG: 8.6 TABLET ORAL at 08:58

## 2025-03-07 RX ADMIN — THIAMINE HCL TAB 100 MG 100 MG: 100 TAB at 08:59

## 2025-03-07 RX ADMIN — ACETAMINOPHEN 325MG 975 MG: 325 TABLET ORAL at 05:18

## 2025-03-07 RX ADMIN — CEFAZOLIN SODIUM 2 G: 2 INJECTION, SOLUTION INTRAVENOUS at 09:00

## 2025-03-07 RX ADMIN — ENOXAPARIN SODIUM 30 MG: 30 INJECTION SUBCUTANEOUS at 09:00

## 2025-03-07 RX ADMIN — MORPHINE SULFATE 2 MG: 2 INJECTION, SOLUTION INTRAMUSCULAR; INTRAVENOUS at 00:07

## 2025-03-07 RX ADMIN — PANTOPRAZOLE SODIUM 40 MG: 40 TABLET, DELAYED RELEASE ORAL at 05:19

## 2025-03-07 RX ADMIN — FOLIC ACID 1 MG: 1 TABLET ORAL at 08:59

## 2025-03-07 RX ADMIN — Medication 1 TABLET: at 08:58

## 2025-03-07 RX ADMIN — SENNOSIDES AND DOCUSATE SODIUM 2 TABLET: 50; 8.6 TABLET ORAL at 08:59

## 2025-03-07 RX ADMIN — CINACALCET HYDROCHLORIDE 30 MG: 30 TABLET, FILM COATED ORAL at 09:03

## 2025-03-07 RX ADMIN — ENOXAPARIN SODIUM 30 MG: 30 INJECTION SUBCUTANEOUS at 20:05

## 2025-03-07 RX ADMIN — CEFAZOLIN SODIUM 2 G: 2 INJECTION, SOLUTION INTRAVENOUS at 15:14

## 2025-03-07 RX ADMIN — OXYCODONE HYDROCHLORIDE 10 MG: 10 TABLET ORAL at 20:04

## 2025-03-07 RX ADMIN — QUETIAPINE FUMARATE 100 MG: 100 TABLET ORAL at 20:04

## 2025-03-07 RX ADMIN — OXYCODONE HYDROCHLORIDE 10 MG: 10 TABLET ORAL at 15:14

## 2025-03-07 RX ADMIN — OXYCODONE HYDROCHLORIDE 5 MG: 5 TABLET ORAL at 09:02

## 2025-03-07 RX ADMIN — ALPRAZOLAM 0.5 MG: 0.5 TABLET ORAL at 20:05

## 2025-03-07 RX ADMIN — SENNOSIDES AND DOCUSATE SODIUM 2 TABLET: 50; 8.6 TABLET ORAL at 20:04

## 2025-03-07 RX ADMIN — LEVOTHYROXINE SODIUM 150 MCG: 150 TABLET ORAL at 09:00

## 2025-03-07 RX ADMIN — DORZOLAMIDE HYDROCHLORIDE AND TIMOLOL MALEATE 1 DROP: 20; 5 SOLUTION/ DROPS OPHTHALMIC at 09:01

## 2025-03-07 RX ADMIN — OXYCODONE HYDROCHLORIDE 10 MG: 10 TABLET ORAL at 05:18

## 2025-03-07 RX ADMIN — POLYETHYLENE GLYCOL 3350 17 G: 17 POWDER, FOR SOLUTION ORAL at 08:59

## 2025-03-07 RX ADMIN — LATANOPROST 1 DROP: 50 SOLUTION OPHTHALMIC at 20:08

## 2025-03-07 ASSESSMENT — COGNITIVE AND FUNCTIONAL STATUS - GENERAL
MOVING TO AND FROM BED TO CHAIR: A LITTLE
DAILY ACTIVITIY SCORE: 17
TOILETING: A LOT
PERSONAL GROOMING: A LITTLE
STANDING UP FROM CHAIR USING ARMS: A LITTLE
TURNING FROM BACK TO SIDE WHILE IN FLAT BAD: A LITTLE
DRESSING REGULAR LOWER BODY CLOTHING: A LOT
CLIMB 3 TO 5 STEPS WITH RAILING: A LOT
MOVING FROM LYING ON BACK TO SITTING ON SIDE OF FLAT BED WITH BEDRAILS: A LITTLE
HELP NEEDED FOR BATHING: A LOT
WALKING IN HOSPITAL ROOM: A LOT
MOBILITY SCORE: 16

## 2025-03-07 ASSESSMENT — PAIN SCALES - GENERAL
PAINLEVEL_OUTOF10: 4
PAINLEVEL_OUTOF10: 6
PAINLEVEL_OUTOF10: 8
PAINLEVEL_OUTOF10: 4
PAINLEVEL_OUTOF10: 6
PAINLEVEL_OUTOF10: 10 - WORST POSSIBLE PAIN

## 2025-03-07 ASSESSMENT — PAIN - FUNCTIONAL ASSESSMENT
PAIN_FUNCTIONAL_ASSESSMENT: 0-10

## 2025-03-07 ASSESSMENT — PAIN SCALES - PAIN ASSESSMENT IN ADVANCED DEMENTIA (PAINAD)
BODYLANGUAGE: RELAXED
FACIALEXPRESSION: SAD, FRIGHTENED, FROWN
BREATHING: NORMAL
TOTALSCORE: MEDICATION (SEE MAR)
FACIALEXPRESSION: SMILING OR INEXPRESSIVE
CONSOLABILITY: NO NEED TO CONSOLE
TOTALSCORE: 0
NEGVOCALIZATION: OCCASIONAL MOAN/GROAN, LOW SPEECH, NEGATIVE/DISAPPROVING QUALITY

## 2025-03-07 ASSESSMENT — PAIN DESCRIPTION - ORIENTATION: ORIENTATION: LEFT

## 2025-03-07 ASSESSMENT — PAIN DESCRIPTION - DESCRIPTORS: DESCRIPTORS: ACHING;SORE

## 2025-03-07 ASSESSMENT — PAIN DESCRIPTION - LOCATION: LOCATION: LEG

## 2025-03-07 NOTE — PROGRESS NOTES
"Sp Morrison is a 60 y.o. male on day 6 of admission presenting with Tibial plateau fracture, left.    Subjective   Mr. Morrison is lying in bed upon entering room.  Currently verbalizing no complaints reports he just recently had pain medication.  Tolerating a diet with no nausea vomiting.  Voiding well.  Plan is for discharge to acute rehab.       Objective     Physical Exam  Constitutional:       Appearance: Normal appearance.   HENT:      Head: Normocephalic and atraumatic.      Nose: Nose normal.      Mouth/Throat:      Mouth: Mucous membranes are moist.   Cardiovascular:      Rate and Rhythm: Normal rate.   Pulmonary:      Effort: Pulmonary effort is normal.   Abdominal:      General: Abdomen is flat.      Palpations: Abdomen is soft.   Musculoskeletal:      Cervical back: Neck supple.      Comments: Left lower extremity with knee immobilizer in place, beneath knee immobilizer bulky surgical dressing which was parted and surgical dressing over knee area is clean dry and intact.  Sensation is present throughout left lower extremity, plantar and dorsiflexion against resistance in left foot, DP pulse palpable left foot   Skin:     General: Skin is warm and dry.   Neurological:      General: No focal deficit present.      Mental Status: He is alert.   Psychiatric:         Mood and Affect: Mood normal.         Last Recorded Vitals  Blood pressure 120/80, pulse 86, temperature 36.6 °C (97.9 °F), temperature source Temporal, resp. rate 16, height 1.803 m (5' 10.98\"), weight 109 kg (240 lb 4.8 oz), SpO2 94%.  Intake/Output last 3 Shifts:  I/O last 3 completed shifts:  In: 700 (6.4 mL/kg) [IV Piggyback:700]  Out: 500 (4.6 mL/kg) [Urine:500 (0.1 mL/kg/hr)]  Weight: 109 kg     Relevant Results  Results for orders placed or performed during the hospital encounter of 02/28/25 (from the past 24 hours)   CBC   Result Value Ref Range    WBC 7.2 4.4 - 11.3 x10*3/uL    nRBC 0.0 0.0 - 0.0 /100 WBCs    RBC 4.04 (L) 4.50 " - 5.90 x10*6/uL    Hemoglobin 12.7 (L) 13.5 - 17.5 g/dL    Hematocrit 38.6 (L) 41.0 - 52.0 %    MCV 96 80 - 100 fL    MCH 31.4 26.0 - 34.0 pg    MCHC 32.9 32.0 - 36.0 g/dL    RDW 12.6 11.5 - 14.5 %    Platelets 338 150 - 450 x10*3/uL   Basic Metabolic Panel   Result Value Ref Range    Glucose 80 74 - 99 mg/dL    Sodium 137 136 - 145 mmol/L    Potassium 4.1 3.5 - 5.3 mmol/L    Chloride 102 98 - 107 mmol/L    Bicarbonate 25 21 - 32 mmol/L    Anion Gap 14 10 - 20 mmol/L    Urea Nitrogen 14 6 - 23 mg/dL    Creatinine 0.82 0.50 - 1.30 mg/dL    eGFR >90 >60 mL/min/1.73m*2    Calcium 10.5 (H) 8.6 - 10.3 mg/dL     Scheduled medications  acetaminophen, 975 mg, oral, q8h  ALPRAZolam, 0.5 mg, oral, Daily  cinacalcet, 30 mg, oral, Daily with breakfast  dorzolamide-timoloL, 1 drop, Both Eyes, BID  enoxaparin, 30 mg, subcutaneous, q12h  folic acid, 1 mg, oral, Daily  latanoprost, 1 drop, Both Eyes, Nightly  levothyroxine, 150 mcg, oral, Daily  multivitamin with minerals, 1 tablet, oral, Daily  pantoprazole, 40 mg, oral, Daily before breakfast  polyethylene glycol, 17 g, oral, BID  QUEtiapine, 100 mg, oral, Nightly  sennosides, 2 tablet, oral, BID  sennosides-docusate sodium, 2 tablet, oral, BID  thiamine, 100 mg, oral, Daily      Continuous medications     PRN medications  PRN medications: albuterol, melatonin, metoprolol, naloxone, ondansetron, oxyCODONE, oxyCODONE, oxyCODONE-acetaminophen    Assessment/Plan   Assessment & Plan  Tibial plateau fracture, left    Hypercalcemia    Fall    Elevated lactic acid level    Lipohemarthrosis    Fall, initial encounter    Postop day 1 of ORIF of left tibial plateau fracture  Physical and Occupational Therapy are on consult  Weightbearing as tolerated with walker  Lovenox for VTE prophylaxis which will need to be continued for 30 days  Labs reviewed  Multimodal pain regime  Home medications ordered for chronic medical conditions as appropriate  Bowel regime  Encourage incentive  spirometry  Plans on discharge to acute rehab, okay from orthopedic standpoint when medically clear, please call with any further questions or concerns  Follow-up with Dr. Whitt on Monday, March 13 at 9:30 AM as directed       I spent 15 minutes in the professional and overall care of this patient.      Geena Goss PA-C

## 2025-03-07 NOTE — PROGRESS NOTES
Physical Therapy    Physical Therapy Evaluation    Patient Name: Sp Morrison  MRN: [id number]  Today's Date: 3/7/2025   Time Calculation  Start Time: 1320  Stop Time: 1343  Time Calculation (min): 23 min  4106/4106-A    Assessment/Plan   PT Assessment  PT Assessment Results: Decreased strength, Decreased endurance, Impaired balance, Decreased mobility  Rehab Prognosis: Good  Evaluation/Treatment Tolerance: Patient tolerated treatment well  Medical Staff Made Aware: Yes  Strengths: Ability to acquire knowledge, Attitude of self  End of Session Communication: Bedside nurse  Assessment Comment: Pt is a 60 y.o. male admitted for Fall, initial encounter [W19.XXXA]  Closed fracture of left tibial plateau, initial encounter [S82.142A] on 2/28/2025. Pt below functional level and will benefit from skilled therapy during stay to improve overall functional mobility, strength, ROM, endurance and safety awareness. Upon discharge pt will benefit from high intensity therapy for continued improvement in functional mobility. Therapy will continue to follow and reassess each session.      IP OR SWING BED PT PLAN  Inpatient or Swing Bed: Inpatient  PT Plan  Treatment/Interventions: Transfer training, Gait training, Stair training, Balance training, Strengthening, Therapeutic exercise, Therapeutic activity  PT Plan: Ongoing PT  PT Frequency: Daily  PT Discharge Recommendations: High intensity level of continued care  Equipment Recommended upon Discharge: Wheeled walker, Wheelchair  PT Recommended Transfer Status: Assist x1  PT - OK to Discharge: Yes    Subjective     Current Problem:  1. Fall, initial encounter        2. Closed fracture of left tibial plateau, initial encounter  polyethylene glycol (Glycolax, Miralax) 17 gram packet    oxyCODONE (Roxicodone) 5 mg immediate release tablet    acetaminophen (Tylenol) 325 mg tablet    Case Request Operating Room: ORIF, FRACTURE, TIBIA, PLATEAU    Case Request Operating Room: ORIF,  FRACTURE, TIBIA, PLATEAU    enoxaparin (Lovenox) 40 mg/0.4 mL syringe      3. Hyperparathyroidism (Multi)  cinacalcet (Sensipar) 30 mg tablet      4. Alcohol use  folic acid (Folvite) 1 mg tablet    multivitamin with minerals tablet    thiamine (Vitamin B-1) 100 mg tablet      5. Constipation, unspecified constipation type  polyethylene glycol (Glycolax, Miralax) 17 gram packet    sennosides-docusate sodium (Ani-Colace) 8.6-50 mg tablet    sennosides (Senokot) 8.6 mg tablet        Patient Active Problem List   Diagnosis    Esophageal varices without bleeding, unspecified esophageal varices type (Multi)    Hyperparathyroidism (Multi)    Hypothyroidism    Anxiety    Constipation    Erectile dysfunction    GERD without esophagitis    Glaucoma    Hepatitis C virus infection without hepatic coma    History of left knee replacement    Hypercalcemia    History of thyroid disorder    Hyperlipidemia    Insomnia    Left anterior cruciate ligament tear    Recurrent genital herpes    Vitamin D deficiency    Acquired hypothyroidism    Hyperplastic polyp of large intestine    Fall    Elevated lactic acid level    Tibial plateau fracture, left    Lipohemarthrosis    Fall, initial encounter       General Visit Information:  General  Reason for Referral: Fall L Lateral Tibial Plateau Fx s/p Closed fracture of left tibial plateau, initial encounter  Referred By: Dr. Cuellar  Co-Treatment: OT  Co-Treatment Reason: for safety  Prior to Session Communication: Bedside nurse  Patient Position Received: Bed, 3 rail up  Preferred Learning Style: verbal, visual  General Comment: Pt agreeable to therapy    Home Living:  Home Living  Type of Home: House  Lives With: Siblings  Home Adaptive Equipment:  (Sisters have a BSC.)  Home Layout: One level  Home Access: Stairs to enter without rails  Entrance Stairs-Number of Steps: 1  Bathroom Shower/Tub: Walk-in shower  Home Living Comments: Plans to stay with his 2 sisters.  His 2 story hse. has 10  stairs up to bedroom with a railing.  Lives alone.    Prior Level of Function:  Prior Function Per Pt/Caregiver Report  Level of [place]: Independent with homemaking with ambulation  Ambulatory Assistance: Independent  Vocational: Retired    Precautions:  Precautions  LE Weight Bearing Status: Left Non-Weight Bearing  Medical Precautions: Fall precautions  Braces Applied: Immobilizer donned L    Vital Signs:     Objective     Pain:  Pain Assessment  Pain Assessment: 0-10  0-10 (Numeric) Pain Score: 6  Pain Type: Surgical pain  Pain Location: Leg  Pain Orientation: Left  Response to Interventions: Decrease in pain    Cognition:  Cognition  Overall Cognitive Status: Within Functional Limits  Orientation Level: Oriented X4    General Assessments:      Activity Tolerance  Endurance: Decreased tolerance for upright activites        Static Sitting Balance  Static Sitting-Comment/Number of Minutes: good  Dynamic Sitting Balance  Dynamic Sitting-Comments: good  Static Standing Balance  Static Standing-Comment/Number of Minutes: fair  Dynamic Standing Balance  Dynamic Standing-Comments: fair    Functional Assessments:     Bed Mobility  Bed Mobility: Yes  Bed Mobility 1  Bed Mobility 1: Supine to sitting  Level of Assistance 1: Contact guard (knee immobilizer in place)  Transfers  Transfer: Yes  Transfer 1  Transfer From 1: Sit to, Stand to  Technique 1: Sit to stand, Stand to sit  Transfer Device 1: Gait belt, Walker  Transfer Level of Assistance 1: Minimum assistance, +2  Ambulation/Gait Training  Ambulation/Gait Training Performed: Yes  Ambulation/Gait Training 1  Surface 1: Level tile  Device 1: Rolling walker  Gait Support Devices: Gait belt  Assistance 1: Minimum assistance  Quality of Gait 1:  (step to gait, NWB)  Comments/Distance (ft) 1: 15 ft          Extremity/Trunk Assessments:        RLE   RLE : Within Functional Limits  LLE   LLE : Within Functional Limits    Outcome Measures:     [facility] Basic  Mobility  Turning from your back to your side while in a flat bed without using bedrails: A little  Moving from lying on your back to sitting on the side of a flat bed without using bedrails: A little  Moving to and from bed to chair (including a wheelchair): A little  Standing up from a chair using your arms (e.g. wheelchair or bedside chair): A little  To walk in hospital room: A lot  Climbing 3-5 steps with railing: A lot  Basic Mobility - Total Score: 16       Goals:  Encounter Problems       Encounter Problems (Active)       Mobility       STG - Patient will ambulate X 20-40 Ft. with a w/w, CGA.   (Progressing)       Start:  03/01/25    Expected End:  03/15/25            STG - Patient will ambulate up and down a curb/step with a walker, MIN A x1.   (Progressing)       Start:  03/01/25    Expected End:  03/15/25            Goal 1:  DEMO a G=SLR on L.   (Progressing)       Start:  03/01/25    Expected End:  03/15/25               PT Transfers       STG - Patient will perform bed mobility with CGA.   (Progressing)       Start:  03/01/25    Expected End:  03/15/25            STG - Patient will transfer sit to and from stand into a w/w, CGA.   (Progressing)       Start:  03/01/25    Expected End:  03/15/25               Pain - Adult          Safety       STG - Patient uses gait belt during all transfers, curb step trng., and amb. trng..  (Progressing)       Start:  03/01/25    Expected End:  03/15/25                 Education Documentation  Handouts, taught by Dayana Mayers PT at 3/7/2025  3:13 PM.  Learner: Patient  Readiness: Acceptance  Method: Explanation  Response: Verbalizes Understanding, Needs Reinforcement    Precautions, taught by Dayana Mayers PT at 3/7/2025  3:13 PM.  Learner: Patient  Readiness: Acceptance  Method: Explanation  Response: Verbalizes Understanding, Needs Reinforcement    Body Mechanics, taught by Dayana Mayers, PT at 3/7/2025  3:13 PM.  Learner: Patient  Readiness:  Acceptance  Method: Explanation  Response: Verbalizes Understanding, Needs Reinforcement    Home Exercise Program, taught by Dayana Mayers, PT at 3/7/2025  3:13 PM.  Learner: Patient  Readiness: Acceptance  Method: Explanation  Response: Verbalizes Understanding, Needs Reinforcement    Mobility Training, taught by Dayana Mayers, PT at 3/7/2025  3:13 PM.  Learner: Patient  Readiness: Acceptance  Method: Explanation  Response: Verbalizes Understanding, Needs Reinforcement    Education Comments  No comments found.

## 2025-03-07 NOTE — PROGRESS NOTES
Sp Morrison is a 60 y.o. male on day 6 of admission presenting with Tibial plateau fracture, left.      Subjective   Patient is feeling well, no N/V, no CP or SOB.      Objective          Vitals 24HR  Heart Rate:  []   Temp:  [36.2 °C (97.2 °F)-36.7 °C (98.1 °F)]   Resp:  [13-18]   BP: (110-159)/(75-95)   SpO2:  [92 %-100 %]     Intake/Output last 3 Shifts:    Intake/Output Summary (Last 24 hours) at 3/7/2025 0896  Last data filed at 3/6/2025 1848  Gross per 24 hour   Intake 700 ml   Output --   Net 700 ml       Physical Exam  GENERAL: No distress.   SKIN: No rashes or lesions.  EYES: PERRLA, EOMI  HEENT: Head: Normocephalic  Neck: supple and no adenopathy  LUNGS: Lungs clear to auscultation. Good diaphragmatic excursion.  CARDIAC: Normal S1 and S2; no rubs, murmurs, or gallops  ABDOMEN: Abdomen soft, non-tender. BS normal.   EXTREMITIES: No ulcers, Extremities normal.   NEURO: No focal deficit.     Relevant Results  {If you would like to pull in Medications, type .meds     If you would like to pull in Lab results for the last 24 hours, type .[id number]    If you would like to pull in Imaging results, type .imgrslt :99}      {Link to Stroke Scoring tools - Link :99}     Scheduled medications  acetaminophen, 975 mg, oral, q8h  ALPRAZolam, 0.5 mg, oral, Daily  ceFAZolin, 2 g, intravenous, q8h  cinacalcet, 30 mg, oral, Daily with breakfast  dorzolamide-timoloL, 1 drop, Both Eyes, BID  enoxaparin, 30 mg, subcutaneous, q12h  folic acid, 1 mg, oral, Daily  latanoprost, 1 drop, Both Eyes, Nightly  levothyroxine, 150 mcg, oral, Daily  multivitamin with minerals, 1 tablet, oral, Daily  pantoprazole, 40 mg, oral, Daily before breakfast  polyethylene glycol, 17 g, oral, BID  QUEtiapine, 100 mg, oral, Nightly  sennosides, 2 tablet, oral, BID  sennosides-docusate sodium, 2 tablet, oral, BID  thiamine, 100 mg, oral, Daily      Continuous medications         PRN medications  PRN medications: albuterol, melatonin,  metoprolol, naloxone, ondansetron, oxyCODONE, oxyCODONE, oxyCODONE-acetaminophen  Results for orders placed or performed during the hospital encounter of 02/28/25 (from the past 24 hours)   CBC   Result Value Ref Range    WBC 8.6 4.4 - 11.3 x10*3/uL    nRBC 0.0 0.0 - 0.0 /100 WBCs    RBC 4.61 4.50 - 5.90 x10*6/uL    Hemoglobin 14.4 13.5 - 17.5 g/dL    Hematocrit 43.3 41.0 - 52.0 %    MCV 94 80 - 100 fL    MCH 31.2 26.0 - 34.0 pg    MCHC 33.3 32.0 - 36.0 g/dL    RDW 12.9 11.5 - 14.5 %    Platelets 412 150 - 450 x10*3/uL   Basic Metabolic Panel   Result Value Ref Range    Glucose 104 (H) 74 - 99 mg/dL    Sodium 136 136 - 145 mmol/L    Potassium 4.2 3.5 - 5.3 mmol/L    Chloride 102 98 - 107 mmol/L    Bicarbonate 25 21 - 32 mmol/L    Anion Gap 13 10 - 20 mmol/L    Urea Nitrogen 13 6 - 23 mg/dL    Creatinine 0.89 0.50 - 1.30 mg/dL    eGFR >90 >60 mL/min/1.73m*2    Calcium 11.4 (H) 8.6 - 10.3 mg/dL   Type and screen   Result Value Ref Range    ABO TYPE A     Rh TYPE NEG     ANTIBODY SCREEN NEG    CBC   Result Value Ref Range    WBC 7.2 4.4 - 11.3 x10*3/uL    nRBC 0.0 0.0 - 0.0 /100 WBCs    RBC 4.04 (L) 4.50 - 5.90 x10*6/uL    Hemoglobin 12.7 (L) 13.5 - 17.5 g/dL    Hematocrit 38.6 (L) 41.0 - 52.0 %    MCV 96 80 - 100 fL    MCH 31.4 26.0 - 34.0 pg    MCHC 32.9 32.0 - 36.0 g/dL    RDW 12.6 11.5 - 14.5 %    Platelets 338 150 - 450 x10*3/uL   Basic Metabolic Panel   Result Value Ref Range    Glucose 80 74 - 99 mg/dL    Sodium 137 136 - 145 mmol/L    Potassium 4.1 3.5 - 5.3 mmol/L    Chloride 102 98 - 107 mmol/L    Bicarbonate 25 21 - 32 mmol/L    Anion Gap 14 10 - 20 mmol/L    Urea Nitrogen 14 6 - 23 mg/dL    Creatinine 0.82 0.50 - 1.30 mg/dL    eGFR >90 >60 mL/min/1.73m*2    Calcium 10.5 (H) 8.6 - 10.3 mg/dL           Assessment/Plan              Assessment & Plan  Fall, initial encounter    Hypercalcemia    Fall    Elevated lactic acid level    Tibial plateau fracture, left    Lipohemarthrosis      60 y.o. male   PMH;  esophageal varices, hypothyroidism, HLP, liver cirrhosis.   Patient is presenting with left knee pain.  He reportedly fell on steps last night when he was intoxicated.  He denies loss of consciousness but reportedly did hit his head.  He was brought by EMS to [facility] where x-rays were obtained demonstrating a lateral tibial plateau fracture.  Orthopedics was consulted.  The patient was subsequently admitted to the hospital.  He has undergone previous surgery on this knee with an ACL reconstruction and some form of meniscal surgery back in 2014.      // Left lateral tibial plateau fracture  Ortho is on consult.   PT/OT     03/06; ORIF, FRACTURE, TIBIA, PLATEAU  28789 - [place] OPEN TX TIBIAL FRACTURE PROXIMAL UNICONDYLAR    Pt/ot   Dc planning.        // Hypercalcemia  // Hyperparathyroidism.   - Ionized Ca; 1.44  - PTH; 94  - 25D; 28  - 1,25D; 87   - Phos; 2.3  - Check K/L 0.98   - Check SPEP with IF, neg.     TSH and cortisol, not significant. Has hypothyro with elevated TPO.   Will start cinacalcet 30 every day   Will avoid bisphosphonate cooper operatively.         Kelvin Cuellar MD

## 2025-03-07 NOTE — PROGRESS NOTES
Occupational Therapy  Re-Evaluation    Patient Name: Sp Morrison  MRN: [id number]  Today's Date: 3/7/2025  Time Calculation  Start Time: 1333  Stop Time: 1343  Time Calculation (min): 10 min  4106/4106-A    Assessment  IP OT Assessment  OT Assessment: Patient demonstrates decreased independence with self care, decreased independence with functional transfers, decreased endurance, decreased balance and decrease strength.  Patient will benefit from skilled OT to address deficits. Anticipate patient will benefit from high intensity therapy post hospital stay.  Prognosis: Good  Barriers to Discharge Home: No anticipated barriers  Evaluation/Treatment Tolerance: Patient tolerated treatment well  Medical Staff Made Aware: Yes  End of Session Communication: Bedside nurse  End of Session Patient Position: Up in chair, Alarm on    Plan:  Treatment Interventions: ADL retraining, Functional transfer training, UE strengthening/ROM, Endurance training, Patient/family training  OT Frequency: Daily  OT Discharge Recommendations: High intensity level of continued care  Equipment Recommended upon Discharge: Wheeled walker, Wheelchair  OT Recommended Transfer Status: Minimal assist, Assist of 2  OT - OK to Discharge: Yes (to next level of care when medically cleared by physician)    Subjective     Current Problem:  1. Fall, initial encounter        2. Closed fracture of left tibial plateau, initial encounter  polyethylene glycol (Glycolax, Miralax) 17 gram packet    oxyCODONE (Roxicodone) 5 mg immediate release tablet    acetaminophen (Tylenol) 325 mg tablet    Case Request Operating Room: ORIF, FRACTURE, TIBIA, PLATEAU    Case Request Operating Room: ORIF, FRACTURE, TIBIA, PLATEAU    enoxaparin (Lovenox) 40 mg/0.4 mL syringe      3. Hyperparathyroidism (Multi)  cinacalcet (Sensipar) 30 mg tablet      4. Alcohol use  folic acid (Folvite) 1 mg tablet    multivitamin with minerals tablet    thiamine (Vitamin B-1) 100 mg tablet       5. Constipation, unspecified constipation type  polyethylene glycol (Glycolax, Miralax) 17 gram packet    sennosides-docusate sodium (Ani-Colace) 8.6-50 mg tablet    sennosides (Senokot) 8.6 mg tablet          General:  General  Reason for Referral: Re-evaluation s/p left ORIF of tibia after left tibial plateau fracture  Referred By: Dr. Cuellar  Past Medical History Relevant to Rehab: Admitted 2/28/2025 after falling down 8 steps.  Xray left knee (+) lateral tibial plateau fracture.  Co-Treatment: PT  Co-Treatment Reason: re-eval for dc plan  Prior to Session Communication: Bedside nurse  Patient Position Received: Bed, 3 rail up  Preferred Learning Style: verbal, visual  General Comment: Patient agreeable to therapy.    Precautions:  LE Weight Bearing Status: Left Non-Weight Bearing  Medical Precautions: Fall precautions  Braces Applied: Immobilizer donned L      Pain:  Pain Assessment  Pain Assessment: 0-10  0-10 (Numeric) Pain Score: 10 - Worst possible pain  Pain Type: Surgical pain  Pain Location: Leg  Pain Orientation: Left  Pain Interventions: Repositioned    Objective     Cognition:  Orientation Level: Oriented X4        Home Living:  Type of Home: House  Lives With: Siblings (sister's home)  Home Layout: One level  Home Access: Stairs to enter without rails  Entrance Stairs-Number of Steps: 1  Bathroom Shower/Tub: Walk-in shower  Home Living Comments: Home set up is for sister's house which patient plans to discharge to     Prior Function:  Level of [place]: Independent with homemaking with ambulation, Independent with ADLs and functional transfers      ADL:  LE Dressing Assistance: Maximal    Activity Tolerance:  Endurance: Tolerates 10 - 20 min exercise with multiple rests    Bed Mobility/Transfers:   Bed Mobility  Bed Mobility: Yes  Bed Mobility 1  Bed Mobility 1: Supine to sitting  Level of Assistance 1: Minimum assistance  Transfers  Transfer: Yes  Transfer 1  Transfer From 1: Sit to  Transfer  to 1: Stand  Technique 1: Sit to stand, Stand to sit  Transfer Device 1: Walker, Gait belt  Transfer Level of Assistance 1: Minimum assistance, +2  Transfers 2  Transfer From 2: Bed to  Transfer to 2: Chair with arms  Technique 2: Stand pivot  Transfer Device 2: Walker, Gait belt  Transfer Level of Assistance 2: Minimum assistance, +2, Minimal verbal cues    Ambulation/Gait Training:  Functional Mobility  Functional Mobility Performed: Yes (Patient ambulated with min A with FWW, maintained NWB on left LE)    Outcome Measures: [facility] Daily Activity  Putting on and taking off regular lower body clothing: A lot  Bathing (including washing, rinsing, drying): A lot  Putting on and taking off regular upper body clothing: None  Toileting, which includes using toilet, bedpan or urinal: A lot  Taking care of personal grooming such as brushing teeth: A little  Eating Meals: None  Daily Activity - Total Score: 17           EDUCATION:  Education  Individual(s) Educated: Patient  Education Provided: Fall precautons  Plan of Care Discussed and Agreed Upon: yes  Patient Response to Education: Patient/Caregiver Verbalized Understanding of Information  Education Comment: Education provided on L NWB.      Goals:   Encounter Problems       Encounter Problems (Active)       Dressings Lower Extremities       STG - Patient will complete lower body dressing with mod assist with comp strategies and AE to maintain left LE WB status (Progressing)       Start:  03/01/25    Expected End:  03/21/25               Functional Balance       STG-Patient will be min assist with assistive device dynamic stand task >3 minutes for ADL completion maintaining left LE WB status (Progressing)       Start:  03/01/25    Expected End:  03/21/25               Functional Mobility       STG-Patient will be min assist with assistive device functional mobility tasks  maintaining left LE WB status (Progressing)       Start:  03/01/25    Expected End:  03/21/25                OT Transfers       STG-Patient will be min assist with functional transfers demonstrating good safety  (maintaining left LE WB status) (Progressing)       Start:  03/01/25    Expected End:  03/21/25               Safety       STG-Patient will independently verbalize left LE WB precautions with all functional tasks   (Progressing)       Start:  03/01/25    Expected End:  03/21/25

## 2025-03-07 NOTE — ANESTHESIA POSTPROCEDURE EVALUATION
Patient: Sp Morrison    Procedure Summary       Date: 03/06/25 Room / Location: [facility] OR 06 / [facility]    Anesthesia Start: 1735 Anesthesia Stop: 1848    Procedure: ORIF, FRACTURE, TIBIA, PLATEAU (Left: Leg Lower) Diagnosis:       Closed fracture of left tibial plateau, initial encounter      (Closed fracture of left tibial plateau, initial encounter [S82.142A])    Surgeons: Yeison Whitt MD Responsible Provider: Alexx Weber MD    Anesthesia Type: general ASA Status: 3            Anesthesia Type: general    Vitals Value Taken Time   /75 03/06/25 1930   Temp 36.6 °C (97.9 °F) 03/06/25 1930   Pulse 90 03/06/25 1932   Resp 16 03/06/25 1932   SpO2 90 % 03/06/25 1932   Vitals shown include unfiled device data.    Anesthesia Post Evaluation    Patient location during evaluation: PACU  Patient participation: complete - patient participated  Level of consciousness: awake and alert  Pain management: satisfactory to patient  Airway patency: patent  Cardiovascular status: hemodynamically stable  Respiratory status: spontaneous ventilation  Hydration status: euvolemic  Postoperative Nausea and Vomiting: none        No notable events documented.

## 2025-03-08 ENCOUNTER — NURSING HOME VISIT (OUTPATIENT)
Dept: POST ACUTE CARE | Facility: EXTERNAL LOCATION | Age: 60
End: 2025-03-08
Payer: COMMERCIAL

## 2025-03-08 VITALS
HEIGHT: 71 IN | TEMPERATURE: 98.1 F | DIASTOLIC BLOOD PRESSURE: 89 MMHG | SYSTOLIC BLOOD PRESSURE: 124 MMHG | HEART RATE: 98 BPM | OXYGEN SATURATION: 92 % | BODY MASS INDEX: 33.64 KG/M2 | WEIGHT: 240.3 LBS | RESPIRATION RATE: 18 BRPM

## 2025-03-08 VITALS — HEART RATE: 88 BPM | SYSTOLIC BLOOD PRESSURE: 140 MMHG | DIASTOLIC BLOOD PRESSURE: 80 MMHG

## 2025-03-08 DIAGNOSIS — E03.9 ACQUIRED HYPOTHYROIDISM: ICD-10-CM

## 2025-03-08 DIAGNOSIS — K21.9 GERD WITHOUT ESOPHAGITIS: ICD-10-CM

## 2025-03-08 DIAGNOSIS — S82.142S CLOSED FRACTURE OF LEFT TIBIAL PLATEAU, SEQUELA: Primary | ICD-10-CM

## 2025-03-08 DIAGNOSIS — B18.2 CHRONIC HEPATITIS C WITHOUT HEPATIC COMA (MULTI): ICD-10-CM

## 2025-03-08 DIAGNOSIS — F41.9 ANXIETY: ICD-10-CM
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
## 2025-03-08 ASSESSMENT — ENCOUNTER SYMPTOMS
FATIGUE: 0
NAUSEA: 0
DIZZINESS: 0
ACTIVITY CHANGE: 0
COUGH: 0
VOMITING: 0
DIARRHEA: 0
CONFUSION: 0
WOUND: 1
NERVOUS/ANXIOUS: 0
WEAKNESS: 0
CHOKING: 0
SHORTNESS OF BREATH: 0
ARTHRALGIAS: 1
ABDOMINAL PAIN: 0

## 2025-03-08 NOTE — LETTER
Patient: Sp Morrison  : 1965    Encounter Date: 2025    Subjective  Patient ID: Sp Morrison is a 60 y.o. male who is acute skilled care and presents for initial visit for skilled nursing.    60-year-old male patient has been admitted after having traumatic fall leading to fracture of left tibial plateau, it was a traumatic fracture, patient required surgical fixation of the fracture, postoperatively patient was monitored.  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  He has history of hepatitis C and one of the document says that he has esophageal varices but there is no cirrhosis seen, morphology of the liver was not nodular.  Patient has had extensive CT scans done, nothing was obvious otherwise.  Patient has history of anxiety disorder remains on Xanax, now patient is on oxycodone because of the fracture.  Patient remains on folic acid and PPI treatment.  Also patient has history of recurrent genital herpes, he has regular follow-up with the primary care physician.  All the report and information from hospitalization were reviewed.  Patient is calm and comfortable, prescriptions were written by me, there is a dressings, elastic bandage and immobilizer in the left lower extremity, patient remains on enoxaparin for DVT prophylaxis.  Postoperative labs were reviewed, imaging studies were reviewed, orthopedic follow-up was reviewed.         Review of Systems   Constitutional:  Negative for activity change and fatigue.   HENT:  Negative for congestion.    Respiratory:  Negative for cough, choking and shortness of breath.    Cardiovascular:  Positive for leg swelling. Negative for chest pain.   Gastrointestinal:  Negative for abdominal pain, diarrhea, nausea and vomiting.   Musculoskeletal:  Positive for arthralgias.   Skin:  Positive for wound.   Neurological:   Negative for dizziness and weakness.   Psychiatric/Behavioral:  Negative for confusion. The patient is not nervous/anxious.        Objective  /80   Pulse 88     Physical Exam  Constitutional:       Appearance: Normal appearance. He is obese.   HENT:      Head: Normocephalic.      Nose: Nose normal.   Eyes:      Conjunctiva/sclera: Conjunctivae normal.   Cardiovascular:      Rate and Rhythm: Normal rate and regular rhythm.      Heart sounds: Normal heart sounds.   Pulmonary:      Effort: Pulmonary effort is normal.      Breath sounds: Normal breath sounds.   Abdominal:      Palpations: Abdomen is soft.   Musculoskeletal:         General: Swelling, tenderness and signs of injury present.      Cervical back: Neck supple.   Skin:     General: Skin is warm and dry.      Findings: Wound present.   Neurological:      Mental Status: He is oriented to person, place, and time. Mental status is at baseline.   Psychiatric:         Mood and Affect: Mood normal.         Assessment/Plan  Problem List Items Addressed This Visit             ICD-10-CM    Hypothyroidism E03.9    Anxiety F41.9    GERD without esophagitis K21.9    Hepatitis C virus infection without hepatic coma B19.20    Fall W19.XXXA    Tibial plateau fracture, left - Primary S82.142A   Patient is a tall heavyset male patient, has a traumatic fracture will require nonweightbearing for a good amount of time could be 6 to 8 weeks.  Will require DVT prophylaxis, other medications include oxycodone and Xanax, folic acid, PPI, acyclovir, levothyroxine.  Normally he is fully functional, he lives by himself, history of intermittent alcohol use not heavy amount or daily, history of hepatitis C not sure what exactly is going on with it.  Physical therapy, Occupational Therapy evaluation are in process, other routine safety precautions has to be taken as per the facility protocol.  Understand that patient will be on red flag combination of opioids and benzodiazepines.   Opioids will be required because of the fracture of the tibia.  Stable condition, gradual improvement in the condition is expected, orthopedic will remove the dressings and reviewed the wound.  Pain control is reasonable.  Laboratories will be done as per our routine, standing orders were reviewed, periodic assessment and follow-up will be done, discussed with patient.     Goals    None           Electronically Signed By: Ryan Fang MD   3/8/25  3:45 PM

## 2025-03-08 NOTE — PROGRESS NOTES
Subjective   Patient ID: Sp Morrison is a 60 y.o. male who is acute skilled care and presents for initial visit for skilled nursing.    60-year-old male patient has been admitted after having traumatic fall leading to fracture of left tibial plateau, it was a traumatic fracture, patient required surgical fixation of the fracture, postoperatively patient was monitored.  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  He has history of hepatitis C and one of the document says that he has esophageal varices but there is no cirrhosis seen, morphology of the liver was not nodular.  Patient has had extensive CT scans done, nothing was obvious otherwise.  Patient has history of anxiety disorder remains on Xanax, now patient is on oxycodone because of the fracture.  Patient remains on folic acid and PPI treatment.  Also patient has history of recurrent genital herpes, he has regular follow-up with the primary care physician.  All the report and information from hospitalization were reviewed.  Patient is calm and comfortable, prescriptions were written by me, there is a dressings, elastic bandage and immobilizer in the left lower extremity, patient remains on enoxaparin for DVT prophylaxis.  Postoperative labs were reviewed, imaging studies were reviewed, orthopedic follow-up was reviewed.         Review of Systems   Constitutional:  Negative for activity change and fatigue.   HENT:  Negative for congestion.    Respiratory:  Negative for cough, choking and shortness of breath.    Cardiovascular:  Positive for leg swelling. Negative for chest pain.   Gastrointestinal:  Negative for abdominal pain, diarrhea, nausea and vomiting.   Musculoskeletal:  Positive for arthralgias.   Skin:  Positive for wound.   Neurological:  Negative for dizziness and weakness.   Psychiatric/Behavioral:  Negative for  confusion. The patient is not nervous/anxious.        Objective   /80   Pulse 88     Physical Exam  Constitutional:       Appearance: Normal appearance. He is obese.   HENT:      Head: Normocephalic.      Nose: Nose normal.   Eyes:      Conjunctiva/sclera: Conjunctivae normal.   Cardiovascular:      Rate and Rhythm: Normal rate and regular rhythm.      Heart sounds: Normal heart sounds.   Pulmonary:      Effort: Pulmonary effort is normal.      Breath sounds: Normal breath sounds.   Abdominal:      Palpations: Abdomen is soft.   Musculoskeletal:         General: Swelling, tenderness and signs of injury present.      Cervical back: Neck supple.   Skin:     General: Skin is warm and dry.      Findings: Wound present.   Neurological:      Mental Status: He is oriented to person, place, and time. Mental status is at baseline.   Psychiatric:         Mood and Affect: Mood normal.         Assessment/Plan   Problem List Items Addressed This Visit             ICD-10-CM    Hypothyroidism E03.9    Anxiety F41.9    GERD without esophagitis K21.9    Hepatitis C virus infection without hepatic coma B19.20    Fall W19.XXXA    Tibial plateau fracture, left - Primary S82.142A   Patient is a tall heavyset male patient, has a traumatic fracture will require nonweightbearing for a good amount of time could be 6 to 8 weeks.  Will require DVT prophylaxis, other medications include oxycodone and Xanax, folic acid, PPI, acyclovir, levothyroxine.  Normally he is fully functional, he lives by himself, history of intermittent alcohol use not heavy amount or daily, history of hepatitis C not sure what exactly is going on with it.  Physical therapy, Occupational Therapy evaluation are in process, other routine safety precautions has to be taken as per the facility protocol.  Understand that patient will be on red flag combination of opioids and benzodiazepines.  Opioids will be required because of the fracture of the tibia.  Stable  condition, gradual improvement in the condition is expected, orthopedic will remove the dressings and reviewed the wound.  Pain control is reasonable.  Laboratories will be done as per our routine, standing orders were reviewed, periodic assessment and follow-up will be done, discussed with patient.     Goals    None

## 2025-03-11 ENCOUNTER — APPOINTMENT (OUTPATIENT)
Dept: BEHAVIORAL HEALTH | Facility: CLINIC | Age: 60
End: 2025-03-11
Payer: COMMERCIAL

## 2025-03-11 DIAGNOSIS — G47.00 INSOMNIA, UNSPECIFIED TYPE: ICD-10-CM

## 2025-03-11 DIAGNOSIS — F41.9 ANXIETY: ICD-10-CM

## 2025-03-11 PROCEDURE — 99214 OFFICE O/P EST MOD 30 MIN: CPT | Performed by: PSYCHIATRY & NEUROLOGY

## 2025-03-11 RX ORDER — QUETIAPINE FUMARATE 25 MG/1
TABLET, FILM COATED ORAL
Qty: 150 TABLET | Refills: 2 | Status: SHIPPED | OUTPATIENT
Start: 2025-03-11

## 2025-03-11 RX ORDER — NALOXONE HYDROCHLORIDE 4 MG/.1ML
1 SPRAY NASAL AS NEEDED
Qty: 2 EACH | Refills: 1 | Status: SHIPPED | OUTPATIENT
Start: 2025-03-11

## 2025-03-11 RX ORDER — ALPRAZOLAM 0.5 MG/1
0.5 TABLET ORAL NIGHTLY
Qty: 30 TABLET | Refills: 2 | Status: SHIPPED | OUTPATIENT
Start: 2025-03-11

## 2025-03-11 NOTE — PROGRESS NOTES
Subjective   Patient ID: Sp Morrison is a 60 y.o. male.    HPI  Pt is recovering from recent inpt hospitalization;  Participating in therapy;  Pt denies worsening anxiety;  Review of Systems   Psychiatric/Behavioral:  Negative for dysphoric mood, hallucinations and suicidal ideas. The patient is not nervous/anxious.        Objective   Physical Exam  Psychiatric:         Attention and Perception: Perception normal.         Mood and Affect: Mood is not depressed.         Behavior: Behavior is cooperative.         Thought Content: Thought content is not paranoid. Thought content does not include homicidal or suicidal ideation.         Judgment: Judgment normal.         Assessment/Plan   Diagnoses and all orders for this visit:  Insomnia, unspecified type  -     ALPRAZolam (Xanax) 0.5 mg tablet; Take 1 tablet (0.5 mg) by mouth once daily at bedtime.  -     QUEtiapine (SEROquel) 25 mg tablet; TAKE 4 to 5  TABLETS BY MOUTH AT BEDTIME AS NEEDED.  -     Follow Up In Psychiatry; Future  Anxiety  -     naloxone (Narcan) 4 mg/0.1 mL nasal spray; Administer 1 spray (4 mg) into affected nostril(s) if needed for opioid reversal. May repeat every 2-3 minutes if needed, alternating nostrils, until medical assistance becomes available.  -     Follow Up In Psychiatry; Future

## 2025-03-12 ENCOUNTER — NURSING HOME VISIT (OUTPATIENT)
Dept: POST ACUTE CARE | Facility: EXTERNAL LOCATION | Age: 60
End: 2025-03-12
Payer: COMMERCIAL

## 2025-03-12 VITALS
HEART RATE: 92 BPM | WEIGHT: 239 LBS | DIASTOLIC BLOOD PRESSURE: 90 MMHG | SYSTOLIC BLOOD PRESSURE: 147 MMHG | BODY MASS INDEX: 33.35 KG/M2

## 2025-03-12 DIAGNOSIS — K21.9 GERD WITHOUT ESOPHAGITIS: ICD-10-CM

## 2025-03-12 DIAGNOSIS — B18.2 CHRONIC HEPATITIS C WITHOUT HEPATIC COMA (MULTI): ICD-10-CM

## 2025-03-12 DIAGNOSIS — S82.142S CLOSED FRACTURE OF LEFT TIBIAL PLATEAU, SEQUELA: Primary | ICD-10-CM

## 2025-03-12 DIAGNOSIS — E03.9 ACQUIRED HYPOTHYROIDISM: ICD-10-CM

## 2025-03-12 DIAGNOSIS — F41.9 ANXIETY: ICD-10-CM

## 2025-03-12 PROCEDURE — 99308 SBSQ NF CARE LOW MDM 20: CPT | Performed by: INTERNAL MEDICINE

## 2025-03-12 ASSESSMENT — ENCOUNTER SYMPTOMS
COUGH: 0
VOMITING: 0
WOUND: 1
NAUSEA: 0
SLEEP DISTURBANCE: 1
ACTIVITY CHANGE: 1
ARTHRALGIAS: 1
FATIGUE: 0
WEAKNESS: 0
SHORTNESS OF BREATH: 0

## 2025-03-12 NOTE — LETTER
Patient: Sp Morrison  : 1965    Encounter Date: 2025    Subjective  Patient ID: Sp Morrison is a 60 y.o. male who is acute skilled care being seen and evaluated for multiple medical problems.    Patient was reassessed, he has been admitted after having fall and the fracture of left tibia.  Patient has history of intermittent alcohol consumption not necessarily heavy alcohol consumption or heavy binge drinking.  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  His pain control is reasonable, he has been telling me that he would like to be transferred to another facility.  No other changes identified, physical therapy assessment has been done, patient remains on DVT prophylaxis, acyclovir, oxycodone, folic acid.         Review of Systems   Constitutional:  Positive for activity change. Negative for fatigue.   Respiratory:  Negative for cough and shortness of breath.    Cardiovascular:  Negative for chest pain and leg swelling.   Gastrointestinal:  Negative for nausea and vomiting.   Musculoskeletal:  Positive for arthralgias and gait problem.   Skin:  Positive for wound.   Neurological:  Negative for weakness.   Psychiatric/Behavioral:  Positive for sleep disturbance.        Objective  /90   Pulse 92   Wt 108 kg (239 lb)   BMI 33.35 kg/m²     Physical Exam  Vitals reviewed.   Constitutional:       Appearance: Normal appearance. He is obese.   HENT:      Head: Normocephalic.   Eyes:      Conjunctiva/sclera: Conjunctivae normal.   Cardiovascular:      Rate and Rhythm: Normal rate and regular rhythm.   Pulmonary:      Effort: Pulmonary effort is normal.      Breath sounds: Normal breath sounds.   Abdominal:      Palpations: Abdomen is soft.   Musculoskeletal:         General: Tenderness and signs of injury present.      Cervical back: Neck supple.   Skin:     General: Skin is warm and dry.      Findings:  Wound present.      Comments: Immobilizer and dressings left lower limb   Neurological:      Mental Status: Mental status is at baseline.   Psychiatric:         Mood and Affect: Mood normal.         Assessment/Plan  Problem List Items Addressed This Visit             ICD-10-CM    Anxiety F41.9    GERD without esophagitis K21.9    Hepatitis C virus infection without hepatic coma B19.20    Acquired hypothyroidism E03.9    Tibial plateau fracture, left - Primary S82.142A   He has been doing well, chronic Xanax and Seroquel treatment to be continued.  No chest pains, wound was not examined again, told nursing staff to arrange this patient to be transferred to another facility of his own choice.  Will require some time in a skilled nursing facility because of nonweightbearing status, patient will continue to require periodic monitoring and assessment, laboratories will be followed, appropriate orthopedic follow-up should be maintained, hepatitis C infection is by history, there is no acute problem, not reported to have significant hepatic impairment.  Patient remains euthyroid clinically and chemically, chronic Seroquel treatment to be continued, he is a full-time employed, need to get back to his feet slowly and gradually although tibial fracture take some time.  If transferred to another facility patient will be followed at another facility MD.  So far his condition has been well and stable here.     Goals    None           Electronically Signed By: Ryan Fang MD   3/12/25 10:10 PM

## 2025-03-13 NOTE — PROGRESS NOTES
Subjective   Patient ID: Sp Morrison is a 60 y.o. male who is acute skilled care being seen and evaluated for multiple medical problems.    Patient was reassessed, he has been admitted after having fall and the fracture of left tibia.  Patient has history of intermittent alcohol consumption not necessarily heavy alcohol consumption or heavy binge drinking.  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  His pain control is reasonable, he has been telling me that he would like to be transferred to another facility.  No other changes identified, physical therapy assessment has been done, patient remains on DVT prophylaxis, acyclovir, oxycodone, folic acid.         Review of Systems   Constitutional:  Positive for activity change. Negative for fatigue.   Respiratory:  Negative for cough and shortness of breath.    Cardiovascular:  Negative for chest pain and leg swelling.   Gastrointestinal:  Negative for nausea and vomiting.   Musculoskeletal:  Positive for arthralgias and gait problem.   Skin:  Positive for wound.   Neurological:  Negative for weakness.   Psychiatric/Behavioral:  Positive for sleep disturbance.        Objective   /90   Pulse 92   Wt 108 kg (239 lb)   BMI 33.35 kg/m²     Physical Exam  Vitals reviewed.   Constitutional:       Appearance: Normal appearance. He is obese.   HENT:      Head: Normocephalic.   Eyes:      Conjunctiva/sclera: Conjunctivae normal.   Cardiovascular:      Rate and Rhythm: Normal rate and regular rhythm.   Pulmonary:      Effort: Pulmonary effort is normal.      Breath sounds: Normal breath sounds.   Abdominal:      Palpations: Abdomen is soft.   Musculoskeletal:         General: Tenderness and signs of injury present.      Cervical back: Neck supple.   Skin:     General: Skin is warm and dry.      Findings: Wound present.      Comments: Immobilizer and dressings left lower limb    Neurological:      Mental Status: Mental status is at baseline.   Psychiatric:         Mood and Affect: Mood normal.         Assessment/Plan   Problem List Items Addressed This Visit             ICD-10-CM    Anxiety F41.9    GERD without esophagitis K21.9    Hepatitis C virus infection without hepatic coma B19.20    Acquired hypothyroidism E03.9    Tibial plateau fracture, left - Primary S82.142A   He has been doing well, chronic Xanax and Seroquel treatment to be continued.  No chest pains, wound was not examined again, told nursing staff to arrange this patient to be transferred to another facility of his own choice.  Will require some time in a skilled nursing facility because of nonweightbearing status, patient will continue to require periodic monitoring and assessment, laboratories will be followed, appropriate orthopedic follow-up should be maintained, hepatitis C infection is by history, there is no acute problem, not reported to have significant hepatic impairment.  Patient remains euthyroid clinically and chemically, chronic Seroquel treatment to be continued, he is a full-time employed, need to get back to his feet slowly and gradually although tibial fracture take some time.  If transferred to another facility patient will be followed at another facility MD.  So far his condition has been well and stable here.     Goals    None

## 2025-03-14 NOTE — DISCHARGE SUMMARY
Discharge Diagnosis  Tibial plateau fracture, left    Issues Requiring Follow-Up      Test Results Pending At Discharge  Pending Labs       No current pending labs.            Hospital Course   60 y.o. male   PMH; esophageal varices, hypothyroidism, HLP, liver cirrhosis.   Patient is presenting with left knee pain.  He reportedly fell on steps last night when he was intoxicated.  He denies loss of consciousness but reportedly did hit his head.  [place] [facility] demonstrating a lateral tibial plateau fracture.  Orthopedics was consulted.  The patient was subsequently admitted to the hospital.  He has undergone previous surgery on this knee with an ACL reconstruction and some form of meniscal surgery back in 2014.       // Left lateral tibial plateau fracture  Ortho is on consult.   PT/OT      03/06; ORIF, FRACTURE, TIBIA, PLATEAU  18929 - NE OPEN TX TIBIAL FRACTURE PROXIMAL UNICONDYLAR     Pt/ot   Dc planning.        // Hypercalcemia  // Hyperparathyroidism.   - Ionized Ca; 1.44  - PTH; 94  - 25D; 28  - 1,25D; 87   - Phos; 2.3  - Check K/L 0.98   - Check SPEP with IF, neg.      TSH and cortisol, not significant. Has hypothyro with elevated TPO.   Will start cinacalcet 30 every day   Will avoid bisphosphonate cooper operatively.       Pertinent Physical Exam At Time of Discharge  Physical Exam  GENERAL: No distress.   SKIN: No rashes or lesions.  EYES: PERRLA, EOMI  HEENT: Head: Normocephalic  Neck: supple and no adenopathy  LUNGS: Lungs clear to auscultation.   CARDIAC: Normal S1 and S2; no murmurs.   ABDOMEN: Soft, non-tender.   EXTREMITIES: No ulcers, Extremities normal.   NEURO: No focal deficit.     Outpatient Follow-Up  Future Appointments   Date Time Provider Department Center   6/3/2025  2:00 PM Geena Hunter MD [id number] [facility]   8/5/2025  1:40 PM Tyler Murray MD [id number] [place]   8/12/2025  1:30 PM Rafael PINEDA MD [id number] [facility]    8/26/2025  1:00 PM Geena Hunter MD [id number] [facility]   Time spent on coordinating dc was 40 mins       Kelvin Cuellar MD

## 2025-03-16 ENCOUNTER — DOCUMENTATION (OUTPATIENT)
Dept: HOME HEALTH SERVICES | Facility: HOME HEALTH | Age: 60
End: 2025-03-16
Payer: COMMERCIAL

## 2025-03-16 ENCOUNTER — HOME HEALTH ADMISSION (OUTPATIENT)
Dept: HOME HEALTH SERVICES | Facility: HOME HEALTH | Age: 60
End: 2025-03-16
Payer: COMMERCIAL

## 2025-03-16 NOTE — HH CARE COORDINATION
Home Care received a Referral for Nursing, Physical Therapy, Occupational Therapy, and Home Health Aide. We have processed the referral for a Start of Care on 3.18 OR 3.19.25.     If you have any questions or concerns, please feel free to contact us at [phone]. Follow the prompts, enter your five digit zip code, and you will be directed to your care team on WEST 3.

## 2025-03-18 ENCOUNTER — HOME CARE VISIT (OUTPATIENT)
Dept: HOME HEALTH SERVICES | Facility: HOME HEALTH | Age: 60
End: 2025-03-18
Payer: COMMERCIAL

## 2025-03-18 VITALS
SYSTOLIC BLOOD PRESSURE: 122 MMHG | OXYGEN SATURATION: 95 % | HEART RATE: 85 BPM | DIASTOLIC BLOOD PRESSURE: 80 MMHG | TEMPERATURE: 99.3 F

## 2025-03-18 PROCEDURE — G0299 HHS/HOSPICE OF RN EA 15 MIN: HCPCS

## 2025-03-18 PROCEDURE — G0151 HHCP-SERV OF PT,EA 15 MIN: HCPCS

## 2025-03-18 SDOH — HEALTH STABILITY: PHYSICAL HEALTH: EXERCISE TYPE: BLE PER HEP

## 2025-03-18 ASSESSMENT — ENCOUNTER SYMPTOMS
APPETITE LEVEL: FAIR
SHORTNESS OF BREATH: 1
CONSTIPATION: 1
PAIN: 1
DYSPNEA ACTIVITY LEVEL: AFTER AMBULATING MORE THAN 20 FT
PAIN SEVERITY GOAL: 0/10
PAIN LOCATION: LEFT KNEE
LIMITED RANGE OF MOTION: 1
PAIN LOCATION: LEFT KNEE
PAIN: 1
MUSCLE WEAKNESS: 1
PERSON REPORTING PAIN: PATIENT
HIGHEST PAIN SEVERITY IN PAST 24 HOURS: 8/10
LOWEST PAIN SEVERITY IN PAST 24 HOURS: 6/10
CHANGE IN APPETITE: UNCHANGED
PAIN LOCATION - PAIN SEVERITY: 7/10
PERSON REPORTING PAIN: PATIENT
LAST BOWEL MOVEMENT: 67281

## 2025-03-18 ASSESSMENT — ACTIVITIES OF DAILY LIVING (ADL)
AMBULATION_DISTANCE/DURATION_TOLERATED: 25'
AMBULATION ASSISTANCE: STAND BY ASSIST
CURRENT_FUNCTION: STAND BY ASSIST
AMBULATION ASSISTANCE: STAND BY ASSIST
AMBULATION ASSISTANCE: 1
CURRENT_FUNCTION: STAND BY ASSIST
PHYSICAL TRANSFERS ASSESSED: 1
ENTERING_EXITING_HOME: MINIMUM ASSIST
AMBULATION ASSISTANCE ON FLAT SURFACES: 1
OASIS_M1830: 03

## 2025-03-18 ASSESSMENT — LIFESTYLE VARIABLES: SMOKING_STATUS: 0

## 2025-03-19 ENCOUNTER — HOME CARE VISIT (OUTPATIENT)
Dept: HOME HEALTH SERVICES | Facility: HOME HEALTH | Age: 60
End: 2025-03-19
Payer: COMMERCIAL

## 2025-03-19 ENCOUNTER — DOCUMENTATION (OUTPATIENT)
Dept: CARE COORDINATION | Facility: CLINIC | Age: 60
End: 2025-03-19
Payer: COMMERCIAL

## 2025-03-19 PROCEDURE — G0152 HHCP-SERV OF OT,EA 15 MIN: HCPCS | Performed by: OCCUPATIONAL THERAPIST

## 2025-03-20 ENCOUNTER — PATIENT OUTREACH (OUTPATIENT)
Dept: CARE COORDINATION | Facility: CLINIC | Age: 60
End: 2025-03-20
Payer: COMMERCIAL

## 2025-03-20 ENCOUNTER — HOME CARE VISIT (OUTPATIENT)
Dept: HOME HEALTH SERVICES | Facility: HOME HEALTH | Age: 60
End: 2025-03-20
Payer: COMMERCIAL

## 2025-03-20 PROCEDURE — G0157 HHC PT ASSISTANT EA 15: HCPCS | Mod: CQ

## 2025-03-20 SDOH — ECONOMIC STABILITY: FOOD INSECURITY
ARE ANY OF YOUR NEEDS URGENT? FOR EXAMPLE, UNCERTAINTY OF WHERE YOU WILL GET YOUR NEXT MEAL OR NOT HAVING THE MEDICATIONS YOU NEED TO TAKE TOMORROW.: NO

## 2025-03-20 SDOH — ECONOMIC STABILITY: GENERAL: WOULD YOU LIKE HELP WITH ANY OF THE FOLLOWING NEEDS?: I DONT NEED HELP WITH ANY OF THESE

## 2025-03-20 ASSESSMENT — ENCOUNTER SYMPTOMS
PERSON REPORTING PAIN: PATIENT
PAIN SEVERITY GOAL: 2/10
PAIN LOCATION: LEFT KNEE
PERSON REPORTING PAIN: PATIENT
PAIN: 1
LOWEST PAIN SEVERITY IN PAST 24 HOURS: 6/10
PAIN LOCATION: LEFT LEG
HIGHEST PAIN SEVERITY IN PAST 24 HOURS: 7/10
PAIN: 1
HIGHEST PAIN SEVERITY IN PAST 24 HOURS: 7/10
PAIN LOCATION - PAIN SEVERITY: 5/10

## 2025-03-20 ASSESSMENT — ACTIVITIES OF DAILY LIVING (ADL)
PHYSICAL TRANSFERS ASSESSED: 1
CURRENT_FUNCTION: STAND BY ASSIST
AMBULATION ASSISTANCE: STAND BY ASSIST
TOILETING: 1
TOILETING: STAND BY ASSIST
DRESSING_LB_CURRENT_FUNCTION: MINIMUM ASSIST
DRESSING_UB_CURRENT_FUNCTION: SUPERVISION
PREPARING MEALS: NEEDS ASSISTANCE
BATHING ASSESSED: 1
BATHING_CURRENT_FUNCTION: MINIMUM ASSIST
AMBULATION ASSISTANCE: 1

## 2025-03-20 NOTE — PROGRESS NOTES
Reviewed chart prior to patient outreach. Outreach call to patient to support a smooth transition of care from recent admission.    Discharge facility: [facility]  Discharge diagnosis: Tibial plateau fracture, left   Admission date: 2/28/25  Discharge date: 3/8/25    [facility]  3/8-3/17/25    Specialist Appointment Date: 3/24/25 ortho    Spoke with patient, reviewed discharge medications, discharge instructions, assessed social needs, and provided education on importance of follow-up appointment with provider.     See  Hospital Encounter and Summary, and Discharge assessment below for further details. Information obtained from discharge summary from EMR.    Hospital Course   60 y.o. male   PMH; esophageal varices, hypothyroidism, HLP, liver cirrhosis.   Patient is presenting with left knee pain.  He reportedly fell on steps last night when he was intoxicated.  He denies loss of consciousness but reportedly did hit his head.  He was brought by EMS to [facility] where x-rays were obtained demonstrating a lateral tibial plateau fracture.  Orthopedics was consulted.  The patient was subsequently admitted to the hospital.  He has undergone previous surgery on this knee with an ACL reconstruction and some form of meniscal surgery back in 2014.       // Left lateral tibial plateau fracture  Ortho is on consult.   PT/OT      03/06; ORIF, FRACTURE, TIBIA, PLATEAU  60053 - SC OPEN TX TIBIAL FRACTURE PROXIMAL UNICONDYLAR     Pt/ot   Dc planning.        // Hypercalcemia  // Hyperparathyroidism.   - Ionized Ca; 1.44  - PTH; 94  - 25D; 28  - 1,25D; 87   - Phos; 2.3  - Check K/L 0.98   - Check SPEP with IF, neg.      TSH and cortisol, not significant. Has hypothyro with elevated TPO.   Will start cinacalcet 30 every day   Will avoid bisphosphonate cooper operatively.        [facility]- Patient ID: Sp Morrison is a 60 y.o. male who is acute skilled care being seen  and evaluated for multiple medical problems.  Patient was reassessed, he has been admitted after having fall and the fracture of left tibia.  Patient has history of intermittent alcohol consumption not necessarily heavy alcohol consumption or heavy binge drinking.  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  His pain control is reasonable, he has been telling me that he would like to be transferred to another facility.  No other changes identified, physical therapy assessment has been done, patient remains on DVT prophylaxis, acyclovir, oxycodone, folic acid.     outreach:  Wrap Up  Wrap Up Additional Comments: Spoke to patient's sister, who helps managed his care, and is a nurse. Patient's sister reports that therapy is going very well, positive experience, working with patient to slow down and not bear weight on left side. His incision is healing. Pain is manageable, will try ice as well. Patient has other aches and pains, using Voltaran and Biofreeze on his shoulder. (3/20/2025 11:48 AM)    Medications  Medications reviewed with patient/caregiver?: Yes (3/20/2025 11:48 AM)  Is the patient having any side effects they believe may be caused by any medication additions or changes?: No (3/20/2025 11:48 AM)  Does the patient have all medications ordered at discharge?: Yes (3/20/2025 11:48 AM)  Care Management Interventions: No intervention needed (3/20/2025 11:48 AM)  Is the patient taking all medications as directed (includes completed medication regime)?: Yes (3/20/2025 11:48 AM)    Appointments  Does the patient have a primary care provider?: Yes (3/20/2025 11:48 AM)  Care Management Interventions: Verified appointment date/time/provider (3/20/2025 11:48 AM)  Has the patient kept scheduled appointments due by today?: Not applicable (3/20/2025 11:48 AM)    Self Management  What is the home health agency?:  Home Care (3/20/2025  11:48 AM)  Has home health visited the patient within 72 hours of discharge?: Yes (3/20/2025 11:48 AM)  What Durable Medical Equipment (DME) was ordered?: wheeled walker, wheelchair, brace (3/20/2025 11:48 AM)  Has all Durable Medical Equipment (DME) been delivered?: Yes (3/20/2025 11:48 AM)    Patient Teaching  Does the patient have access to their discharge instructions?: Yes (3/20/2025 11:48 AM)  Care Management Interventions: Reviewed instructions with patient (3/20/2025 11:48 AM)  What is the patient's perception of their health status since discharge?: Improving (3/20/2025 11:48 AM)  Is the patient/caregiver able to teach back the hierarchy of who to call/visit for symptoms/problems? PCP, Specialist, Home Health nurse, Urgent Care, ED, 911: Yes (3/20/2025 11:48 AM)    Will continue to monitor through transition period. Provided patient with my contact information for potential needs.    Chika Chairez RN BSN  ACO Care Manager  [phone]

## 2025-03-20 NOTE — PROGRESS NOTES
Outreach call to patient to support a smooth transition of care from recent admission.  Left voicemail message for patient with my contact information.    Chika Chairez RN BSN  ACO Care Manager  [phone]

## 2025-03-21 ASSESSMENT — ENCOUNTER SYMPTOMS
DYSPHORIC MOOD: 0
HALLUCINATIONS: 0
NERVOUS/ANXIOUS: 0

## 2025-03-25 ENCOUNTER — HOME CARE VISIT (OUTPATIENT)
Dept: HOME HEALTH SERVICES | Facility: HOME HEALTH | Age: 60
End: 2025-03-25
Payer: COMMERCIAL

## 2025-03-25 PROCEDURE — G0157 HHC PT ASSISTANT EA 15: HCPCS | Mod: CQ

## 2025-03-25 PROCEDURE — G0152 HHCP-SERV OF OT,EA 15 MIN: HCPCS | Performed by: OCCUPATIONAL THERAPIST

## 2025-03-25 ASSESSMENT — ENCOUNTER SYMPTOMS
PERSON REPORTING PAIN: PATIENT
DENIES PAIN: 1

## 2025-03-26 ENCOUNTER — HOME CARE VISIT (OUTPATIENT)
Dept: HOME HEALTH SERVICES | Facility: HOME HEALTH | Age: 60
End: 2025-03-26
Payer: COMMERCIAL

## 2025-03-26 VITALS
HEART RATE: 77 BPM | TEMPERATURE: 99.1 F | OXYGEN SATURATION: 95 % | SYSTOLIC BLOOD PRESSURE: 120 MMHG | DIASTOLIC BLOOD PRESSURE: 74 MMHG

## 2025-03-26 PROCEDURE — G0299 HHS/HOSPICE OF RN EA 15 MIN: HCPCS

## 2025-03-26 ASSESSMENT — ENCOUNTER SYMPTOMS
PAIN LOCATION: LEFT KNEE
CHANGE IN APPETITE: UNCHANGED
LOWER EXTREMITY EDEMA: 1
PAIN: 1
APPETITE LEVEL: GOOD
PAIN LOCATION - PAIN SEVERITY: 6/10
PERSON REPORTING PAIN: PATIENT
LAST BOWEL MOVEMENT: 67290

## 2025-03-26 ASSESSMENT — ACTIVITIES OF DAILY LIVING (ADL)
CURRENT_FUNCTION: STAND BY ASSIST
AMBULATION ASSISTANCE: STAND BY ASSIST

## 2025-03-27 ENCOUNTER — HOME CARE VISIT (OUTPATIENT)
Dept: HOME HEALTH SERVICES | Facility: HOME HEALTH | Age: 60
End: 2025-03-27
Payer: COMMERCIAL

## 2025-04-01 ENCOUNTER — PATIENT MESSAGE (OUTPATIENT)
Dept: PRIMARY CARE | Facility: CLINIC | Age: 60
End: 2025-04-01
Payer: COMMERCIAL

## 2025-04-01 ENCOUNTER — HOME CARE VISIT (OUTPATIENT)
Dept: HOME HEALTH SERVICES | Facility: HOME HEALTH | Age: 60
End: 2025-04-01
Payer: COMMERCIAL

## 2025-04-01 DIAGNOSIS — E03.9 HYPOTHYROIDISM, UNSPECIFIED TYPE: ICD-10-CM

## 2025-04-01 PROCEDURE — G0157 HHC PT ASSISTANT EA 15: HCPCS | Mod: CQ

## 2025-04-01 ASSESSMENT — ENCOUNTER SYMPTOMS
PERSON REPORTING PAIN: PATIENT
DENIES PAIN: 1

## 2025-04-02 ENCOUNTER — HOME CARE VISIT (OUTPATIENT)
Dept: HOME HEALTH SERVICES | Facility: HOME HEALTH | Age: 60
End: 2025-04-02
Payer: COMMERCIAL

## 2025-04-02 PROCEDURE — G0152 HHCP-SERV OF OT,EA 15 MIN: HCPCS | Performed by: OCCUPATIONAL THERAPIST

## 2025-04-03 ENCOUNTER — HOME CARE VISIT (OUTPATIENT)
Dept: HOME HEALTH SERVICES | Facility: HOME HEALTH | Age: 60
End: 2025-04-03
Payer: COMMERCIAL

## 2025-04-03 DIAGNOSIS — E21.3 HYPERPARATHYROIDISM (MULTI): ICD-10-CM

## 2025-04-03 DIAGNOSIS — Z79.899 POLYPHARMACY: Primary | ICD-10-CM

## 2025-04-03 PROCEDURE — G0151 HHCP-SERV OF PT,EA 15 MIN: HCPCS

## 2025-04-03 RX ORDER — CINACALCET 30 MG/1
30 TABLET, FILM COATED ORAL
Qty: 30 TABLET | Refills: 0 | Status: SHIPPED | OUTPATIENT
Start: 2025-04-03

## 2025-04-03 SDOH — HEALTH STABILITY: PHYSICAL HEALTH: EXERCISE TYPE: BLE PER HEP

## 2025-04-03 ASSESSMENT — ACTIVITIES OF DAILY LIVING (ADL)
AMBULATION ASSISTANCE ON FLAT SURFACES: 1
CURRENT_FUNCTION: INDEPENDENT
AMBULATION ASSISTANCE: 1
AMBULATION ASSISTANCE: INDEPENDENT
AMBULATION_DISTANCE/DURATION_TOLERATED: 50'
PHYSICAL TRANSFERS ASSESSED: 1

## 2025-04-03 ASSESSMENT — ENCOUNTER SYMPTOMS
DENIES PAIN: 1
PAIN SEVERITY GOAL: 0/10
LOWEST PAIN SEVERITY IN PAST 24 HOURS: 6/10
PERSON REPORTING PAIN: PATIENT
PAIN: 1
PAIN LOCATION: LEFT KNEE
HIGHEST PAIN SEVERITY IN PAST 24 HOURS: 7/10

## 2025-04-04 ASSESSMENT — ACTIVITIES OF DAILY LIVING (ADL)
OASIS_M1830: 00
HOME_HEALTH_OASIS: 00

## 2025-04-11 ENCOUNTER — TELEMEDICINE (OUTPATIENT)
Dept: PHARMACY | Facility: HOSPITAL | Age: 60
End: 2025-04-11
Payer: COMMERCIAL

## 2025-04-11 DIAGNOSIS — E21.3 HYPERPARATHYROIDISM (MULTI): ICD-10-CM
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
## 2025-04-11 DIAGNOSIS — K21.9 GERD WITHOUT ESOPHAGITIS: Primary | ICD-10-CM

## 2025-04-11 DIAGNOSIS — E83.52 HYPERCALCEMIA: ICD-10-CM

## 2025-04-11 DIAGNOSIS — Z79.899 POLYPHARMACY: ICD-10-CM

## 2025-04-11 DIAGNOSIS — E55.9 VITAMIN D DEFICIENCY: ICD-10-CM

## 2025-04-11 DIAGNOSIS — E03.9 ACQUIRED HYPOTHYROIDISM: ICD-10-CM

## 2025-04-11 RX ORDER — CHOLECALCIFEROL (VITAMIN D3) 25 MCG
25 TABLET ORAL DAILY
COMMUNITY

## 2025-04-11 NOTE — PROGRESS NOTES
Clinical Pharmacy Appointment    Patient ID: [id number] Sp Morrison is a 60 y.o. male who presents for First appointment. Reason for Referral: polypharmacy and med rec -  Referring Provider: Rafael Holliday MD  PCP: Rafael Holliday MD       Subjective     PMH:   Past Medical History:   Diagnosis Date    Acquired hypothyroidism 10/09/2015    Anxiety     Chondromalacia of right patella     Chronic prescription benzodiazepine use     Erectile dysfunction     Esophageal varices without bleeding, unspecified esophageal varices type (Multi) 05/23/2024    GERD (gastroesophageal reflux disease)     GERD without esophagitis 12/12/2016    Glaucoma     H/O esophageal varices     History of left knee replacement     Hypercalcemia     Hyperlipidemia     Hyperparathyroidism (Multi) 05/23/2024    Hypothyroidism     Insomnia     Left anterior cruciate ligament tear 02/12/2025    Liver cirrhosis (Multi)     Recurrent genital herpes 10/25/2013    Rupture of anterior cruciate ligament of left knee, initial encounter     Tear of meniscus of left knee     Vitamin D deficiency 01/07/2016        Social Hx:  Lives with sister since he fell/surgery as his home has too many steps. Will be there until July  Has stopped drinking alcohol since his hospitalization  Experience working with [facility]    Medication System Management  Patient's preferred pharmacy:     [id number] - [facility] [address] [facility]  [address] [facility] [address]  Phone: [phone] Fax: [phone]   _____________________________________________________________________________    Labs  Hepatic  Lab Results   Component Value Date    ALT 33 02/28/2025    AST 30 02/28/2025    ALKPHOS 49 02/28/2025    BILITOT 0.4 02/28/2025     Kidney  eGFR Cre: 100 at 3/7/2025  5:15 AM  Calculated from:  Serum Creatinine: 0.82 mg/dL at 3/7/2025  5:15 AM  Age: 60 years  Sex: Male at 3/7/2025  5:15 AM  Calculated using the CKD-EPI Creatinine Equation  (2021)    Lab Results   Component Value Date    GLUCOSE 80 03/07/2025    CALCIUM 10.5 (H) 03/07/2025     03/07/2025    K 4.1 03/07/2025    CO2 25 03/07/2025     03/07/2025    BUN 14 03/07/2025    CREATININE 0.82 03/07/2025     _____________________________________________________________________________    Objective   No Known Allergies  Social History     Social History Narrative    Not on file      Medication Review  Current Outpatient Medications   Medication Instructions    acetaminophen (TYLENOL) 975 mg, oral, Every 8 hours    acyclovir (Zovirax) 400 mg tablet Take 1 tablet twice daily as needed for suppression.    albuterol 90 mcg/actuation inhaler 2 puffs, inhalation, Every 6 hours PRN    ALPRAZolam (XANAX) 0.5 mg, oral, Nightly    cinacalcet (SENSIPAR) 30 mg, oral, Daily with breakfast, Take with food or shortly afer a meal. Swallow tablet whole; do not break or divide.    dorzolamide-timoloL (Cosopt) 22.3-6.8 mg/mL ophthalmic solution 1 drop, Both Eyes, 2 times daily    folic acid (FOLVITE) 1 mg, oral, Daily    latanoprost (Xalatan) 0.005 % ophthalmic solution 1 drop, Both Eyes, Nightly, INSTILL ONE DROP IN EACH EYE EVERY EVENING.    levothyroxine (SYNTHROID, LEVOXYL) 150 mcg, oral, Daily    multivitamin with minerals tablet 1 tablet, oral, Daily    naloxone (NARCAN) 4 mg, nasal, As needed, May repeat every 2-3 minutes if needed, alternating nostrils, until medical assistance becomes available.    omeprazole (PRILOSEC) 20 mg, oral, Daily    oxyCODONE (ROXICODONE) 5 mg, oral, Every 4 hours PRN    polyethylene glycol (GLYCOLAX, MIRALAX) 17 g, oral, Daily    polyethylene glycol (GLYCOLAX, MIRALAX) 17 g, oral, 2 times daily    QUEtiapine (SEROquel) 25 mg tablet TAKE 4 to 5  TABLETS BY MOUTH AT BEDTIME AS NEEDED.    sennosides-docusate sodium (Ani-Colace) 8.6-50 mg tablet 2 tablets, oral, 2 times daily    tadalafil 20 mg, oral, Daily PRN    thiamine (VITAMIN B-1) 100 mg, oral, Daily      Vitals  BP  "Readings from Last 2 Encounters:   03/26/25 120/74   03/18/25 122/80     BMI Readings from Last 1 Encounters:   03/12/25 33.35 kg/m²      Labs  A1C  Lab Results   Component Value Date    HGBA1C 5.7 (H) 02/12/2025    HGBA1C 5.4 08/15/2023    HGBA1C 5.2 08/09/2019     BMP  Lab Results   Component Value Date    CALCIUM 10.5 (H) 03/07/2025     03/07/2025    K 4.1 03/07/2025    CO2 25 03/07/2025     03/07/2025    BUN 14 03/07/2025    CREATININE 0.82 03/07/2025    EGFR >90 03/07/2025     LFTs  Lab Results   Component Value Date    ALT 33 02/28/2025    AST 30 02/28/2025    ALKPHOS 49 02/28/2025    BILITOT 0.4 02/28/2025     FLP  Lab Results   Component Value Date    TRIG 387 (H) 02/12/2025    CHOL 205 (H) 02/12/2025    LDLF 75 08/15/2023    LDLCALC 105 (H) 02/12/2025    HDL 48 02/12/2025     Urine Microalbumin  No results found for: \"MICROALBCREA\"  Weight Management  Wt Readings from Last 3 Encounters:   03/12/25 108 kg (239 lb)   03/03/25 109 kg (240 lb 4.8 oz)   02/19/25 109 kg (240 lb 4.8 oz)      There is no height or weight on file to calculate BMI.     _____________________________________________________________________________    Assessment/Plan     The goals of therapy are \"being met\" with the current medication regimen.    Reinforced healthy diet, lifestyle, and exercise.  Lab work ordered.  Patient refused to decrease benzodiazapine use but has stopped drinking. Patient requested labs for parathyroid and thyroid with costs. Will meet next week to discuss lab results if available.     Clinical Pharmacist follow-up: next week, Telehealth visit    Continue all meds under the continuation of care with the referring provider and clinical pharmacy team.    Carmen Beatty PharmD   Clinical Pharmacist Specialist, Primary Care   Phone: [phone]   Fax: [phone]   Email: [email or web address]    Verbal consent to manage patient's drug therapy was obtained from the patient. They were informed they " may decline to participate or withdraw from participation in pharmacy services at any time

## 2025-04-15 LAB
ALBUMIN SERPL-MCNC: 4.5 G/DL (ref 3.6–5.1)
ALP SERPL-CCNC: 81 U/L (ref 35–144)
ALT SERPL-CCNC: 18 U/L (ref 9–46)
ANION GAP SERPL CALCULATED.4IONS-SCNC: 8 MMOL/L (CALC) (ref 7–17)
AST SERPL-CCNC: 14 U/L (ref 10–35)
BASOPHILS # BLD AUTO: 59 CELLS/UL (ref 0–200)
BASOPHILS NFR BLD AUTO: 1.1 %
BILIRUB SERPL-MCNC: 0.4 MG/DL (ref 0.2–1.2)
BUN SERPL-MCNC: 15 MG/DL (ref 7–25)
CALCIUM SERPL-MCNC: 11.4 MG/DL (ref 8.6–10.3)
CHLORIDE SERPL-SCNC: 105 MMOL/L (ref 98–110)
CO2 SERPL-SCNC: 26 MMOL/L (ref 20–32)
CREAT SERPL-MCNC: 0.95 MG/DL (ref 0.7–1.35)
EGFRCR SERPLBLD CKD-EPI 2021: 91 ML/MIN/1.73M2
EOSINOPHIL # BLD AUTO: 103 CELLS/UL (ref 15–500)
EOSINOPHIL NFR BLD AUTO: 1.9 %
ERYTHROCYTE [DISTWIDTH] IN BLOOD BY AUTOMATED COUNT: 13.1 % (ref 11–15)
GLUCOSE SERPL-MCNC: 114 MG/DL (ref 65–139)
HCT VFR BLD AUTO: 44.1 % (ref 38.5–50)
HGB BLD-MCNC: 14.4 G/DL (ref 13.2–17.1)
LYMPHOCYTES # BLD AUTO: 1674 CELLS/UL (ref 850–3900)
LYMPHOCYTES NFR BLD AUTO: 31 %
MCH RBC QN AUTO: 31.6 PG (ref 27–33)
MCHC RBC AUTO-ENTMCNC: 32.7 G/DL (ref 32–36)
MCV RBC AUTO: 96.7 FL (ref 80–100)
MONOCYTES # BLD AUTO: 513 CELLS/UL (ref 200–950)
MONOCYTES NFR BLD AUTO: 9.5 %
NEUTROPHILS # BLD AUTO: 3051 CELLS/UL (ref 1500–7800)
NEUTROPHILS NFR BLD AUTO: 56.5 %
PHOSPHATE SERPL-MCNC: 4 MG/DL (ref 2.5–4.5)
PLATELET # BLD AUTO: 273 THOUSAND/UL (ref 140–400)
PMV BLD REES-ECKER: 9.7 FL (ref 7.5–12.5)
POTASSIUM SERPL-SCNC: 4.8 MMOL/L (ref 3.5–5.3)
PROT SERPL-MCNC: 7.4 G/DL (ref 6.1–8.1)
PTH-INTACT SERPL-MCNC: 18 PG/ML (ref 16–77)
RBC # BLD AUTO: 4.56 MILLION/UL (ref 4.2–5.8)
SODIUM SERPL-SCNC: 139 MMOL/L (ref 135–146)
TSH SERPL-ACNC: 1.09 MIU/L (ref 0.4–4.5)
WBC # BLD AUTO: 5.4 THOUSAND/UL (ref 3.8–10.8)

## 2025-04-17 ENCOUNTER — PATIENT OUTREACH (OUTPATIENT)
Dept: CARE COORDINATION | Facility: CLINIC | Age: 60
End: 2025-04-17
Payer: COMMERCIAL

## 2025-04-17 NOTE — PROGRESS NOTES
Outreach call to patient to support a smooth transition of care from recent admission.  Left voicemail message for patient with my contact information.    Chika Chairez RN BSN  ACO Care Manager  [phone]

## 2025-04-18 ENCOUNTER — APPOINTMENT (OUTPATIENT)
Dept: PHARMACY | Facility: HOSPITAL | Age: 60
End: 2025-04-18
Payer: COMMERCIAL

## 2025-04-20 DIAGNOSIS — H40.213 ACUTE PRIMARY ANGLE-CLOSURE GLAUCOMA OF BOTH EYES: ICD-10-CM

## 2025-04-21 ENCOUNTER — PATIENT OUTREACH (OUTPATIENT)
Dept: CARE COORDINATION | Facility: CLINIC | Age: 60
End: 2025-04-21
Payer: COMMERCIAL

## 2025-04-21 RX ORDER — LATANOPROST 50 UG/ML
SOLUTION/ DROPS OPHTHALMIC
Qty: 2.5 ML | Refills: 0 | Status: SHIPPED | OUTPATIENT
Start: 2025-04-21

## 2025-04-21 NOTE — PROGRESS NOTES
Reviewed chart prior to patient outreach. Unable to reach patient to support a smooth transition of care from recent admission after multiple attempts.  Left voicemail message for patient with my contact information.      Chika Chairez RN BSN  O Care Manager  [phone]

## 2025-04-24 ENCOUNTER — APPOINTMENT (OUTPATIENT)
Dept: PHARMACY | Facility: HOSPITAL | Age: 60
End: 2025-04-24
Payer: COMMERCIAL

## 2025-04-24 DIAGNOSIS — Z79.899 POLYPHARMACY: Primary | ICD-10-CM

## 2025-04-24 NOTE — PROGRESS NOTES
Clinical Pharmacy Appointment    Patient ID: [id number] Sp Morrison is a 60 y.o. male who presents for Follow Up appointment. Reason for Referral: polypharmacy and med rec -  PCP and Referring Provide Rafael Holliday MD   Subjective     PMH:   Past Medical History:   Diagnosis Date    Acquired hypothyroidism 10/09/2015    Anxiety     Chondromalacia of right patella     Chronic prescription benzodiazepine use     Erectile dysfunction     Esophageal varices without bleeding, unspecified esophageal varices type (Multi) 05/23/2024    GERD (gastroesophageal reflux disease)     GERD without esophagitis 12/12/2016    Glaucoma     H/O esophageal varices     History of left knee replacement     Hypercalcemia     Hyperlipidemia     Hyperparathyroidism (Multi) 05/23/2024    Hypothyroidism     Insomnia     Left anterior cruciate ligament tear 02/12/2025    Liver cirrhosis (Multi)     Recurrent genital herpes 10/25/2013    Rupture of anterior cruciate ligament of left knee, initial encounter     Tear of meniscus of left knee     Vitamin D deficiency 01/07/2016      Social Hx:  Lives with sister since he fell/surgery as his home has too many steps. 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    Medication System Management  Patient's preferred pharmacy:     [id number] - [facility] [address] [facility]  [address] [address] [address]  Phone: [phone] Fax: [phone]   _____________________________________________________________________________    Labs  Hepatic  Lab Results   Component Value Date    ALT 18 04/14/2025    AST 14 04/14/2025    ALKPHOS 81 04/14/2025    BILITOT 0.4 04/14/2025     Kidney  eGFR Cre: 91 at 4/14/2025  2:21 PM  Calculated from:  Serum Creatinine: 0.95 mg/dL at 4/14/2025  2:21 PM  Age: 60 years  Sex: Male at 4/14/2025  2:21  PM  Calculated using the CKD-EPI Creatinine Equation (2021)    Lab Results   Component Value Date    GLUCOSE 114 04/14/2025    CALCIUM 11.4 (H) 04/14/2025     04/14/2025    K 4.8 04/14/2025    CO2 26 04/14/2025     04/14/2025    BUN 15 04/14/2025    CREATININE 0.95 04/14/2025      The 10-year ASCVD risk score (Jennifer CHAN, et al., 2019) is: 13.6%    Values used to calculate the score:      Age: 60 years      Sex: Male      Is Non- : No      Diabetic: No      Tobacco smoker: Yes      Systolic Blood Pressure: 120 mmHg      Is BP treated: No      HDL Cholesterol: 48 mg/dL      Total Cholesterol: 205 mg/dL     ____________________________________________________________________________    Objective   No Known Allergies  Social History     Social History Narrative    Not on file      Medication Review  Current Outpatient Medications   Medication Instructions    acyclovir (Zovirax) 400 mg tablet Take 1 tablet twice daily as needed for suppression.    ALPRAZolam (XANAX) 0.5 mg, oral, Nightly    cholecalciferol (VITAMIN D3) 25 mcg, oral, Daily    cinacalcet (SENSIPAR) 30 mg, oral, Daily with breakfast, Take with food or shortly afer a meal. Swallow tablet whole; do not break or divide.    dorzolamide-timoloL (Cosopt) 22.3-6.8 mg/mL ophthalmic solution 1 drop, Both Eyes, 2 times daily    folic acid (FOLVITE) 1 mg, oral, Daily    latanoprost (Xalatan) 0.005 % ophthalmic solution INSTILL 1 DROP INTO BOTH EYES ONCE DAILY IN THE EVENING AT BEDTIME    levothyroxine (SYNTHROID, LEVOXYL) 150 mcg, oral, Daily    multivitamin with minerals tablet 1 tablet, oral, Daily    naloxone (NARCAN) 4 mg, nasal, As needed, May repeat every 2-3 minutes if needed, alternating nostrils, until medical assistance becomes available.    omeprazole (PRILOSEC) 20 mg, oral, Daily    oxyCODONE (ROXICODONE) 5 mg, oral, Every 4 hours PRN    polyethylene glycol (GLYCOLAX, MIRALAX) 17 g, oral, Daily    QUEtiapine (SEROquel)  "25 mg tablet TAKE 4 to 5  TABLETS BY MOUTH AT BEDTIME AS NEEDED.      Vitals  BP Readings from Last 2 Encounters:   03/26/25 120/74   03/18/25 122/80     BMI Readings from Last 1 Encounters:   03/12/25 33.35 kg/m²      Labs  A1C  Lab Results   Component Value Date    HGBA1C 5.7 (H) 02/12/2025    HGBA1C 5.4 08/15/2023    HGBA1C 5.2 08/09/2019     BMP  Lab Results   Component Value Date    CALCIUM 11.4 (H) 04/14/2025     04/14/2025    K 4.8 04/14/2025    CO2 26 04/14/2025     04/14/2025    BUN 15 04/14/2025    CREATININE 0.95 04/14/2025    EGFR 91 04/14/2025     LFTs  Lab Results   Component Value Date    ALT 18 04/14/2025    AST 14 04/14/2025    ALKPHOS 81 04/14/2025    BILITOT 0.4 04/14/2025     FLP  Lab Results   Component Value Date    TRIG 387 (H) 02/12/2025    CHOL 205 (H) 02/12/2025    LDLF 75 08/15/2023    LDLCALC 105 (H) 02/12/2025    HDL 48 02/12/2025     Urine Microalbumin  No results found for: \"MICROALBCREA\"  Weight Management  Wt Readings from Last 3 Encounters:   03/12/25 108 kg (239 lb)   03/03/25 109 kg (240 lb 4.8 oz)   02/19/25 109 kg (240 lb 4.8 oz)      There is no height or weight on file to calculate BMI.     _____________________________________________________________________________    Assessment/Plan     Reviewed labs  Phosphate:  WNL  Parathyroid: WNL  TSH: WNL  Calcium: HIGH, but same trend as prior  Albumin: WNL    The goals of therapy are \"being met\" with the current medication regimen.  While there is not a large amount of calcium in his daily multivitamin there is 210 mg (16% of DV) of calcium in his centrum silver. Patient denies ADRs. Patient's hba1c has been steadily rising, will continue to monitor. Lipids are high and ASCD risk is 13.4%  Reinforced healthy diet, lifestyle, and exercise.  Lab work ordered.  Patient refused to decrease benzodiazapine use but has stopped drinking. Discussed labs for parathyroid and thyroid with costs. Will meet in 3 months to see how he " is doing.     STOP:  - Multivitamin (centrum silver)    Future Considerations  Statin   Wean off benzodiazepines     Clinical Pharmacist follow-up: next week, Telehealth visit  - healing from fall  - discuss hba1c  - statin?    Continue all meds under the continuation of care with the referring provider and clinical pharmacy team.    Carmen Beatty PharmD   Clinical Pharmacist Specialist, Primary Care   Phone: [phone]   Fax: [phone]   Email: [email or web address]    Verbal consent to manage patient's drug therapy was obtained from the patient. They were informed they may decline to participate or withdraw from participation in pharmacy services at any time

## 2025-05-12 DIAGNOSIS — E21.3 HYPERPARATHYROIDISM (MULTI): ICD-10-CM

## 2025-05-13 RX ORDER — CINACALCET 30 MG/1
30 TABLET, FILM COATED ORAL DAILY
Qty: 30 TABLET | Refills: 0 | Status: SHIPPED | OUTPATIENT
Start: 2025-05-13 | End: 2025-06-12

## 2025-05-14 DIAGNOSIS — H40.213 ACUTE PRIMARY ANGLE-CLOSURE GLAUCOMA OF BOTH EYES: ICD-10-CM

## 2025-05-14 RX ORDER — LATANOPROST 50 UG/ML
SOLUTION/ DROPS OPHTHALMIC
Qty: 2.5 ML | Refills: 0 | Status: SHIPPED | OUTPATIENT
Start: 2025-05-14

## 2025-05-16 ENCOUNTER — APPOINTMENT (OUTPATIENT)
Dept: ORTHOPEDIC SURGERY | Facility: CLINIC | Age: 60
End: 2025-05-16
Payer: COMMERCIAL

## 2025-05-16 ENCOUNTER — HOSPITAL ENCOUNTER (OUTPATIENT)
Dept: RADIOLOGY | Facility: HOSPITAL | Age: 60
Discharge: HOME | End: 2025-05-16
Payer: COMMERCIAL

## 2025-05-16 DIAGNOSIS — S82.122D CLOSED FRACTURE OF LATERAL PORTION OF LEFT TIBIAL PLATEAU WITH ROUTINE HEALING: Primary | ICD-10-CM

## 2025-05-16 DIAGNOSIS — M25.562 LEFT KNEE PAIN, UNSPECIFIED CHRONICITY: ICD-10-CM

## 2025-05-16 PROCEDURE — 73560 X-RAY EXAM OF KNEE 1 OR 2: CPT | Mod: LT

## 2025-05-16 NOTE — LETTER
May 16, 2025     Patient: Sp Morrison   YOB: 1965   Date of Visit: 5/16/2025       To Whom It May Concern:    It is my medical opinion that Sp Morrison is to remain off work through an estimated 06/29/2025.    If you have any questions or concerns, please don't hesitate to call.         Sincerely,        Yeison Whitt MD    CC: No Recipients

## 2025-05-16 NOTE — PROGRESS NOTES
History of Present Illness  Chief Complaint   Patient presents with    Left Knee - Follow-up     X-ray today     Patient doing better overall.  Reports that range of motion of the knee is improving.  Does report pain when putting weight on it however has been told to not put weight on it has been minimizing weightbearing in that regard.    Medical History[1]    Medication Documentation Review Audit       Reviewed by Vanesa WadeD (Pharmacist) on 04/11/25 at 1333      Medication Order Taking? Sig Documenting Provider Last Dose Status   acetaminophen (Tylenol) 325 mg tablet [id number] Yes Take 3 tablets (975 mg) by mouth every 8 hours. BERNARDINO Martin-CNP  Active   acyclovir (Zovirax) 400 mg tablet [id number] Yes Take 1 tablet twice daily as needed for suppression. BERNARDINO Vicente-CNP  Active     Discontinued 04/11/25 1313   ALPRAZolam (Xanax) 0.5 mg tablet [id number] Yes Take 1 tablet (0.5 mg) by mouth once daily at bedtime. Geena Hunter MD  Active   cinacalcet (Sensipar) 30 mg tablet [id number] Yes Take 1 tablet (30 mg) by mouth once daily with breakfast. Take with food or shortly afer a meal. Swallow tablet whole; do not break or divide. Rafael PINEDA MD  Active   dorzolamide-timoloL (Cosopt) 22.3-6.8 mg/mL ophthalmic solution [id number] Yes Administer 1 drop into both eyes 2 times a day.   Patient taking differently: Administer 1 drop into both eyes once daily (M-F).    Zach Tinajero MD  Active     Discontinued 04/11/25 1314   folic acid (Folvite) 1 mg tablet [id number] Yes Take 1 tablet (1 mg) by mouth once daily. BERNARDINO Martin-CNP  Active   latanoprost (Xalatan) 0.005 % ophthalmic solution [id number] Yes Administer 1 drop into both eyes once daily at bedtime. INSTILL ONE DROP IN EACH EYE EVERY EVENING. Zach Tinajero MD  Active   levothyroxine (Synthroid, Levoxyl) 150 mcg tablet [id number] Yes Take 1 tablet (150 mcg) by mouth once daily. Rafael PINEDA MD   Active   multivitamin with minerals tablet [id number] Yes Take 1 tablet by mouth once daily. HUMPHREY Martin  Active   naloxone (Narcan) 4 mg/0.1 mL nasal spray [id number]  Administer 1 spray (4 mg) into affected nostril(s) if needed for opioid reversal. May repeat every 2-3 minutes if needed, alternating nostrils, until medical assistance becomes available. Geena Hunter MD  Active   omeprazole (PriLOSEC) 20 mg DR capsule [id number] Yes Take 1 capsule (20 mg) by mouth once daily. Rafael PINEDA MD  Active   oxyCODONE (Roxicodone) 5 mg immediate release tablet [id number] Yes Take 1 tablet (5 mg) by mouth every 4 hours if needed for moderate pain (4 - 6) or severe pain (7 - 10). HUMPHREY Martin  Active   polyethylene glycol (Glycolax, Miralax) 17 gram packet [id number] Yes Take 17 g by mouth once daily. HUMPHREY Martin  Active     Discontinued 04/11/25 1329   QUEtiapine (SEROquel) 25 mg tablet [id number] Yes TAKE 4 to 5  TABLETS BY MOUTH AT BEDTIME AS NEEDED.   Patient taking differently: 1 tablet (25 mg). TAKE 4 to 5  TABLETS BY MOUTH AT BEDTIME AS NEEDED.    Geena Hunter MD  Active     Discontinued 04/11/25 1331     Discontinued 04/11/25 1331    Discontinued 04/11/25 1332   thiamine (Vitamin B-1) 100 mg tablet [id number] Yes Take 1 tablet (100 mg) by mouth once daily. HUMPHREY Martin  Active                    RX Allergies[2]    Social History     Socioeconomic History    Marital status:      Spouse name: Not on file    Number of children: Not on file    Years of education: Not on file    Highest education level: Not on file   Occupational History    Not on file   Tobacco Use    Smoking status: Some Days     Current packs/day: 1.00     Average packs/day: 1 pack/day for 10.0 years (10.0 ttl pk-yrs)     Types: Cigarettes     Passive exposure: Past    Smokeless tobacco: Never   Vaping Use    Vaping status: Never Used   Substance and Sexual  Activity    Alcohol use: Yes     Alcohol/week: 12.0 standard drinks of alcohol     Types: 12 Cans of beer per week    Drug use: Not Currently    Sexual activity: Not on file   Other Topics Concern    Not on file   Social History Narrative    Not on file     Social Drivers of Health     Financial Resource Strain: Patient Declined (3/1/2025)    Overall Financial Resource Strain (CARDIA)     Difficulty of Paying Living Expenses: Patient declined   Food Insecurity: Patient Declined (3/1/2025)    Hunger Vital Sign     Worried About Running Out of Food in the Last Year: Patient declined     Ran Out of Food in the Last Year: Patient declined   Transportation Needs: No Transportation Needs (4/3/2025)    OASIS : Transportation     Lack of Transportation (Medical): No     Lack of Transportation (Non-Medical): No     Patient Unable or Declines to Respond: No   Physical Activity: Not on file   Stress: Not on file   Social Connections: Feeling Socially Integrated (4/3/2025)    OASIS : Social Isolation     Frequency of experiencing loneliness or isolation: Never   Intimate Partner Violence: Not At Risk (3/1/2025)    Humiliation, Afraid, Rape, and Kick questionnaire     Fear of Current or Ex-Partner: No     Emotionally Abused: No     Physically Abused: No     Sexually Abused: No   Housing Stability: Patient Declined (3/1/2025)    Housing Stability Vital Sign     Unable to Pay for Housing in the Last Year: Patient declined     Number of Times Moved in the Last Year: 0     Homeless in the Last Year: Patient declined       Surgical History[3]         Review of Systems   GENERAL: Negative for malaise, significant weight loss, fever  MUSCULOSKELETAL: see HPI  NEURO:  Negative      Exam  Left knee: The patient is well-healed lateral stab incisions and medial stab incisions with mature scar present.  Mild crepitance appreciated over the screws with flexion extension of the knee.  Patient able obtain full extension of the in  flexion greater than 110 degrees.  Neurovascular intact distally     Imaging  AP and lateral imaging of the left knee was obtained on the date of this exam to evaluate for progressive healing of tibial plateau fracture    Imaging demonstrates progressive radiographic healing of left lateral tibial plateau fracture.  No evidence of hardware compromise.  Baseline arthritic change appreciated about the left knee joint.         Assessment  Patient is 2 and half month status post surgical stabilization of left lateral tibial plateau fracture.     Plan  Patient doing very well at this point and has progressed in regards to radiographic healing and clinical healing.  Recommend weightbearing as tolerate activity.  Will initiate physical therapy to focus on strengthening and range of motion of the left knee.  Patient is to remain off work for the next 6 weeks.  Will follow-up in patient in 6 weeks for repeat assessment.              [1]   Past Medical History:  Diagnosis Date    Acquired hypothyroidism 10/09/2015    Anxiety     Chondromalacia of right patella     Chronic prescription benzodiazepine use     Erectile dysfunction     Esophageal varices without bleeding, unspecified esophageal varices type (Multi) 05/23/2024    GERD (gastroesophageal reflux disease)     GERD without esophagitis 12/12/2016    Glaucoma     H/O esophageal varices     History of left knee replacement     Hypercalcemia     Hyperlipidemia     Hyperparathyroidism (Multi) 05/23/2024    Hypothyroidism     Insomnia     Left anterior cruciate ligament tear 02/12/2025    Liver cirrhosis (Multi)     Recurrent genital herpes 10/25/2013    Rupture of anterior cruciate ligament of left knee, initial encounter     Tear of meniscus of left knee     Vitamin D deficiency 01/07/2016   [2] No Known Allergies  [3]   Past Surgical History:  Procedure Laterality Date    CATARACT EXTRACTION      IRIDOTOMY / IRIDECTOMY      SHOULDER SURGERY

## 2025-05-19 NOTE — PROGRESS NOTES
"Patient Name: Sp Morrison  MRN: [id number]  Today's Date: 5/20/2025  Time Calculation  Start Time: 1415  Stop Time: 1458  Time Calculation (min): 43 min    Referring Diagnosis:  Problem List Items Addressed This Visit           ICD-10-CM    Difficulty walking - Primary R26.2    Muscle weakness of lower extremity M62.81     Other Visit Diagnoses         Codes      Closed fracture of lateral portion of left tibial plateau with routine healing     S82.122D            Insurance:  1/20  NEED NOTE ASAP  *2025 // PA REQ // 0% coins, no ded, $1600 oop ($0 met), $25 copay, 30v PT/OT [place] yr (0v used as of 5/19/2025), PA req per \[facility]\"" (ref# [id number]).   Traditional  Obtain PA thru [employer] (fax form)  [id number] - [phone] covered  POC: 5.19.25 - 8.17.25  Visit Number # 1    Precautions:  Precautions  Precautions Comment: mod fall risk    Subjective:  Referred by: Dr. Whitt, S82.122D (ICD-10-CM) - Closed fracture of lateral portion of left tibial plateau with routine healing   Date of Injury 2.27.25 - fell on steps  Referral date 5.16.25  Patient is 2 and half month status post surgical stabilization of left lateral tibial plateau fracture 3.6.25    PER MD VISIT ON 5.16.25 - \"Patient doing very well at this point and has progressed in regards to radiographic healing and clinical healing. Recommend weightbearing as tolerate activity. Will initiate physical therapy to focus on strengthening and range of motion of the left knee. Patient is to remain off work for the next 6 weeks. Will follow-up in patient in 6 weeks for repeat assessment\".   Pain:  Pain Assessment: 0-10  0-10 (Numeric) Pain Score: 5 - Moderate pain  Pain Type: Surgical pain  Pain Location: Knee  Pain Orientation: Left   Location: L knee   Description: some with walking   Aggravating Factors: walking increases pain and stairs.    Relieving Factors:  resting    Diagnostics see chart  Meds advil tylenol PRN   Previous Treatments - rehab, [facility]  PMHx-  Reviewed " "with patient and chart - states R knee has increased OA   Home environment multilevel home in [facility].  Currently staying sister in a 1 floor home.    Occupation  for  - currently off work due to injury - possible RTW 7.2.25  Hobbies fish     Functional limitations   Walking must use a fww  - 500'  Standing 5 min  Sitting no limits     PLOF - indep with all ADL, IADL, stairs, walking without a device      Patients goal: Increase knee strength and get back to work.      Objective:  (p! indicates pain)  Posture:  mild trunk flexion and forward head and shoulders.   High riding patella on the L    Gait/Stairs:  amb with fww with a fast continuous step through gait with slight decrease in L knee flexion during swing through.  Mild antalgia.  Stairs Margarette with railing and step to gait pattern due to apprehension and unsteadiness.   Bed Mobility:  indep - reports some discomfort when pressure is placed on his knee  Transfers:  indep with use of UE  Sit to Stand 18\" high 5 x times in:  26 sec  with UE support     AROM  KNEE  Extension R/L:  0, 0  Flexion R/L:  109, 130  HIP  Flexion R/L:   WFL  IR R/L:  WFL  ER R/L:  WFL  AB R/L:  WFL     Myotomes/Strength: #/5  L2: Hip flexion R/L:  5, 4  L3: Knee extension R/L:  5, 4  L4: Ankle dorsiflexion, inversion R/L:  5, 5  L5: Great toe extension R/L: nt  S1: Ankle plantarflexion and eversion R/L: WB NT  S2: Knee flexion R/L:  5, 4  Hip ABD R/L:  seated 5, 4-  Hip ext R/L:  seated 4+, 4-  Hip IR R/L:  nt  Hip ER R/L:  nt    Palpation:  mild tenderness along medial and infrapatellar pole.    Special Tests:  na    Joint mobility:  patellar mobility WNL    Flexibility  Hamstring 90/90 position R/L:  NT, -20  Jaquan test R/L:  nt    Dermatomes LE:  intermittent numbness L patella       SLS - flat level surface  R/L:  4 sec, 1 sec  Outcome Measure:  Other Measures  5x Sit to Stand: 26  Lower Extremity Funtional Score (LEFS): 19     Treatment:  Surgery 3.6.25  PT low complexity " eval: 20/1  Ther ex/patient ed/HEP instruction 95422: 23/2  The patient was educated on: the importance of positioning, proper posture, and body mechanics, joint mechanics and pathology, general tissue healing time, the appropriate use of heat and cold to control pain and inflammation, the importance of general therapeutic exercise, especially to stay within pain-free ROM, specific anatomy, function, & regional interdependence of involved areas, & likely cause of impairments & POC. Pt's questions were answered to their satisfaction, & pt. verbalized understanding & agreement with POC.  HEP  Access Code: [email or web address]  URL: [email or web address]  Date: 05/20/2025  Prepared by: Marina Jeter    Exercises  - Seated Long Arc Quad  - 2 x daily - 7 x weekly - 1 sets - 10 reps  - Active Straight Leg Raise with Quad Set  - 2 x daily - 7 x weekly - 1 sets - 10 reps  - Supine Heel Slide  - 2 x daily - 7 x weekly - 1 sets - 10 reps    Instructed in use of spc - to be initiated NV - advised to cont with fww at this time until next appointment when further education can be provided in use of spc and safety can be assessed.     Assessment:  Mr. Morrison's clinical presentation includes characteristics as noted during today's evaluation consistent with needing skilled physical therapy for s/p L surgical repair of a tibial plateau fracture and a PT diagnosis of L LE weakness and difficulty walking.  Patient presents with deficits including a decrease in postural awareness, strength, ROM and an increase in STR which is increasing patient's pain and limiting their ability to return to PLOF. Clinical findings indicate L LE weakness and balance deficits which result in the need for patient to use a fww and increase his risk of falling requiring the need for skilled PT services.   These findings indicate that this patient is of low complexity, and skilled PT services are warranted in order to realize measurable and  meaningful change in the above outcome measures and achieve improvements in the patient's functional status and individual goals.     Plan:  PT Plan: Skilled PT  PT Frequency: 2 times per week  Onset Date: 02/27/25  Certification Period Start Date: 05/20/25  Certification Period End Date: 08/18/25  Number of Treatments Authorized: 1  Rehab Potential: Good  Plan of Care Agreement: Patient  Planned Interventions include: therapeutic exercise, self-care home management, manual therapy, therapeutic activities, gait training, neuromuscular coordination, vasopneumatic, dry needling, aquatic therapy      Goals:  Goals: To be achieved in 20 visits    Patient will verbalize a decrease in pain L knee to <=1/10 in order to get into and out of a car with less pain as needed for his return to work    Patient will improve flexibility, joint mobility, and AROM in L knee  to 0-120 to improve ability to negotiate stairs     Patient will increase strength of  L lower extremity  to at least 4+/5 allow the patient to perform reciprocal stair climbing at least 2 flights as required for him to work    Patient to improve balance to be able to SLS L to at least 15 sec  to decrease risk for falls    Patient will improve LEFS outcome measure score to  at least 45/80 to demonstrate an overall improvement in function    Patient will improve sit to stand functional test to complete 5 reps at 18 in < 12 sec without UE support    Patient will improve ambulation to be able to tolerate walking approx 30 min without a device without deviations    Patient will correctly perform HEP without cues to maintain progress and overall functional status and patient will be independent with strategies designed to manage symptoms     Patient's LTG -  Patients goal: Increase knee strength and get back to work.      The patient and/or caregiver was involved in the creation of PT goals and patient agrees with prognosis, goals, and need to actively participate in  the POC.

## 2025-05-20 ENCOUNTER — EVALUATION (OUTPATIENT)
Dept: PHYSICAL THERAPY | Facility: CLINIC | Age: 60
End: 2025-05-20
Payer: COMMERCIAL

## 2025-05-20 DIAGNOSIS — S82.122D CLOSED FRACTURE OF LATERAL PORTION OF LEFT TIBIAL PLATEAU WITH ROUTINE HEALING: ICD-10-CM

## 2025-05-20 DIAGNOSIS — M62.81 MUSCLE WEAKNESS OF LOWER EXTREMITY: ICD-10-CM

## 2025-05-20 DIAGNOSIS — R26.2 DIFFICULTY WALKING: Primary | ICD-10-CM

## 2025-05-20 PROCEDURE — 97161 PT EVAL LOW COMPLEX 20 MIN: CPT | Mod: GP | Performed by: PHYSICAL THERAPIST

## 2025-05-20 PROCEDURE — 97110 THERAPEUTIC EXERCISES: CPT | Mod: GP | Performed by: PHYSICAL THERAPIST

## 2025-05-20 ASSESSMENT — PAIN SCALES - GENERAL: PAINLEVEL_OUTOF10: 5 - MODERATE PAIN

## 2025-05-20 ASSESSMENT — PAIN - FUNCTIONAL ASSESSMENT: PAIN_FUNCTIONAL_ASSESSMENT: 0-10

## 2025-05-20 NOTE — PROGRESS NOTES
"Patient Name: Sp Morrison  MRN: [id number]  Today's Date: 5/23/2025  Time Calculation  Start Time: 1311  Stop Time: 1400  Time Calculation (min): 49 min     Diagnosis:  Problem List Items Addressed This Visit           ICD-10-CM    Difficulty walking - Primary R26.2    Muscle weakness of lower extremity M62.81       Insurance:  2/20  NEED NOTE ASAP  *2025 // PA REQ // 0% coins, no ded, $1600 oop ($0 met), $25 copay, 30v PT/OT [place] yr (0v used as of 5/19/2025), [id number][facility]   Traditional  Obtain PA thru [employer] (fax form)  [phone] - [phone] covered  POC: 5.19.25 - 8.17.25  Visit # 2    Precautions:  Precautions  Precautions Comment: no new falls    Subjective:  Reports ongoing knee pain     Pain:  Pain Assessment: 0-10  0-10 (Numeric) Pain Score: 5 - Moderate pain  Pain Type: Surgical pain  Pain Location: Knee  Pain Orientation: Left    Objective:  QS - good  SLR indep with cues for QS  L knee supine flexion 115    Outcome Measure:  nt    Treatment:  There ex 92371: 31/2  Upright bike seat 7 x 3 min  manually timed   Stair HS/GS stretches 1 x :20  CR/TR foam pad x 10   Rocker board both direction  Seated LAQ 2 x 10   Seated HSC YTB x 10  SLR x 10  SL hip add x 10  SL hip abd x 10   Supine heel slides x 5    Gait training 68177: 18/1  Stair training 4\" step up/down x 10 each  Ambulation with spc  - instruction and information given re: type of spc to purchase.   Cleared to use spc at home with spc in R UE.    HEP/Education:  Access Code: [email or web address]  URL: [email or web address]  Date: 05/23/2025  Prepared by: Marina Jeter    Exercises  - Sidelying Hip Abduction  - 1 x daily - 7 x weekly - 1 sets - 10 reps  - Sidelying Hip Adduction  - 1 x daily - 7 x weekly - 1 sets - 10 reps    Assessment:  Patient tolerated today's session very well with noted fatigue.   Patient tends to be a bit impulsive at times and needs cues and guidance for safety.  Initiated WB balance activities " which significantly challenged patient due to his previous ankle injuries.  Patient issued instructions on purchasing a spc and how to adjust the height.   HEP updated this date with cues required for QS prior to leg raise.  Patient demonstrates ongoing skilled PT to cont with LE strength advancement as well as progressive gait and stair training to progress his functional status and RTW.    Patient demonstrates a working understanding of treatment plan and HEP requirements and how PT plan of care relates to PT and personal goal achievement    Plan:  Progress with functional strength as tolerated with respect to tissue healing. Manual therapy PRN to improve/maintain ROM, prevent adhesion, reduce pain.

## 2025-05-22 ENCOUNTER — APPOINTMENT (OUTPATIENT)
Dept: PHYSICAL THERAPY | Facility: CLINIC | Age: 60
End: 2025-05-22
Payer: COMMERCIAL

## 2025-05-23 ENCOUNTER — TREATMENT (OUTPATIENT)
Dept: PHYSICAL THERAPY | Facility: CLINIC | Age: 60
End: 2025-05-23
Payer: COMMERCIAL

## 2025-05-23 DIAGNOSIS — M62.81 MUSCLE WEAKNESS OF LOWER EXTREMITY: ICD-10-CM

## 2025-05-23 DIAGNOSIS — R26.2 DIFFICULTY WALKING: Primary | ICD-10-CM

## 2025-05-23 PROCEDURE — 97110 THERAPEUTIC EXERCISES: CPT | Mod: GP | Performed by: PHYSICAL THERAPIST

## 2025-05-23 PROCEDURE — 97116 GAIT TRAINING THERAPY: CPT | Mod: GP | Performed by: PHYSICAL THERAPIST

## 2025-05-23 ASSESSMENT — PAIN - FUNCTIONAL ASSESSMENT: PAIN_FUNCTIONAL_ASSESSMENT: 0-10

## 2025-05-23 ASSESSMENT — PAIN SCALES - GENERAL: PAINLEVEL_OUTOF10: 5 - MODERATE PAIN

## 2025-05-27 ENCOUNTER — TREATMENT (OUTPATIENT)
Dept: PHYSICAL THERAPY | Facility: CLINIC | Age: 60
End: 2025-05-27
Payer: COMMERCIAL

## 2025-05-27 DIAGNOSIS — R26.2 DIFFICULTY WALKING: Primary | ICD-10-CM

## 2025-05-27 DIAGNOSIS — S82.122D CLOSED FRACTURE OF LATERAL PORTION OF LEFT TIBIAL PLATEAU WITH ROUTINE HEALING: ICD-10-CM

## 2025-05-27 DIAGNOSIS — M62.81 MUSCLE WEAKNESS OF LOWER EXTREMITY: ICD-10-CM

## 2025-05-27 PROCEDURE — 97110 THERAPEUTIC EXERCISES: CPT | Mod: GP | Performed by: PHYSICAL THERAPIST

## 2025-05-27 PROCEDURE — 97140 MANUAL THERAPY 1/> REGIONS: CPT | Mod: GP | Performed by: PHYSICAL THERAPIST

## 2025-05-27 ASSESSMENT — PAIN SCALES - GENERAL: PAINLEVEL_OUTOF10: 0 - NO PAIN

## 2025-05-27 ASSESSMENT — PAIN - FUNCTIONAL ASSESSMENT: PAIN_FUNCTIONAL_ASSESSMENT: 0-10

## 2025-05-27 NOTE — PROGRESS NOTES
"Patient Name: Sp Morrison  MRN: [id number]  Today's Date: 5/30/2025        Diagnosis:  Problem List Items Addressed This Visit    None      Insurance:  3/20  *2025 // PA REQ // 0% coins, no ded, $1600 oop ($0 met), $25 copay, 30v PT/OT [place] yr (0v used as of 5/19/2025), PA req per [employer] (ref# [id number]).   Traditional  Obtain PA thru [employer] (fax form)  [phone] - [phone] covered  POC: 5.19.25 - 8.17.25  Visit # 4    Precautions:       Subjective:  ***    Pain:      Location: ***   Description: ***   Aggravating Factors: {Aggravating Factors:53687}   Relieving Factors:  {Relieving Factors:89712}    Objective:  ***    Outcome Measure:  {PT Outcome Measures:19533}     Treatment:  There ex 22582: 31/2  Recumb bike L1 5 min.  Stair HS/GS/soleus stretches 1 x :20  CR/TR foam pad x 10   Step up F/L, 4\" 10x each  Seated LAQ 2# 2 x 10   Seated HSC RTB 2 x 10  Hip flexor/quad stretch off EOB, :30 2x     Not performed today:  Upright bike seat 7 x 3 min  manually timed   Rocker board both direction  SLR x 10  SL hip add x 10  SL hip abd x 10   Supine heel slides x 5     Manual Ther Tech 30195: 9/1  Gr 2-3 sup<->inf patellar glides  DTM to distal quad, STM to mid quad     Gait training 00322:     HEP/Education:  ***    Assessment:  Patient tolerated today's session *** Patient demonstrates ***.    Patient demonstrates a working understanding of treatment plan and HEP requirements and how PT plan of care relates to PT and personal goal achievement    Plan:  Progress with functional strength as tolerated with respect to tissue healing. Manual therapy PRN to improve/maintain ROM, prevent adhesion, reduce pain.                    "

## 2025-05-27 NOTE — PROGRESS NOTES
"Patient Name: Sp Morrison  MRN: [id number]  Today's Date: 5/27/2025  Time Calculation  Start Time: 0923  Stop Time: 1008  Time Calculation (min): 45 min     Diagnosis:  Problem List Items Addressed This Visit           ICD-10-CM    Difficulty walking - Primary R26.2       Insurance:  3/20  NEED NOTE ASAP  *2025 // PA REQ // 0% coins, no ded, $1600 oop ($0 met), $25 copay, 30v PT/OT [place] yr (0v used as of 5/19/2025), PA req per [employer] (ref# [id number]).   Traditional  Obtain PA thru [employer] (fax form)  [phone] - [phone] covered  POC: 5.19.25 - 8.17.25  Visit # 3    Precautions:  Precautions  Precautions Comment: no falls since last visit  Subjective:  Pt denies difficulty over the weekend with regards to the knee. Just stiffness in the AM when first getting up. He admits to poor HEP compliance.    Pain:  Pain Assessment: 0-10  0-10 (Numeric) Pain Score: 0 - No pain  Objective:  QS - good  SLR indep with cues for QS  L knee supine flexion 115    5-: L knee supine flex AROM= 116*. Supine knee flexion off EOB= ~95*    Outcome Measure:  nt    Treatment:  There ex 55429: 31/2  Recumb bike L1 5 min.  Stair HS/GS/soleus stretches 1 x :20  CR/TR foam pad x 10   Step up F/L, 4\" 10x each  Seated LAQ 2# 2 x 10   Seated HSC RTB 2 x 10  Hip flexor/quad stretch off EOB, :30 2x    Not performed today:  Upright bike seat 7 x 3 min  manually timed   Rocker board both direction  SLR x 10  SL hip add x 10  SL hip abd x 10   Supine heel slides x 5    Manual Ther Tech 39513: 9/1  Gr 2-3 sup<->inf patellar glides  DTM to distal quad, STM to mid quad      Gait training 64693: ---  Not performed today:  Ambulation with spc  - instruction and information given re: type of spc to purchase.   Cleared to use spc at home with spc in R UE.    HEP/Education:  Access Code: [email or web address]  URL: [email or web address]  Date: 05/23/2025  Prepared by: Marina Jeter    Exercises  - Sidelying Hip Abduction  - 1 x daily - 7 x " weekly - 1 sets - 10 reps  - Sidelying Hip Adduction  - 1 x daily - 7 x weekly - 1 sets - 10 reps    Assessment:  Pt was able to get through program with progressions without inc pain, though he did not inc effort required. Noted signif less knee flexion when quad was involved vs just knee joint. Advised pt to be more compliant with HEP to help facilitate improved tissue extensibility.    Plan:  Progress with functional strength as tolerated with respect to tissue healing. Manual therapy PRN to improve/maintain ROM, prevent adhesion, reduce pain.

## 2025-05-29 DIAGNOSIS — E21.3 HYPERPARATHYROIDISM (MULTI): ICD-10-CM

## 2025-05-29 RX ORDER — OMEPRAZOLE 20 MG/1
20 CAPSULE, DELAYED RELEASE ORAL DAILY
Qty: 90 CAPSULE | Refills: 3 | Status: SHIPPED | OUTPATIENT
Start: 2025-05-29 | End: 2026-05-29

## 2025-05-30 ENCOUNTER — APPOINTMENT (OUTPATIENT)
Dept: PHYSICAL THERAPY | Facility: CLINIC | Age: 60
End: 2025-05-30
Payer: COMMERCIAL

## 2025-05-30 DIAGNOSIS — M62.81 MUSCLE WEAKNESS OF LOWER EXTREMITY: Primary | ICD-10-CM

## 2025-05-30 DIAGNOSIS — R26.2 DIFFICULTY WALKING: ICD-10-CM

## 2025-05-30 DIAGNOSIS — S82.122D CLOSED FRACTURE OF LATERAL PORTION OF LEFT TIBIAL PLATEAU WITH ROUTINE HEALING: ICD-10-CM

## 2025-06-02 ENCOUNTER — TREATMENT (OUTPATIENT)
Dept: PHYSICAL THERAPY | Facility: CLINIC | Age: 60
End: 2025-06-02
Payer: COMMERCIAL

## 2025-06-02 DIAGNOSIS — S82.122D CLOSED FRACTURE OF LATERAL PORTION OF LEFT TIBIAL PLATEAU WITH ROUTINE HEALING: ICD-10-CM

## 2025-06-02 DIAGNOSIS — R26.2 DIFFICULTY WALKING: Primary | ICD-10-CM

## 2025-06-02 DIAGNOSIS — M62.81 MUSCLE WEAKNESS OF LOWER EXTREMITY: ICD-10-CM

## 2025-06-02 PROCEDURE — 97140 MANUAL THERAPY 1/> REGIONS: CPT | Mod: GP | Performed by: PHYSICAL THERAPIST

## 2025-06-02 PROCEDURE — 97110 THERAPEUTIC EXERCISES: CPT | Mod: GP | Performed by: PHYSICAL THERAPIST

## 2025-06-02 NOTE — PROGRESS NOTES
"Patient Name: Sp Morrison  MRN: [id number]  Today's Date: 6/2/2025  Time Calculation  Start Time: 0940  Stop Time: 1030  Time Calculation (min): 50 min     Diagnosis:  Problem List Items Addressed This Visit           ICD-10-CM    Difficulty walking - Primary R26.2    Muscle weakness of lower extremity M62.81     Other Visit Diagnoses         Codes      Closed fracture of lateral portion of left tibial plateau with routine healing     S82.122D            Insurance:  4/20 *2025 // PA REQ // 0% coins, no ded, $1600 oop ($0 met), $25 copay, 30v PT/OT [place] yr (0v used as of 5/19/2025), PA req per [employer] (ref# [id number]).   Traditional  Obtain PA thru [facility] (fax form)  [phone] - [phone] covered  POC: 5.19.25 - 8.17.25  Visit # 4    Precautions:     Subjective:  Pt reports that he has some soreness in the L lower leg, and c/o swelling that is making his shoe tight on that side.     Pain:     Objective:  L knee flexion AROM in supine= 113*  Distal BLEs, L>R presented with cracked porcelain appearance, with red cracks.     Treatment:  Ther ex 48672: 33/2  Recumb bike L1 5 min.  Stair HS/GS/soleus stretches 1 x :30  Step up F/L, 6\" 10x each  Hip flexor/quad stretch off EOB, :30 2x  TR foam pad x 10   Seated HSC GTB 2 x 10  SLR x 10  Supine hip abduct stretch (cross midline) :30 1x    Not performed today:  Seated LAQ 2# 2 x 10   Upright bike seat 7 x 3 min  manually timed   Rocker board both direction  SL hip add x 10  SL hip abd x 10   Supine heel slides x 5     Manual Ther Tech 22425: 10/1  *Incl msmts  Gr 2-3 sup<->inf patellar glides  DTM to distal quad, STM to mid quad     Gait training 98444: ---    HEP/Education:    Assessment:  Pt has audible and palpable clicking in the L patella when going from bent to straight, consistent with laterally tracking patella. This is usually due to weakness in quad and excess tension in ITBand from TFL compensation. He was edu'd on this and would benefit from add'l quad " strengthening to cont to improve knee stability and reduce TFL compensation.     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 If it is still irritated and affected with swelling,     Plan:  Progress with functional strength as tolerated with respect to tissue healing. Manual therapy PRN to improve/maintain ROM, prevent adhesion, reduce pain.

## 2025-06-03 ENCOUNTER — APPOINTMENT (OUTPATIENT)
Dept: BEHAVIORAL HEALTH | Facility: CLINIC | Age: 60
End: 2025-06-03
Payer: COMMERCIAL

## 2025-06-03 DIAGNOSIS — F41.9 ANXIETY: ICD-10-CM

## 2025-06-03 DIAGNOSIS — G47.00 INSOMNIA, UNSPECIFIED TYPE: ICD-10-CM

## 2025-06-03 PROCEDURE — 99214 OFFICE O/P EST MOD 30 MIN: CPT | Performed by: PSYCHIATRY & NEUROLOGY

## 2025-06-03 NOTE — PROGRESS NOTES
Subjective   Patient ID: Sp Morrison is a 60 y.o. male.    HPI  Diagnoses of Anxiety and Insomnia, unspecified type were pertinent to this visit.    Current Outpatient Medications   Medication Instructions    acyclovir (Zovirax) 400 mg tablet Take 1 tablet twice daily as needed for suppression.    ALPRAZolam (XANAX) 0.5 mg, oral, Nightly    cholecalciferol (VITAMIN D3) 25 mcg, oral, Daily    cinacalcet (SENSIPAR) 30 mg, oral, Daily    folic acid (FOLVITE) 1 mg, oral, Daily    latanoprost (Xalatan) 0.005 % ophthalmic solution INSTILL 1 DROP INTO BOTH EYES ONCE DAILY IN THE EVENING AT BEDTIME    levothyroxine (SYNTHROID, LEVOXYL) 150 mcg, oral, Daily    omeprazole (PRILOSEC) 20 mg, oral, Daily    oxyCODONE (ROXICODONE) 5 mg, oral, Every 4 hours PRN    polyethylene glycol (GLYCOLAX, MIRALAX) 17 g, oral, Daily    QUEtiapine (SEROquel) 25 mg tablet TAKE 4 to 5  TABLETS BY MOUTH AT BEDTIME AS NEEDED.     +biopsychosocial stressors--- s/p injury with subsequent rehab and multiple recent transitions during recovery process;  Presently back at home and regaining some independence;  Denies exacerbation of anxiety/mood/insomnia symptoms;    Review of Systems   Psychiatric/Behavioral:  Negative for dysphoric mood, self-injury and suicidal ideas. The patient is not nervous/anxious.        Objective   Physical Exam  Psychiatric:         Attention and Perception: Perception normal.         Behavior: Behavior is cooperative.         Thought Content: Thought content does not include homicidal or suicidal ideation.     Mood/affect: improving following recent stress  BP: 120/74  as of 3/26/2025   Heart Rate: 77  as of 3/26/2025      Latest Reference Range & Units 04/14/25 14:21   ABSOLUTE NEUTROPHILS 1,500 - 7,800 cells/uL 3,051   ABSOLUTE LYMPHOCYTES 850 - 3,900 cells/uL 1,674   ABSOLUTE MONOCYTES 200 - 950 cells/uL 513   ABSOLUTE EOSINOPHILS 15 - 500 cells/uL 103   ABSOLUTE BASOPHILS 0 - 200 cells/uL 59   NEUTROPHILS % 56.5    LYMPHOCYTES % 31.0   MONOCYTES % 9.5   EOSINOPHILS % 1.9   BASOPHILS % 1.1      Latest Reference Range & Units 25 14:21   WHITE BLOOD CELL COUNT 3.8 - 10.8 Thousand/uL 5.4   RED BLOOD CELL COUNT 4.20 - 5.80 Million/uL 4.56   HEMOGLOBIN 13.2 - 17.1 g/dL 14.4   HEMATOCRIT 38.5 - 50.0 % 44.1   MCV 80.0 - 100.0 fL 96.7   MCH 27.0 - 33.0 pg 31.6   MCHC 32.0 - 36.0 g/dL 32.7   RDW 11.0 - 15.0 % 13.1   PLATELET COUNT 140 - 400 Thousand/uL 273   MPV 7.5 - 12.5 fL 9.7      Latest Reference Range & Units 25 14:21   GLUCOSE 65 - 139 mg/dL 114   SODIUM 135 - 146 mmol/L 139   POTASSIUM 3.5 - 5.3 mmol/L 4.8   CHLORIDE 98 - 110 mmol/L 105   CARBON DIOXIDE 20 - 32 mmol/L 26   ELECTROLYTE BALANCE 7 - 17 mmol/L (calc) 8   UREA NITROGEN (BUN) 7 - 25 mg/dL 15   CREATININE 0.70 - 1.35 mg/dL 0.95   EGFR > OR = 60 mL/min/1.73m2 91   CALCIUM 8.6 - 10.3 mg/dL 11.4 (H)   PHOSPHATE (AS PHOSPHORUS) 2.5 - 4.5 mg/dL 4.0   ALBUMIN 3.6 - 5.1 g/dL 4.5   PROTEIN, TOTAL 6.1 - 8.1 g/dL 7.4   ALKALINE PHOSPHATASE 35 - 144 U/L 81   ALT 9 - 46 U/L 18   AST 10 - 35 U/L 14   BILIRUBIN, TOTAL 0.2 - 1.2 mg/dL 0.4   PARATHYROID HORMONE, INTACT 16 - 77 pg/mL 18   TSH 0.40 - 4.50 mIU/L 1.09   (H): Data is abnormally high  Assessment/Plan   EK25  Sinus tachycardia HR: 111 bpm  Xhd=212 ms  Diagnoses and all orders for this visit:  Anxiety  -     Follow Up In Psychiatry  -     Follow Up In Psychiatry; Future  Insomnia, unspecified type  -     ALPRAZolam (Xanax) 0.5 mg tablet; Take 1 tablet (0.5 mg) by mouth once daily at bedtime.  -     QUEtiapine (SEROquel) 25 mg tablet; TAKE 4 to 5  TABLETS BY MOUTH AT BEDTIME AS NEEDED.

## 2025-06-05 ENCOUNTER — TREATMENT (OUTPATIENT)
Dept: PHYSICAL THERAPY | Facility: CLINIC | Age: 60
End: 2025-06-05
Payer: COMMERCIAL

## 2025-06-05 DIAGNOSIS — M62.81 MUSCLE WEAKNESS OF LOWER EXTREMITY: ICD-10-CM

## 2025-06-05 DIAGNOSIS — S82.122D CLOSED FRACTURE OF LATERAL PORTION OF LEFT TIBIAL PLATEAU WITH ROUTINE HEALING: ICD-10-CM

## 2025-06-05 DIAGNOSIS — R26.2 DIFFICULTY WALKING: Primary | ICD-10-CM

## 2025-06-05 PROCEDURE — 97140 MANUAL THERAPY 1/> REGIONS: CPT | Mod: GP,CQ

## 2025-06-05 PROCEDURE — 97110 THERAPEUTIC EXERCISES: CPT | Mod: GP,CQ

## 2025-06-05 NOTE — PROGRESS NOTES
"Patient Name: Sp Morrison  MRN: [id number]  Today's Date: 6/5/2025  Time Calculation  Start Time: 1115  Stop Time: 1158  Time Calculation (min): 43 min  Insurance:  5/20 *2025 // PA REQ // 0% coins, no ded, $1600 oop ($0 met), $25 copay, 30v PT/OT [place] yr (0v used as of 5/19/2025), PA req per [employer] (ref# [id number]).   Traditional  Obtain PA thru [facility] (fax form)  [phone] - [phone] covered  POC: 5.19.25 - 8.17.25  Visit # 5     Subjective:  Reports that he continues to have L knee pain . Describes it as \"screws grinding together \" State that he continues to have ankle swelling.     Objective:  AROM L Knee flexion 115   Discussed/educated in LE swelling management strategies as well as  use of compression stocking to aide with swelling   Assessment:   Pain limited ability to  improve end range . Attempted Standing Cable column TKE however reports increased R knee pain due to previous medical knee issues as well. Swelling noted to knee . Ktape trial applied with instruction on proper use /doffing .    Plan:   Progress as able .     Treatment :   Ther ex 95425: 33/2  Recumb bike L1 5 min.  Stair HS/GS/soleus stretches 1 x :30  Step up F/L, 6\" 10x each  Hip flexor/quad stretch off EOB, :30 2x  TR foam pad x 10   Seated HSC GTB 2 x 10  SLR x 10  Supine hip abduct stretch (cross midline) :30 1x   TKE 12.5 # unable increased pain     Not performed today:  Seated LAQ 2# 2 x 10   Upright bike seat 7 x 3 min  manually timed   Rocker board both direction  SL hip add x 10  SL hip abd x 10   Supine heel slides x 5     Manual Ther Tech 39446: 10/1  Gr 1-2- sup<->inf patellar glides  IASTM /DTM to distal quad, STM to mid quad  K tape \"C\" L patella           Current Problem:  1. Difficulty walking                Pain:  6-7/10   Location: L knee at joint line           Precautions: No recent falls     "

## 2025-06-07 DIAGNOSIS — H40.213 ACUTE PRIMARY ANGLE-CLOSURE GLAUCOMA OF BOTH EYES: ICD-10-CM

## 2025-06-08 RX ORDER — ALPRAZOLAM 0.5 MG/1
0.5 TABLET ORAL NIGHTLY
Qty: 30 TABLET | Refills: 2 | Status: SHIPPED | OUTPATIENT
Start: 2025-06-08

## 2025-06-08 RX ORDER — QUETIAPINE FUMARATE 25 MG/1
TABLET, FILM COATED ORAL
Qty: 150 TABLET | Refills: 2 | Status: SHIPPED | OUTPATIENT
Start: 2025-06-08

## 2025-06-08 ASSESSMENT — ENCOUNTER SYMPTOMS
NERVOUS/ANXIOUS: 0
DYSPHORIC MOOD: 0

## 2025-06-09 DIAGNOSIS — H40.213 ACUTE PRIMARY ANGLE-CLOSURE GLAUCOMA OF BOTH EYES: ICD-10-CM

## 2025-06-09 DIAGNOSIS — H40.1331 PIGMENTARY GLAUCOMA OF BOTH EYES, MILD STAGE: ICD-10-CM

## 2025-06-09 RX ORDER — LATANOPROST 50 UG/ML
1 SOLUTION/ DROPS OPHTHALMIC NIGHTLY
Qty: 2.5 ML | Refills: 3 | Status: SHIPPED | OUTPATIENT
Start: 2025-06-09

## 2025-06-09 RX ORDER — DORZOLAMIDE HYDROCHLORIDE AND TIMOLOL MALEATE 20; 5 MG/ML; MG/ML
1 SOLUTION/ DROPS OPHTHALMIC 2 TIMES DAILY
Qty: 10 ML | Refills: 11 | Status: SHIPPED | OUTPATIENT
Start: 2025-06-09 | End: 2026-06-09

## 2025-06-09 RX ORDER — LATANOPROST 50 UG/ML
SOLUTION/ DROPS OPHTHALMIC
Qty: 2.5 ML | Refills: 0 | Status: SHIPPED | OUTPATIENT
Start: 2025-06-09 | End: 2025-06-09 | Stop reason: SDUPTHER

## 2025-06-12 ENCOUNTER — TREATMENT (OUTPATIENT)
Dept: PHYSICAL THERAPY | Facility: CLINIC | Age: 60
End: 2025-06-12
Payer: COMMERCIAL

## 2025-06-12 DIAGNOSIS — S82.122D CLOSED FRACTURE OF LATERAL PORTION OF LEFT TIBIAL PLATEAU WITH ROUTINE HEALING: ICD-10-CM

## 2025-06-12 DIAGNOSIS — M62.81 MUSCLE WEAKNESS OF LOWER EXTREMITY: ICD-10-CM

## 2025-06-12 DIAGNOSIS — R26.2 DIFFICULTY WALKING: Primary | ICD-10-CM

## 2025-06-12 DIAGNOSIS — M25.562 LEFT KNEE PAIN: ICD-10-CM

## 2025-06-12 PROCEDURE — 97110 THERAPEUTIC EXERCISES: CPT | Mod: GP | Performed by: PHYSICAL THERAPIST

## 2025-06-12 NOTE — PROGRESS NOTES
"Patient Name: Sp Morrison  MRN: [id number]  Today's Date: 6/12/2025     Insurance:  6/20 *2025 // PA REQ // 0% coins, no ded, $1600 oop ($0 met), $25 copay, 30v PT/OT [place] yr (0v used as of 5/19/2025), PA req per [employer] (ref# [id number]).   Traditional  Obtain PA thru [facility] (fax form)  [phone] - [phone] covered  POC: 5.19.25 - 8.17.25  Visit # 6     Subjective:      Objective:  Signif crepitus in R knee noted with any WB knee flexion on that side.     Assessment:   Program shortened today due to R knee limiting all WB activity. Pt may benefit from     Plan:   Cont to progress as filemon with LE strengthening and knee mobilization.    Treatment :   Ther ex 03568: 33/2  Recumb bike L1 5 min.  Stair HS/GS/soleus stretches 1 x :30  Step up F/L, 6\" 10x each  Ham curl machine, (seat 3) 40# 2x10    Hip flexor/quad stretch off EOB, :30 2x  TR foam pad x 10     Add next visit:  SLR x 10  Supine hip abduct stretch (cross midline) :30 1x  SL hip abd x 10     Not performed today:  Seated LAQ 2# 2 x 10   Upright bike seat 7 x 3 min  manually timed   Rocker board both direction  SL hip add x 10    Supine heel slides x 5     Manual Ther Tech 05976: 10/1  Gr 1-2- sup<->inf patellar glides  IASTM /DTM to distal quad, STM to mid quad  K tape \"C\" L patella           Current Problem:  1. Difficulty walking  Follow Up In Physical Therapy      2. Muscle weakness of lower extremity  Follow Up In Physical Therapy      3. Closed fracture of lateral portion of left tibial plateau with routine healing  Follow Up In Physical Therapy              Pain:  6-7/10   Location: L knee at joint line           Precautions: No recent falls     "

## 2025-06-16 ENCOUNTER — APPOINTMENT (OUTPATIENT)
Dept: PHYSICAL THERAPY | Facility: CLINIC | Age: 60
End: 2025-06-16
Payer: COMMERCIAL

## 2025-06-16 NOTE — PROGRESS NOTES
"Patient Name: Sp Morrison  MRN: [id number]  Today's Date: 6/16/2025        Diagnosis:  Problem List Items Addressed This Visit    None      Insurance:  7/20 *2025 // PA REQ // 0% coins, no ded, $1600 oop ($0 met), $25 copay, 30v PT/OT [place] yr (0v used as of 5/19/2025), PA req per [employer] (ref# [id number]).   Traditional  Obtain PA thru [facility] (fax form)  [phone] - [phone] covered  POC: 5.19.25 - 8.17.25  Visit # 7    Precautions:       Subjective:  ***    Pain:      Location: ***   Description: ***   Aggravating Factors: {Aggravating Factors:37376}   Relieving Factors:  {Relieving Factors:20427}    Objective:  ***    Outcome Measure:  {PT Outcome Measures:25306}     Treatment:  Ther ex 42349: 33/2  Recumb bike L1 5 min.  Stair HS/GS/soleus stretches 1 x :30  Step up F/L, 6\" 10x each  Ham curl machine, (seat 3) 40# 2x10     Hip flexor/quad stretch off EOB, :30 2x  TR foam pad x 10      Add next visit:  SLR x 10  Supine hip abduct stretch (cross midline) :30 1x  SL hip abd x 10      Not performed today:  Seated LAQ 2# 2 x 10   Upright bike seat 7 x 3 min  manually timed   Rocker board both direction  SL hip add x 10     Supine heel slides x 5     Manual Ther Tech 64390: 10/1  Gr 1-2- sup<->inf patellar glides  IASTM /DTM to distal quad, STM to mid quad  K tape \"C\" L patella   HEP/Education:  ***    Assessment:  Patient tolerated today's session *** Patient demonstrates ***.    Patient demonstrates a working understanding of treatment plan and HEP requirements and how PT plan of care relates to PT and personal goal achievement    Plan:  Progress with functional strength as tolerated with respect to tissue healing. Manual therapy PRN to improve/maintain ROM, prevent adhesion, reduce pain.                    "

## 2025-06-17 NOTE — PROGRESS NOTES
"Patient Name: Sp Morrison  MRN: [id number]  Today's Date: 6/19/2025  Time Calculation  Start Time: 1646  Stop Time: 1730  Time Calculation (min): 44 min     Diagnosis:  Problem List Items Addressed This Visit           ICD-10-CM    Difficulty walking R26.2    Muscle weakness of lower extremity M62.81     Other Visit Diagnoses         Codes      Closed fracture of lateral portion of left tibial plateau with routine healing     S82.122D          Insurance:  7/20 *2025 // PA REQ // 0% coins, no ded, $1600 oop ($0 met), $25 copay, 30v PT/OT [place] yr (0v used as of 5/19/2025), PA req per [employer] (ref# [id number]).   Traditional  Obtain PA thru [facility] (fax form)  [phone] - [phone] covered  POC: 5.19.25 - 8.17.25  Visit # 7     Precautions:     Subjective:  Patient states he has so me soreness in his knee but really has pain in B feet.  Has follow up with MD 6.25 1:00 pm.  Patient has returned back to living at his home.  States he just put glow in the dark tape on his steps.   Unsure if KT tape was helpful, frustrated that tape adhesive remained.      Pain:   Location: B feet,  minor L knee pain.       Objective:  Slight LOB with the ability to self recover when standing and quickly looking up.    Removed left over adhesive on L knee.   Outcome Measure:  nt     Treatment:  Ther ex 19362: 44/3  Recumb bike L1 5 min.  Stair HS/GS/soleus stretches 1 x :30  Step up F/L, 6\" 10x each  Ham curl machine, (seat 3) 40# 2x10  Leg Press 4 holes showing, 40# x 15  Seated LAQ GTB x :60  Hip flexor/quad stretch off EOB, :30 2x  SL hip abd x 15  SLR x 15  Add next visit:  Supine hip abduct stretch (cross midline) :30 1x       Not performed today:  Upright bike seat 7 x 3 min  manually timed   Rocker board both direction  SL hip add x 10  Supine heel slides x 10  Manual Ther Tech 74054: 0/0  Gr 1-2- sup<->inf patellar glides  IASTM /DTM to distal quad, STM to mid quad  K tape \"C\" L patella   HEP/Education:  No changes    "   Assessment:  Patient tolerated today's session well.  He is progressing well with regard to his L knee pain and function.  He has returned to stairs but does report that he still has occasional pain and is still challenged at times with reciprocal gait descending.  Leg Press was added this date to cont to advance L LE strength.   Patient demonstrates ongoing skilled need as stairs are a necessary component of his job, he has a RTW date of 7.2.25 and he cont to demonstrate weakness and balance deficits.   Patient demonstrates a working understanding of treatment plan and HEP requirements and how PT plan of care relates to PT and personal goal achievement     Plan:  Progress with functional strength as tolerated with respect to tissue healing. Manual therapy PRN to improve/maintain ROM, prevent adhesion, reduce pain.

## 2025-06-19 ENCOUNTER — TREATMENT (OUTPATIENT)
Dept: PHYSICAL THERAPY | Facility: CLINIC | Age: 60
End: 2025-06-19
Payer: COMMERCIAL

## 2025-06-19 DIAGNOSIS — M62.81 MUSCLE WEAKNESS OF LOWER EXTREMITY: ICD-10-CM

## 2025-06-19 DIAGNOSIS — R26.2 DIFFICULTY WALKING: ICD-10-CM

## 2025-06-19 DIAGNOSIS — S82.122D CLOSED FRACTURE OF LATERAL PORTION OF LEFT TIBIAL PLATEAU WITH ROUTINE HEALING: ICD-10-CM

## 2025-06-19 DIAGNOSIS — E21.3 HYPERPARATHYROIDISM (MULTI): ICD-10-CM

## 2025-06-19 PROCEDURE — 97530 THERAPEUTIC ACTIVITIES: CPT | Mod: GP | Performed by: PHYSICAL THERAPIST

## 2025-06-19 RX ORDER — CINACALCET 30 MG/1
30 TABLET, FILM COATED ORAL DAILY
Qty: 90 TABLET | Refills: 0 | Status: SHIPPED | OUTPATIENT
Start: 2025-06-19

## 2025-06-19 ASSESSMENT — PAIN - FUNCTIONAL ASSESSMENT: PAIN_FUNCTIONAL_ASSESSMENT: 0-10

## 2025-06-20 NOTE — PROGRESS NOTES
"Patient Name: Sp Morrison  MRN: [id number]  Today's Date: 6/23/2025        Diagnosis:  Problem List Items Addressed This Visit           ICD-10-CM    Difficulty walking - Primary R26.2       Insurance:  8/20 *2025 // PA REQ // 0% coins, no ded, $1600 oop ($0 met), $25 copay, 30v PT/OT [place] yr (0v used as of 5/19/2025), PA req per [employer] (ref# [id number]).   Traditional  Obtain PA thru [facility] (fax form)  [phone] - [phone] covered  POC: 5.19.25 - 8.17.25  Visit # 8    Precautions:       Subjective:  ***    Pain:      Location: ***   Description: ***   Aggravating Factors: {Aggravating Factors:94102}   Relieving Factors:  {Relieving Factors:12438}    Objective:  ***    Outcome Measure:  {PT Outcome Measures:64920}     Treatment:  Ther ex 59671: 44/3  Recumb bike L1 5 min.  Stair HS/GS/soleus stretches 1 x :30  Step up F/L, 6\" 10x each  Ham curl machine, (seat 3) 40# 2x10  Leg Press 4 holes showing, 40# x 15  Seated LAQ GTB x :60  Hip flexor/quad stretch off EOB, :30 2x  SL hip abd x 15  SLR x 15  Add next visit:  Supine hip abduct stretch (cross midline) :30 1x        Not performed today:  Upright bike seat 7 x 3 min  manually timed   Rocker board both direction  SL hip add x 10  Supine heel slides x 10  Manual Ther Tech 67078: 0/0  Gr 1-2- sup<->inf patellar glides  IASTM /DTM to distal quad, STM to mid quad  K tape \"C\" L patella   HEP/Education:  No changes       Assessment:  Patient tolerated today's session *** Patient demonstrates ***.    Patient demonstrates a working understanding of treatment plan and HEP requirements and how PT plan of care relates to PT and personal goal achievement    Plan:  Progress with functional strength as tolerated with respect to tissue healing. Manual therapy PRN to improve/maintain ROM, prevent adhesion, reduce pain.                    "

## 2025-06-23 ENCOUNTER — APPOINTMENT (OUTPATIENT)
Dept: PHYSICAL THERAPY | Facility: CLINIC | Age: 60
End: 2025-06-23
Payer: COMMERCIAL

## 2025-06-23 DIAGNOSIS — R26.2 DIFFICULTY WALKING: Primary | ICD-10-CM

## 2025-06-24 NOTE — PROGRESS NOTES
"Patient Name: Sp Morrison  MRN: [id number]  Today's Date: 6/26/2025        Diagnosis:  Problem List Items Addressed This Visit           ICD-10-CM    Difficulty walking - Primary R26.2       Insurance:  8/20 *2025 // PA REQ // 0% coins, no ded, $1600 oop ($0 met), $25 copay, 30v PT/OT [place] yr (0v used as of 5/19/2025), PA req per [employer] (ref# [id number]).   Traditional  Obtain PA thru [facility] (fax form)  [phone] - [phone] covered  POC: 5.19.25 - 8.17.25  Visit # 8    Precautions:       Subjective:  ***    Pain:      Location: ***   Description: ***   Aggravating Factors: {Aggravating Factors:51379}   Relieving Factors:  {Relieving Factors:76556}    Objective:  ***    Outcome Measure:  {PT Outcome Measures:83976}     Treatment:  Ther ex 94646: 44/3  Recumb bike L1 5 min.  Stair HS/GS/soleus stretches 1 x :30  Step up F/L, 6\" 10x each  Ham curl machine, (seat 3) 40# 2x10  Leg Press 4 holes showing, 40# x 15  Seated LAQ GTB x :60  Hip flexor/quad stretch off EOB, :30 2x  SL hip abd x 15  SLR x 15  Add next visit:  Supine hip abduct stretch (cross midline) :30 1x        Not performed today:  Upright bike seat 7 x 3 min  manually timed   Rocker board both direction  SL hip add x 10  Supine heel slides x 10  Manual Ther Tech 88682: 0/0  Gr 1-2- sup<->inf patellar glides  IASTM /DTM to distal quad, STM to mid quad  K tape \"C\" L patella NOT today   HEP/Education:  No changes       Assessment:  Patient tolerated today's session *** Patient demonstrates ***.    Patient demonstrates a working understanding of treatment plan and HEP requirements and how PT plan of care relates to PT and personal goal achievement    Plan:  Progress with functional strength as tolerated with respect to tissue healing. Manual therapy PRN to improve/maintain ROM, prevent adhesion, reduce pain.                    "

## 2025-06-25 ENCOUNTER — APPOINTMENT (OUTPATIENT)
Dept: ORTHOPEDIC SURGERY | Facility: CLINIC | Age: 60
End: 2025-06-25
Payer: COMMERCIAL

## 2025-06-25 DIAGNOSIS — S82.122D CLOSED FRACTURE OF LATERAL PORTION OF LEFT TIBIAL PLATEAU WITH ROUTINE HEALING: Primary | ICD-10-CM

## 2025-06-25 PROCEDURE — 99213 OFFICE O/P EST LOW 20 MIN: CPT | Performed by: ORTHOPAEDIC SURGERY

## 2025-06-25 NOTE — LETTER
June 25, 2025     Patient: Sp Morrison   YOB: 1965   Date of Visit: 6/25/2025       To Whom It May Concern:    It is my medical opinion that Sp Morrison may return to work on 07/02/2025 and will remain light duty for two months following.    If you have any questions or concerns, please don't hesitate to call.         Sincerely,        Yeison Whitt MD    CC: No Recipients

## 2025-06-25 NOTE — PROGRESS NOTES
History of Present Illness  Chief Complaint   Patient presents with    Left Knee - Follow-up       Patient doing well overall.  Patient reports he is essentially 90% better.  Denies any new injuries or falls.  No longer using any assistive devices.    Medical History[1]    Medication Documentation Review Audit       Reviewed by Shirin Cast MA (Medical Assistant) on 06/25/25 at 1304      Medication Order Taking? Sig Documenting Provider Last Dose Status   acyclovir (Zovirax) 400 mg tablet [id number] No Take 1 tablet twice daily as needed for suppression. Reta Boston, APRBONNIE-CNP More than a month Active   ALPRAZolam (Xanax) 0.5 mg tablet [id number]  Take 1 tablet (0.5 mg) by mouth once daily at bedtime. Geena Hunter MD  Active   cholecalciferol (Vitamin D3) 25 mcg (1,000 units) tablet [id number]  Take 1 tablet (25 mcg) by mouth once daily. Historical Provider, MD  Active     Discontinued 06/19/25 1038   cinacalcet (Sensipar) 30 mg tablet [id number]  TAKE ONE TABLET BY MOUTH EVERY DAY Rafael PINEDA MD  Active   dorzolamide-timoloL (Cosopt) 22.3-6.8 mg/mL ophthalmic solution [id number]  Administer 1 drop into both eyes 2 times a day. Zach Tinajero MD  Active   folic acid (Folvite) 1 mg tablet [id number]  Take 1 tablet (1 mg) by mouth once daily. BERNARDINO Martin-CNP  Active   latanoprost (Xalatan) 0.005 % ophthalmic solution [id number]  Administer 1 drop into both eyes once daily at bedtime. Zach Tinajero MD  Active   levothyroxine (Synthroid, Levoxyl) 150 mcg tablet [id number]  Take 1 tablet (150 mcg) by mouth once daily. Rafael PINEDA MD  Active   omeprazole (PriLOSEC) 20 mg DR capsule [id number]  Take 1 capsule (20 mg) by mouth once daily. Rafael PINEDA MD  Active   oxyCODONE (Roxicodone) 5 mg immediate release tablet [id number]  Take 1 tablet (5 mg) by mouth every 4 hours if needed for moderate pain (4 - 6) or severe pain (7 - 10). Selin Ward, APRN-CNP  Active    polyethylene glycol (Glycolax, Miralax) 17 gram packet [id number]  Take 17 g by mouth once daily. Selin Ward, APRN-CNP  Active   QUEtiapine (SEROquel) 25 mg tablet [id number]  TAKE 4 to 5  TABLETS BY MOUTH AT BEDTIME AS NEEDED. Geena Hunter MD  Active                    RX Allergies[2]    Social History     Socioeconomic History    Marital status:      Spouse name: Not on file    Number of children: Not on file    Years of education: Not on file    Highest education level: Not on file   Occupational History    Not on file   Tobacco Use    Smoking status: Some Days     Current packs/day: 1.00     Average packs/day: 1 pack/day for 10.0 years (10.0 ttl pk-yrs)     Types: Cigarettes     Passive exposure: Past    Smokeless tobacco: Never   Vaping Use    Vaping status: Never Used   Substance and Sexual Activity    Alcohol use: Yes     Alcohol/week: 12.0 standard drinks of alcohol     Types: 12 Cans of beer per week    Drug use: Not Currently    Sexual activity: Not on file   Other Topics Concern    Not on file   Social History Narrative    Not on file     Social Drivers of Health     Financial Resource Strain: Patient Declined (3/1/2025)    Overall Financial Resource Strain (CARDIA)     Difficulty of Paying Living Expenses: Patient declined   Food Insecurity: Patient Declined (3/1/2025)    Hunger Vital Sign     Worried About Running Out of Food in the Last Year: Patient declined     Ran Out of Food in the Last Year: Patient declined   Transportation Needs: No Transportation Needs (4/3/2025)    OASIS : Transportation     Lack of Transportation (Medical): No     Lack of Transportation (Non-Medical): No     Patient Unable or Declines to Respond: No   Physical Activity: Not on file   Stress: Not on file   Social Connections: Feeling Socially Integrated (4/3/2025)    OASIS : Social Isolation     Frequency of experiencing loneliness or isolation: Never   Intimate Partner Violence: Not At Risk  (3/1/2025)    Humiliation, Afraid, Rape, and Kick questionnaire     Fear of Current or Ex-Partner: No     Emotionally Abused: No     Physically Abused: No     Sexually Abused: No   Housing Stability: Patient Declined (3/1/2025)    Housing Stability Vital Sign     Unable to Pay for Housing in the Last Year: Patient declined     Number of Times Moved in the Last Year: 0     Homeless in the Last Year: Patient declined       Surgical History[3]         Review of Systems   GENERAL: Negative for malaise, significant weight loss, fever  MUSCULOSKELETAL: see HPI  NEURO:  Negative      Exam  Left knee: Patient has full extension and flexion of the knee greater than 130 degrees.  Patient's knee is stable to stressing and nontender palpation about the hardware or surgical sites.     Imaging  AP and lateral imaging of the left knee was obtained on the day of this exam to evaluate for progressive healing of tibial plateau fracture    Imaging demonstrates complete radiographic healing of left tibial plateau fracture.  No evidence of hardware backout or failure.       Assessment  Healed left tibial plateau fracture with mild functional impairment     Plan  Patient doing very well overall and feel that he can return in a light-duty capacity on July 2, 2025 and then light duty at work for the next 2 months.  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              [1]   Past Medical History:  Diagnosis Date    Acquired hypothyroidism 10/09/2015    Anxiety     Chondromalacia of right patella     Chronic prescription benzodiazepine use     Erectile dysfunction     Esophageal varices without bleeding, unspecified esophageal varices type (Multi) 05/23/2024    GERD (gastroesophageal reflux disease)     GERD without esophagitis 12/12/2016    Glaucoma     H/O esophageal varices     History of left knee replacement     Hypercalcemia      Hyperlipidemia     Hyperparathyroidism (Multi) 05/23/2024    Hypothyroidism     Insomnia     Left anterior cruciate ligament tear 02/12/2025    Liver cirrhosis (Multi)     Recurrent genital herpes 10/25/2013    Rupture of anterior cruciate ligament of left knee, initial encounter     Tear of meniscus of left knee     Vitamin D deficiency 01/07/2016   [2] No Known Allergies  [3]   Past Surgical History:  Procedure Laterality Date    CATARACT EXTRACTION      IRIDOTOMY / IRIDECTOMY      SHOULDER SURGERY

## 2025-06-26 ENCOUNTER — APPOINTMENT (OUTPATIENT)
Dept: PHYSICAL THERAPY | Facility: CLINIC | Age: 60
End: 2025-06-26
Payer: COMMERCIAL

## 2025-06-26 DIAGNOSIS — R26.2 DIFFICULTY WALKING: Primary | ICD-10-CM

## 2025-06-27 NOTE — PROGRESS NOTES
"Patient Name: Sp Morrison  MRN: [id number]  Today's Date: 6/30/2025        Diagnosis:  Problem List Items Addressed This Visit           ICD-10-CM    Difficulty walking - Primary R26.2       Insurance:  8/20 *2025 // PA REQ // 0% coins, no ded, $1600 oop ($0 met), $25 copay, 30v PT/OT [place] yr (0v used as of 5/19/2025), PA req per [employer] (ref# [id number]).   Traditional  Obtain PA thru [employer] (fax form)  [id number] - [id number] covered  POC: 5.19.25 - 8.17.25  Visit # 8    Precautions:       Subjective:  ***    Pain:      Location: ***   Description: ***   Aggravating Factors: {Aggravating Factors:72259}   Relieving Factors:  {Relieving Factors:17040}    Objective:  ***    Outcome Measure:  {PT Outcome Measures:99826}     Treatment:  Ther ex 29978: 44/3  Recumb bike L1 5 min.  Stair HS/GS/soleus stretches 1 x :30  Step up F/L, 6\" 10x each  Ham curl machine, (seat 3) 40# 2x10  Leg Press 4 holes showing, 40# x 15  Seated LAQ GTB x :60  Hip flexor/quad stretch off EOB, :30 2x  SL hip abd x 15  SLR x 15  Add next visit:  Supine hip abduct stretch (cross midline) :30 1x        Not performed today:  Upright bike seat 7 x 3 min  manually timed   Rocker board both direction  SL hip add x 10  Supine heel slides x 10  Manual Ther Tech 66523: 0/0  Gr 1-2- sup<->inf patellar glides  IASTM /DTM to distal quad, STM to mid quad  K tape \"C\" L patella NOT today   HEP/Education:  No changes      Assessment:  Patient tolerated today's session *** Patient demonstrates ***.    Patient demonstrates a working understanding of treatment plan and HEP requirements and how PT plan of care relates to PT and personal goal achievement    Plan:  Progress with functional strength as tolerated with respect to tissue healing. Manual therapy PRN to improve/maintain ROM, prevent adhesion, reduce pain.       Goals:  Goals: To be achieved in 20 visits     Patient will verbalize a decrease in pain L knee to <=1/10 in order to get into and out of a car " with less pain as needed for his return to work     Patient will improve flexibility, joint mobility, and AROM in L knee  to 0-120 to improve ability to negotiate stairs      Patient will increase strength of  L lower extremity  to at least 4+/5 allow the patient to perform reciprocal stair climbing at least 2 flights as required for him to work     Patient to improve balance to be able to SLS L to at least 15 sec  to decrease risk for falls     Patient will improve LEFS outcome measure score to  at least 45/80 to demonstrate an overall improvement in function     Patient will improve sit to stand functional test to complete 5 reps at 18 in < 12 sec without UE support     Patient will improve ambulation to be able to tolerate walking approx 30 min without a device without deviations     Patient will correctly perform HEP without cues to maintain progress and overall functional status and patient will be independent with strategies designed to manage symptoms      Patient's LTG -  Patients goal: Increase knee strength and get back to work.

## 2025-06-30 ENCOUNTER — APPOINTMENT (OUTPATIENT)
Dept: PHYSICAL THERAPY | Facility: CLINIC | Age: 60
End: 2025-06-30
Payer: COMMERCIAL

## 2025-06-30 DIAGNOSIS — R26.2 DIFFICULTY WALKING: Primary | ICD-10-CM

## 2025-07-24 ENCOUNTER — APPOINTMENT (OUTPATIENT)
Dept: PHARMACY | Facility: HOSPITAL | Age: 60
End: 2025-07-24
Payer: COMMERCIAL

## 2025-07-24 DIAGNOSIS — Z79.899 POLYPHARMACY: Primary | ICD-10-CM

## 2025-07-24 NOTE — PROGRESS NOTES
Clinical Pharmacy Appointment    Patient ID: [id number] Sp Morrison is a 60 y.o. male who presents for Follow Up appointment. Reason for Referral: polypharmacy and med rec -  PCP and Referring Provide Rafael Holliday MD   Subjective     PMH:   Past Medical History:   Diagnosis Date    Acquired hypothyroidism 10/09/2015    Anxiety     Chondromalacia of right patella     Chronic prescription benzodiazepine use     Erectile dysfunction     Esophageal varices without bleeding, unspecified esophageal varices type (Multi) 05/23/2024    GERD (gastroesophageal reflux disease)     GERD without esophagitis 12/12/2016    Glaucoma     H/O esophageal varices     History of left knee replacement     Hypercalcemia     Hyperlipidemia     Hyperparathyroidism (Multi) 05/23/2024    Hypothyroidism     Insomnia     Left anterior cruciate ligament tear 02/12/2025    Liver cirrhosis (Multi)     Recurrent genital herpes 10/25/2013    Rupture of anterior cruciate ligament of left knee, initial encounter     Tear of meniscus of left knee     Vitamin D deficiency 01/07/2016      Social Hx:  Lives with sister since he fell/surgery as his home has too many steps. 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    Medication System Management  Patient's preferred pharmacy:     [id number] - [facility] [address]  [address] [facility] [address]  Phone: [phone] Fax: [phone]   _____________________________________________________________________________    Labs  Hepatic  Lab Results   Component Value Date    ALT 18 04/14/2025    AST 14 04/14/2025    ALKPHOS 81 04/14/2025    BILITOT 0.4 04/14/2025     Kidney  eGFR Cre: 91 at 4/14/2025  2:21 PM  Calculated from:  Serum Creatinine: 0.95 mg/dL at 4/14/2025  2:21 PM  Age: 60 years  Sex: Male at 4/14/2025  2:21  PM  Calculated using the CKD-EPI Creatinine Equation (2021)    Lab Results   Component Value Date    GLUCOSE 114 04/14/2025    CALCIUM 11.4 (H) 04/14/2025     04/14/2025    K 4.8 04/14/2025    CO2 26 04/14/2025     04/14/2025    BUN 15 04/14/2025    CREATININE 0.95 04/14/2025      The 10-year ASCVD risk score (Jennifer CHAN, et al., 2019) is: 13.6%    Values used to calculate the score:      Age: 60 years      Clincally relevant sex: Male      Is Non- : No      Diabetic: No      Tobacco smoker: Yes      Systolic Blood Pressure: 120 mmHg      Is BP treated: No      HDL Cholesterol: 48 mg/dL      Total Cholesterol: 205 mg/dL     ____________________________________________________________________________    Objective   No Known Allergies  Social History     Social History Narrative    Not on file      Medication Review  Current Outpatient Medications   Medication Instructions    acyclovir (Zovirax) 400 mg tablet Take 1 tablet twice daily as needed for suppression.    ALPRAZolam (XANAX) 0.5 mg, oral, Nightly    cholecalciferol (VITAMIN D3) 25 mcg, oral, Daily    cinacalcet (SENSIPAR) 30 mg, oral, Daily    dorzolamide-timoloL (Cosopt) 22.3-6.8 mg/mL ophthalmic solution 1 drop, Both Eyes, 2 times daily    folic acid (FOLVITE) 1 mg, oral, Daily    latanoprost (Xalatan) 0.005 % ophthalmic solution 1 drop, Both Eyes, Nightly    levothyroxine (SYNTHROID, LEVOXYL) 150 mcg, oral, Daily    omeprazole (PRILOSEC) 20 mg, oral, Daily    oxyCODONE (ROXICODONE) 5 mg, oral, Every 4 hours PRN    polyethylene glycol (GLYCOLAX, MIRALAX) 17 g, oral, Daily    QUEtiapine (SEROquel) 25 mg tablet TAKE 4 to 5  TABLETS BY MOUTH AT BEDTIME AS NEEDED.      Vitals  BP Readings from Last 2 Encounters:   03/26/25 120/74   03/18/25 122/80     BMI Readings from Last 1 Encounters:   03/12/25 33.35 kg/m²      Labs  A1C  Lab Results   Component Value Date    HGBA1C 5.7 (H) 02/12/2025    HGBA1C 5.4 08/15/2023    HGBA1C 5.2  "08/09/2019     BMP  Lab Results   Component Value Date    CALCIUM 11.4 (H) 04/14/2025     04/14/2025    K 4.8 04/14/2025    CO2 26 04/14/2025     04/14/2025    BUN 15 04/14/2025    CREATININE 0.95 04/14/2025    EGFR 91 04/14/2025     Lab Results   Component Value Date    ALT 18 04/14/2025    AST 14 04/14/2025    ALKPHOS 81 04/14/2025    BILITOT 0.4 04/14/2025     Lab Results   Component Value Date    TRIG 387 (H) 02/12/2025    CHOL 205 (H) 02/12/2025    LDLF 75 08/15/2023    LDLCALC 105 (H) 02/12/2025    HDL 48 02/12/2025     Urine Microalbumin  No results found for: \"MICROALBCREA\"  Weight Management  Wt Readings from Last 3 Encounters:   03/12/25 108 kg (239 lb)   03/03/25 109 kg (240 lb 4.8 oz)   02/19/25 109 kg (240 lb 4.8 oz)      ___________________________________________________________    Assessment/Plan     Patient has recovered from his fall and is working/driving for . He denied needing any medication refills or having any questions. I discussed lipids and cholesterol and encouraged to improve diet as in the near future he may have to be started on a statin. The patient did say he lost weight and is 2 belt notches smaller than 3 months ago.     No follow up needed - encouraged patient to reach out if he has any questions in the future.    Continue all meds under the continuation of care with the referring provider and clinical pharmacy team.    Carmen Beatty, PharmD   Clinical Pharmacist Specialist, Primary Care   Phone: [phone]   Fax: [phone]   Email: [email or web address]    Verbal consent to manage patient's drug therapy was obtained from the patient. They were informed they may decline to participate or withdraw from participation in pharmacy services at any time   "

## 2025-08-05 ENCOUNTER — APPOINTMENT (OUTPATIENT)
Facility: CLINIC | Age: 60
End: 2025-08-05
Payer: COMMERCIAL

## 2025-08-12 ENCOUNTER — APPOINTMENT (OUTPATIENT)
Dept: PRIMARY CARE | Facility: CLINIC | Age: 60
End: 2025-08-12
Payer: COMMERCIAL

## 2025-08-12 VITALS
TEMPERATURE: 97.3 F | HEART RATE: 72 BPM | BODY MASS INDEX: 32.89 KG/M2 | SYSTOLIC BLOOD PRESSURE: 133 MMHG | WEIGHT: 235.7 LBS | OXYGEN SATURATION: 95 % | DIASTOLIC BLOOD PRESSURE: 76 MMHG

## 2025-08-12 DIAGNOSIS — N52.9 ERECTILE DYSFUNCTION, UNSPECIFIED ERECTILE DYSFUNCTION TYPE: Primary | ICD-10-CM

## 2025-08-12 DIAGNOSIS — Z12.5 ENCOUNTER FOR SCREENING FOR MALIGNANT NEOPLASM OF PROSTATE: ICD-10-CM

## 2025-08-12 DIAGNOSIS — E03.9 HYPOTHYROIDISM, UNSPECIFIED TYPE: ICD-10-CM

## 2025-08-12 DIAGNOSIS — I85.00 ESOPHAGEAL VARICES WITHOUT BLEEDING, UNSPECIFIED ESOPHAGEAL VARICES TYPE (MULTI): ICD-10-CM

## 2025-08-12 DIAGNOSIS — G62.9 NEUROPATHY: ICD-10-CM

## 2025-08-12 DIAGNOSIS — E21.3 HYPERPARATHYROIDISM (MULTI): ICD-10-CM

## 2025-08-12 PROCEDURE — 99214 OFFICE O/P EST MOD 30 MIN: CPT | Performed by: FAMILY MEDICINE

## 2025-08-12 RX ORDER — TADALAFIL 20 MG/1
20 TABLET ORAL DAILY PRN
Qty: 12 TABLET | Refills: 3 | Status: SHIPPED | OUTPATIENT
Start: 2025-08-12 | End: 2026-08-12

## 2025-08-20 ENCOUNTER — RESULTS FOLLOW-UP (OUTPATIENT)
Dept: PRIMARY CARE | Facility: CLINIC | Age: 60
End: 2025-08-20
Payer: COMMERCIAL

## 2025-08-22 LAB
ALBUMIN SERPL-MCNC: 4.4 G/DL (ref 3.6–5.1)
ALP SERPL-CCNC: 72 U/L (ref 35–144)
ALT SERPL-CCNC: 12 U/L (ref 9–46)
ANION GAP SERPL CALCULATED.4IONS-SCNC: 8 MMOL/L (CALC) (ref 7–17)
AST SERPL-CCNC: 15 U/L (ref 10–35)
BASOPHILS # BLD AUTO: 60 CELLS/UL (ref 0–200)
BASOPHILS NFR BLD AUTO: 1.4 %
BILIRUB SERPL-MCNC: 0.3 MG/DL (ref 0.2–1.2)
BUN SERPL-MCNC: 14 MG/DL (ref 7–25)
CALCIUM SERPL-MCNC: 10.5 MG/DL (ref 8.6–10.3)
CHLORIDE SERPL-SCNC: 107 MMOL/L (ref 98–110)
CHOLEST SERPL-MCNC: 182 MG/DL
CHOLEST/HDLC SERPL: 3.7 (CALC)
CO2 SERPL-SCNC: 25 MMOL/L (ref 20–32)
CREAT SERPL-MCNC: 0.88 MG/DL (ref 0.7–1.35)
EGFRCR SERPLBLD CKD-EPI 2021: 98 ML/MIN/1.73M2
EOSINOPHIL # BLD AUTO: 82 CELLS/UL (ref 15–500)
EOSINOPHIL NFR BLD AUTO: 1.9 %
ERYTHROCYTE [DISTWIDTH] IN BLOOD BY AUTOMATED COUNT: 13.8 % (ref 11–15)
EST. AVERAGE GLUCOSE BLD GHB EST-MCNC: 111 MG/DL
EST. AVERAGE GLUCOSE BLD GHB EST-SCNC: 6.2 MMOL/L
FOLATE SERPL-MCNC: 21.9 NG/ML
GLUCOSE SERPL-MCNC: 110 MG/DL (ref 65–99)
HBA1C MFR BLD: 5.5 %
HCT VFR BLD AUTO: 42.4 % (ref 38.5–50)
HDLC SERPL-MCNC: 49 MG/DL
HGB BLD-MCNC: 13.7 G/DL (ref 13.2–17.1)
LDLC SERPL CALC-MCNC: 96 MG/DL (CALC)
LYMPHOCYTES # BLD AUTO: 1436 CELLS/UL (ref 850–3900)
LYMPHOCYTES NFR BLD AUTO: 33.4 %
MCH RBC QN AUTO: 32.5 PG (ref 27–33)
MCHC RBC AUTO-ENTMCNC: 32.3 G/DL (ref 32–36)
MCV RBC AUTO: 100.7 FL (ref 80–100)
METHYLMALONATE SERPL-SCNC: 174 NMOL/L (ref 69–390)
MONOCYTES # BLD AUTO: 400 CELLS/UL (ref 200–950)
MONOCYTES NFR BLD AUTO: 9.3 %
NEUTROPHILS # BLD AUTO: 2322 CELLS/UL (ref 1500–7800)
NEUTROPHILS NFR BLD AUTO: 54 %
NONHDLC SERPL-MCNC: 133 MG/DL (CALC)
PLATELET # BLD AUTO: 234 THOUSAND/UL (ref 140–400)
PMV BLD REES-ECKER: 9.5 FL (ref 7.5–12.5)
POTASSIUM SERPL-SCNC: 4.5 MMOL/L (ref 3.5–5.3)
PROT SERPL-MCNC: 6.5 G/DL (ref 6.1–8.1)
PSA SERPL-MCNC: 0.84 NG/ML
PTH-INTACT SERPL-MCNC: 30 PG/ML (ref 16–77)
RBC # BLD AUTO: 4.21 MILLION/UL (ref 4.2–5.8)
SODIUM SERPL-SCNC: 140 MMOL/L (ref 135–146)
T4 FREE SERPL-MCNC: 1.6 NG/DL (ref 0.8–1.8)
TRIGL SERPL-MCNC: 245 MG/DL
TSH SERPL-ACNC: 7.61 MIU/L (ref 0.4–4.5)
WBC # BLD AUTO: 4.3 THOUSAND/UL (ref 3.8–10.8)

## 2025-08-25 ENCOUNTER — HOSPITAL ENCOUNTER (OUTPATIENT)
Dept: RADIOLOGY | Facility: CLINIC | Age: 60
Discharge: HOME | End: 2025-08-25
Payer: COMMERCIAL

## 2025-08-25 ENCOUNTER — OFFICE VISIT (OUTPATIENT)
Dept: ORTHOPEDIC SURGERY | Facility: CLINIC | Age: 60
End: 2025-08-25
Payer: COMMERCIAL

## 2025-08-25 DIAGNOSIS — S82.122D CLOSED FRACTURE OF LATERAL PORTION OF LEFT TIBIAL PLATEAU WITH ROUTINE HEALING: ICD-10-CM

## 2025-08-25 PROCEDURE — 99202 OFFICE O/P NEW SF 15 MIN: CPT | Performed by: ORTHOPAEDIC SURGERY

## 2025-08-25 PROCEDURE — 99213 OFFICE O/P EST LOW 20 MIN: CPT | Performed by: ORTHOPAEDIC SURGERY

## 2025-08-25 PROCEDURE — 73560 X-RAY EXAM OF KNEE 1 OR 2: CPT | Mod: LEFT SIDE | Performed by: RADIOLOGY

## 2025-08-25 PROCEDURE — 73560 X-RAY EXAM OF KNEE 1 OR 2: CPT | Mod: LT

## 2025-08-26 ENCOUNTER — APPOINTMENT (OUTPATIENT)
Dept: BEHAVIORAL HEALTH | Facility: CLINIC | Age: 60
End: 2025-08-26
Payer: COMMERCIAL

## 2025-08-26 DIAGNOSIS — F41.9 ANXIETY: ICD-10-CM

## 2025-08-26 DIAGNOSIS — G47.00 INSOMNIA, UNSPECIFIED TYPE: ICD-10-CM

## 2025-08-29 ENCOUNTER — APPOINTMENT (OUTPATIENT)
Dept: OPHTHALMOLOGY | Facility: CLINIC | Age: 60
End: 2025-08-29
Payer: COMMERCIAL

## 2025-08-30 RX ORDER — QUETIAPINE FUMARATE 25 MG/1
TABLET, FILM COATED ORAL
Qty: 150 TABLET | Refills: 2 | Status: SHIPPED | OUTPATIENT
Start: 2025-08-30

## 2025-08-30 RX ORDER — ALPRAZOLAM 0.5 MG/1
0.5 TABLET ORAL NIGHTLY
Qty: 30 TABLET | Refills: 2 | Status: SHIPPED | OUTPATIENT
Start: 2025-08-30

## 2025-08-30 ASSESSMENT — ENCOUNTER SYMPTOMS
NERVOUS/ANXIOUS: 0
DYSPHORIC MOOD: 0

## 2025-09-02 ENCOUNTER — APPOINTMENT (OUTPATIENT)
Dept: BEHAVIORAL HEALTH | Facility: CLINIC | Age: 60
End: 2025-09-02
Payer: COMMERCIAL

## 2025-09-04 ENCOUNTER — APPOINTMENT (OUTPATIENT)
Facility: CLINIC | Age: 60
End: 2025-09-04
Payer: COMMERCIAL

## 2025-10-31 ENCOUNTER — APPOINTMENT (OUTPATIENT)
Dept: OPHTHALMOLOGY | Facility: CLINIC | Age: 60
End: 2025-10-31
Payer: COMMERCIAL

## 2025-11-18 ENCOUNTER — APPOINTMENT (OUTPATIENT)
Dept: BEHAVIORAL HEALTH | Facility: CLINIC | Age: 60
End: 2025-11-18
Payer: COMMERCIAL

## 2026-02-12 ENCOUNTER — APPOINTMENT (OUTPATIENT)
Dept: PRIMARY CARE | Facility: CLINIC | Age: 61
End: 2026-02-12
Payer: COMMERCIAL

## 2026-05-14 ENCOUNTER — APPOINTMENT (OUTPATIENT)
Dept: ENDOCRINOLOGY | Facility: CLINIC | Age: 61
End: 2026-05-14
Payer: COMMERCIAL

## 2026-10-15 ENCOUNTER — APPOINTMENT (OUTPATIENT)
Dept: ENDOCRINOLOGY | Facility: CLINIC | Age: 61
End: 2026-10-15
Payer: COMMERCIAL

## (undated) DEVICE — GLOVE, SURGICAL, PROTEXIS PI BLUE W/NEUTHERA, 7.5, PF, LF

## (undated) DEVICE — BIT, DRILL, CANNULATED, QUICK-COUPLING, 5 X 300 MM, STAINLESS STEEL

## (undated) DEVICE — Device

## (undated) DEVICE — SUTURE, VICRYL, 1, 36 IN, CT-1, UNDYED

## (undated) DEVICE — BANDAGE, COMPRESSION, W/CLIP, FLEX-MASTER, DOUBLE LENGTH, 6 IN X 11 YD, LF

## (undated) DEVICE — DRAPE COVER, C ARM, FLOUROSCAN IMAGING SYS

## (undated) DEVICE — GUIDEWIRE, THREADED, TROCAR POINT, 2.8 X 300 MM, COBALT ALLOY

## (undated) DEVICE — SUTURE, VICRYL, 2-0, 36 IN, CT-1, UNDYED

## (undated) DEVICE — WOUND SYSTEM, DEBRIDEMENT & CLEANING, O.R DUOPAK